# Patient Record
Sex: FEMALE | Race: BLACK OR AFRICAN AMERICAN | Employment: FULL TIME | URBAN - METROPOLITAN AREA
[De-identification: names, ages, dates, MRNs, and addresses within clinical notes are randomized per-mention and may not be internally consistent; named-entity substitution may affect disease eponyms.]

---

## 2017-01-13 RX ORDER — LANSOPRAZOLE 30 MG/1
CAPSULE, DELAYED RELEASE ORAL
Qty: 90 CAP | Refills: 3 | Status: SHIPPED | OUTPATIENT
Start: 2017-01-13 | End: 2018-06-15 | Stop reason: ALTCHOICE

## 2017-02-24 ENCOUNTER — OFFICE VISIT (OUTPATIENT)
Dept: INTERNAL MEDICINE CLINIC | Age: 52
End: 2017-02-24

## 2017-02-24 VITALS
DIASTOLIC BLOOD PRESSURE: 84 MMHG | HEART RATE: 80 BPM | BODY MASS INDEX: 30.99 KG/M2 | HEIGHT: 65 IN | SYSTOLIC BLOOD PRESSURE: 127 MMHG | TEMPERATURE: 97.6 F | WEIGHT: 186 LBS | OXYGEN SATURATION: 99 % | RESPIRATION RATE: 20 BRPM

## 2017-02-24 DIAGNOSIS — E66.9 OBESITY, UNSPECIFIED: ICD-10-CM

## 2017-02-24 DIAGNOSIS — E55.9 VITAMIN D DEFICIENCY: ICD-10-CM

## 2017-02-24 DIAGNOSIS — R73.9 ELEVATED BLOOD SUGAR: ICD-10-CM

## 2017-02-24 DIAGNOSIS — I10 ESSENTIAL HYPERTENSION: Primary | ICD-10-CM

## 2017-02-24 DIAGNOSIS — E78.00 PURE HYPERCHOLESTEROLEMIA: ICD-10-CM

## 2017-02-24 RX ORDER — ESTRADIOL 10 UG/1
TABLET VAGINAL
Refills: 5 | COMMUNITY
Start: 2017-02-09

## 2017-02-24 NOTE — PROGRESS NOTES
Patient is in the office today for a 3 month follow up. Do you have an Advance Directive yes - on file with Kevin Eric      1. Have you been to the ER, urgent care clinic since your last visit? Hospitalized since your last visit? No    2. Have you seen or consulted any other health care providers outside of the [address] [place] [address] since your last visit? Include any pap smears or colon screening. Yes, Dr. Xander Aden.

## 2017-02-24 NOTE — PATIENT INSTRUCTIONS

## 2017-02-24 NOTE — MR AVS SNAPSHOT
Visit Information Date & Time Provider Department Dept. Phone Encounter #  
 2/24/2017  4:00 PM Lianne Campuzano MD [facility] [phone] Follow-up Instructions Return in about 3 months (around 5/24/2017) for labs 1 week before. Your Appointments 5/2/2017  8:00 AM  
LAB with Lianne Campuzano MD  
[facility] (--) Appt Note: 6 mo f/u lab [address] [id number] [phone]  
  
   
 [address] [place] [address],[address] [place] [address]  
  
    
 5/9/2017  8:30 AM  
ROUTINE CARE with Lianne Campuzano MD  
[facility] (--) Appt Note: 6 mo f/u  
 [address] [id number] [phone]  
  
   
 [address] [place], [place] [address] Upcoming Health Maintenance Date Due Hepatitis C Screening 1965 PAP AKA CERVICAL CYTOLOGY 12/16/2014 BREAST CANCER SCRN MAMMOGRAM 2/27/2016 INFLUENZA AGE 9 TO ADULT 8/1/2016 DTaP/Tdap/Td series (2 - Td) 6/16/2017 COLONOSCOPY 5/13/2020 Allergies as of 2/24/2017  Review Complete On: 2/24/2017 By: Shelby Cai LPN Severity Noted Reaction Type Reactions Bactrim [Sulfamethoprim Ds]  12/15/2011    Other (comments) Made mouth feel thick, speech slurred Current Immunizations  Reviewed on 11/9/2015 Name Date Influenza Vaccine 10/15/2015 Influenza Vaccine Nasal 8/1/2012 Influenza Vaccine Whole 8/16/2011 TDAP Vaccine 6/16/2007 Not reviewed this visit You Were Diagnosed With   
  
 Codes Comments Essential hypertension    -  Primary ICD-10-CM: I10 
ICD-9-CM: 401.9 Vitamin D deficiency     ICD-10-CM: E55.9 ICD-9-CM: 268.9 Obesity, unspecified     ICD-10-CM: E66.9 ICD-9-CM: 278.00 Vitals BP  
  
  
  
  
  
 127/84 (BP 1 Location: Left arm, BP Patient Position: Sitting) Vitals History BMI and BSA Data Body Mass Index Body Surface Area 30.95 kg/m 2 1.97 m 2 Preferred Pharmacy Pharmacy Name Phone [id number] [address], [address] [facility] [phone] Your Updated Medication List  
  
   
This list is accurate as of: 2/24/17  4:51 PM.  Always use your most recent med list.  
  
  
  
  
 Cholecalciferol (Vitamin D3) 2,000 unit Cap capsule Commonly known as:  VITAMIN D Take 2,000 Units by mouth daily. CLINDESSE 2 % SR vaginal cream  
Generic drug:  clindamycin  
  
 cloNIDine 0.1 mg/24 hr patch Commonly known as:  CATAPRES  
APPLY 1 PATCH BY TRANSDERMAL ROUTE EVERY 7 DAYS. cpap machine kit  
by Does Not Apply route. Replace mask, headgear, heated hose, filters and accessories as needed for one year. CPAP pressure: 9 cwp Mask: standard Mirage FX plus Opus nasal pillows mask, or mask of choice Homecare Company: MED Download data 30 days after initiation of therapy  
  
 cyclobenzaprine 5 mg tablet Commonly known as:  FLEXERIL  
TAKE 1-2 TABLETS BY MOUTH AT NIGHT. diphenhydrAMINE 25 mg capsule Commonly known as:  BENADRYL Take 1 capsule by mouth nightly as needed. lansoprazole 30 mg capsule Commonly known as:  PREVACID TAKE 1 CAPSULE BY MOUTH DAILY (BEFORE BREAKFAST). lisinopril-hydroCHLOROthiazide 20-12.5 mg per tablet Commonly known as:  PRINZIDE, ZESTORETIC  
TAKE 1 TABLET BY MOUTH ONCE DAILY lorcaserin 10 mg Tab Commonly known as:  Delories [place] Take 10 mg by mouth two (2) times a day. Max Daily Amount: 20 mg.  
  
 meclizine 25 mg tablet Commonly known as:  ANTIVERT  
TAKE 1 TAB BY MOUTH THREE (3) TIMES DAILY AS NEEDED FOR UP TO 10 DAYS. MILK THISTLE PO Take 1 Cap by mouth daily. ondansetron hcl 4 mg tablet Commonly known as:  ZOFRAN (AS HYDROCHLORIDE) Take 1 Tab by mouth every eight (8) hours as needed for Nausea. * OTHER Tumeric 1 cap daily  * OTHER  
alestrian hormone cream  
  
 PROBIOTIC PO Take  by mouth. raNITIdine 300 mg tablet Commonly known as:  ZANTAC Take 300 mg by mouth daily as needed. simvastatin 20 mg tablet Commonly known as:  ZOCOR  
TAKE 1 TABLET BY MOUTH AT BEDTIME  
  
 valACYclovir 500 mg tablet Commonly known as:  VALTREX Take 500 mg by mouth daily. vitamin e 400 unit Tab Take 1 Cap by mouth daily. YUVAFEM 10 mcg Tab vaginal tablet Generic drug:  estradiol INSERT 1 APPLICATORFUL VAGINALLY AT BEDTIME FOR 14 DAYS THEN TWICE PER WEEK * Notice: This list has 2 medication(s) that are the same as other medications prescribed for you. Read the directions carefully, and ask your doctor or other care provider to review them with you. Follow-up Instructions Return in about 3 months (around 5/24/2017) for labs 1 week before. Patient Instructions Heart-Healthy Diet: Care Instructions Your Care Instructions A heart-healthy diet has lots of vegetables, fruits, nuts, beans, and whole grains, and is low in salt. It limits foods that are high in saturated fat, such as meats, cheeses, and fried foods. It may be hard to change your diet, but even small changes can lower your risk of heart attack and heart disease. Follow-up care is a key part of your treatment and safety. Be sure to make and go to all appointments, and call your doctor if you are having problems. It's also a good idea to know your test results and keep a list of the medicines you take. How can you care for yourself at home? Watch your portions · Learn what a serving is. A \"serving\" and a \"portion\" are not always the same thing. Make sure that you are not eating larger portions than are recommended. For example, a serving of pasta is ½ cup. A serving size of meat is 2 to 3 ounces. A 3-ounce serving is about the size of a deck of cards. Measure serving sizes until you are good at [place]" them.  Keep in mind that restaurants often serve portions that are 2 or 3 times the size of one serving. · To keep your energy level up and keep you from feeling hungry, eat often but in smaller portions. · Eat only the number of calories you need to stay at a healthy weight. If you need to lose weight, eat fewer calories than your body burns (through exercise and other physical activity). Eat more fruits and vegetables · Eat a variety of fruit and vegetables every day. Dark green, deep orange, red, or yellow fruits and vegetables are especially good for you. Examples include spinach, carrots, peaches, and berries. · Keep carrots, celery, and other veggies handy for snacks. Buy fruit that is in season and store it where you can see it so that you will be tempted to eat it. · Cook dishes that have a lot of veggies in them, such as stir-fries and soups. Limit saturated and trans fat · Read food labels, and try to avoid saturated and trans fats. They increase your risk of heart disease. Trans fat is found in many processed foods such as cookies and crackers. · Use olive or canola oil when you cook. Try cholesterol-lowering spreads, such as Benecol or Take Control. · Bake, broil, grill, or steam foods instead of frying them. · Choose lean meats instead of high-fat meats such as hot dogs and sausages. Cut off all visible fat when you prepare meat. · Eat fish, skinless poultry, and meat alternatives such as soy products instead of high-fat meats. Soy products, such as tofu, may be especially good for your heart. · Choose low-fat or fat-free milk and dairy products. Eat fish · Eat at least two servings of fish a week. Certain fish, such as salmon and tuna, contain omega-3 fatty acids, which may help reduce your risk of heart attack. Eat foods high in fiber · Eat a variety of grain products every day. Include whole-grain foods that have lots of fiber and nutrients.  Examples of whole-grain foods include oats, whole wheat bread, and brown rice. · Buy whole-grain breads and cereals, instead of white bread or pastries. Limit salt and sodium · Limit how much salt and sodium you eat to help lower your blood pressure. · Taste food before you salt it. Add only a little salt when you think you need it. With time, your taste buds will adjust to less salt. · Eat fewer snack items, fast foods, and other high-salt, processed foods. Check food labels for the amount of sodium in packaged foods. · Choose low-sodium versions of canned goods (such as soups, vegetables, and beans). Limit sugar · Limit drinks and foods with added sugar. These include candy, desserts, and soda pop. Limit alcohol · Limit alcohol to no more than 2 drinks a day for men and 1 drink a day for women. Too much alcohol can cause health problems. When should you call for help? Watch closely for changes in your health, and be sure to contact your doctor if: 
· You would like help planning heart-healthy meals. Where can you learn more? Go to http://libby-orlando.info/. Enter V137 in the search box to learn more about \"Heart-Healthy Diet: Care Instructions. \" Current as of: January 27, 2016 Content Version: 11.1 © 4303-8816 [employer]. Care instructions adapted under license by [employer] (which disclaims liability or warranty for this information). If you have questions about a medical condition or this instruction, always ask your healthcare professional. [place] [address] any warranty or liability for your use of this information. Introducing hospitals & HEALTH SERVICES! [employer] introduces [employer] patient portal. Now you can access parts of your medical record, email your doctor's office, and request medication refills online. 1. In your internet browser, go to [email or web address] 2. Click on the First Time User? Click Here link in the Sign In box.  You will see the New Member Sign Up page. 3. Enter your [employer] Access Code exactly as it appears below. You will not need to use this code after youve completed the sign-up process. If you do not sign up before the expiration date, you must request a new code. · [employer] Access Code: [email or web address] Expires: 5/25/2017  4:51 PM 
 
4. Enter the last four digits of your Social Security Number (xxxx) and Date of Birth (mm/dd/yyyy) as indicated and click Submit. You will be taken to the next sign-up page. 5. Create a [employer] ID. This will be your [employer] login ID and cannot be changed, so think of one that is secure and easy to remember. 6. Create a [employer] password. You can change your password at any time. 7. Enter your Password Reset Question and Answer. This can be used at a later time if you forget your password. 8. Enter your e-mail address. You will receive e-mail notification when new information is available in [address]. 9. Click Sign Up. You can now view and download portions of your medical record. 10. Click the Download Summary menu link to download a portable copy of your medical information. If you have questions, please visit the Frequently Asked Questions section of the [employer] website. Remember, [employer] is NOT to be used for urgent needs. For medical emergencies, dial 911. Now available from your iPhone and Android! Please provide this summary of care documentation to your next provider. Your primary care clinician is listed as ZAKIYA BEGUM. If you have any questions after today's visit, please call [phone].

## 2017-02-26 NOTE — PROGRESS NOTES
Subjective:       Chief Complaint  The patient presents for follow up of hypertension and high cholesterol. Vit D deficiency         HPI  Howie Lira is a 45 y.o. female seen for follow up of hyperlipidemia. Shealso has hypertension. Hypertension well controlled, no significant medication side effects noted, on catapres which is also for hot flashes and zestoretic , hyperlipidemia improved on Zocor with better compliance but would like to get LDL<70, no significant medication side effects noted, on Zocor. Pt continues to struggle with with losing weight but is doing better with current diet. Pt did not use Belviq and did not want to do high protein diet. Diet and Lifestyle: generally follows a low fat low cholesterol diet, exercises sporadically    Home BP Monitoring: is not measured at home. Other symptoms and concerns: vit D level is well controlled on vit D 2000 units/day     Prediabetes  Fairly stable with diet     Current Outpatient Prescriptions   Medication Sig    YUVAFEM 10 mcg tab vaginal tablet INSERT 1 APPLICATORFUL VAGINALLY AT BEDTIME FOR 14 DAYS THEN TWICE PER WEEK    lansoprazole (PREVACID) 30 mg capsule TAKE 1 CAPSULE BY MOUTH DAILY (BEFORE BREAKFAST).  LACTOBACILLUS ACIDOPHILUS (PROBIOTIC PO) Take  by mouth.  simvastatin (ZOCOR) 20 mg tablet TAKE 1 TABLET BY MOUTH AT BEDTIME    lisinopril-hydrochlorothiazide (PRINZIDE, ZESTORETIC) 20-12.5 mg per tablet TAKE 1 TABLET BY MOUTH ONCE DAILY    OTHER alestrian hormone cream    cloNIDine (CATAPRES) 0.1 mg/24 hr patch APPLY 1 PATCH BY TRANSDERMAL ROUTE EVERY 7 DAYS.  OTHER Tumeric 1 cap daily    ranitidine (ZANTAC) 300 mg tablet Take 300 mg by mouth daily as needed.  MILK THISTLE PO Take 1 Cap by mouth daily.  vitamin e 400 unit tab Take 1 Cap by mouth daily.  Cholecalciferol, Vitamin D3, (VITAMIN D) 2,000 unit cap capsule Take 2,000 Units by mouth daily.  cpap machine kit by Does Not Apply route.  Replace mask, headgear, heated hose, filters and accessories as needed for one year. CPAP pressure: 9 cwp  Mask: standard Mirage FX plus Opus nasal pillows mask, or mask of choice  Homecare Company: MED  Download data 30 days after initiation of therapy    cyclobenzaprine (FLEXERIL) 5 mg tablet TAKE 1-2 TABLETS BY MOUTH AT NIGHT.  meclizine (ANTIVERT) 25 mg tablet TAKE 1 TAB BY MOUTH THREE (3) TIMES DAILY AS NEEDED FOR UP TO 10 DAYS.  lorcaserin (BELVIQ) 10 mg tab Take 10 mg by mouth two (2) times a day. Max Daily Amount: 20 mg.    ondansetron hcl (ZOFRAN, AS HYDROCHLORIDE,) 4 mg tablet Take 1 Tab by mouth every eight (8) hours as needed for Nausea.  valACYclovir (VALTREX) 500 mg tablet Take 500 mg by mouth daily.  CLINDESSE 2 % SR vaginal cream     diphenhydrAMINE (BENADRYL) 25 mg capsule Take 1 capsule by mouth nightly as needed. No current facility-administered medications for this visit. Review of Systems  Respiratory: negative for dyspnea on exertion  Cardiovascular: negative for chest pain    Objective:     Visit Vitals    /84 (BP 1 Location: Left arm, BP Patient Position: Sitting)    Pulse 80    Temp 97.6 °F (36.4 °C) (Oral)    Resp 20    Ht 5' 5\" (1.651 m)    Wt 186 lb (84.4 kg)    SpO2 99%    BMI 30.95 kg/m2        General appearance - alert, well appearing, and in no distress  Chest - clear to auscultation, no wheezes, rales or rhonchi, symmetric air entry  Heart - normal rate, regular rhythm, normal S1, S2, no murmurs, rubs, clicks or gallops  Extremities - peripheral pulses normal, no pedal edema, no clubbing or cyanosis  Skin - normal coloration and turgor, no rashes, no suspicious skin lesions noted      Labs: reviewed         Assessment / Plan     Hypertension well controlled, on current meds  Hyperlipidemia fairly well controlled, on Zocor. Will try to improve it further with diet and weight loss      ICD-10-CM ICD-9-CM    1.  Essential hypertension I10 401.9 METABOLIC PANEL, COMPREHENSIVE   2. Vitamin D deficiency E55.9 268.9 Well controlled on current supplementation VITAMIN D, 25 HYDROXY   3. Pure hypercholesterolemia E78.00 272.0 Improving with diet and weight loss LIPID PANEL   4. Elevated blood sugar R73.9 790.29 Improved with weight loss  And now in normal range. HEMOGLOBIN A1C W/O EAG   5. Obesity, unspecified E66.9 278.00 Pt will continue to work on losing weight with diet and exercise. Low cholesterol diet, weight control and daily exercise discussed. Follow-up Disposition:  Return in about 3 months (around 5/24/2017) for labs 1 week before. Reviewed plan of care. Patient has provided input and agrees with goals.

## 2017-04-10 RX ORDER — CLONIDINE 0.1 MG/24H
PATCH, EXTENDED RELEASE TRANSDERMAL
Qty: 12 PATCH | Refills: 3 | Status: SHIPPED | OUTPATIENT
Start: 2017-04-10 | End: 2017-09-07 | Stop reason: SDUPTHER

## 2017-04-17 RX ORDER — SIMVASTATIN 20 MG/1
TABLET, FILM COATED ORAL
Qty: 90 TAB | Refills: 2 | Status: SHIPPED | OUTPATIENT
Start: 2017-04-17 | End: 2017-09-07 | Stop reason: SDUPTHER

## 2017-05-16 ENCOUNTER — OFFICE VISIT (OUTPATIENT)
Dept: INTERNAL MEDICINE CLINIC | Age: 52
End: 2017-05-16

## 2017-05-16 VITALS
RESPIRATION RATE: 18 BRPM | WEIGHT: 188 LBS | HEART RATE: 87 BPM | DIASTOLIC BLOOD PRESSURE: 85 MMHG | TEMPERATURE: 98.2 F | SYSTOLIC BLOOD PRESSURE: 138 MMHG | BODY MASS INDEX: 31.32 KG/M2 | HEIGHT: 65 IN | OXYGEN SATURATION: 98 %

## 2017-05-16 DIAGNOSIS — I10 ESSENTIAL HYPERTENSION: Primary | ICD-10-CM

## 2017-05-16 DIAGNOSIS — R73.9 ELEVATED BLOOD SUGAR: ICD-10-CM

## 2017-05-16 DIAGNOSIS — E78.00 PURE HYPERCHOLESTEROLEMIA: ICD-10-CM

## 2017-05-16 DIAGNOSIS — E55.9 VITAMIN D DEFICIENCY: ICD-10-CM

## 2017-05-16 RX ORDER — LISINOPRIL AND HYDROCHLOROTHIAZIDE 12.5; 2 MG/1; MG/1
TABLET ORAL
Qty: 90 TAB | Refills: 2 | Status: SHIPPED | OUTPATIENT
Start: 2017-05-16 | End: 2017-09-07 | Stop reason: SDUPTHER

## 2017-05-16 RX ORDER — LEVALBUTEROL TARTRATE 45 UG/1
AEROSOL, METERED ORAL
Refills: 0 | COMMUNITY
Start: 2017-05-04 | End: 2018-03-01 | Stop reason: SDUPTHER

## 2017-05-16 NOTE — PROGRESS NOTES
Patient is in the office today for a 6 month follow up. 1. Have you been to the ER, urgent care clinic since your last visit? Hospitalized since your last visit? Yes, [facility]. 2. Have you seen or consulted any other health care providers outside of the [facility] since your last visit? Include any pap smears or colon screening. Yes, Dr. Orly Hunt.

## 2017-05-16 NOTE — PROGRESS NOTES
Subjective:       Chief Complaint  The patient presents for follow up of hypertension and high cholesterol. Vit D deficiency         HPI  Montse Grimes is a 45 y.o. female seen for follow up of hyperlipidemia. Shealso has hypertension. Hypertension well controlled, no significant medication side effects noted, on catapres which is also for hot flashes and zestoretic , hyperlipidemia improved on Zocor with better compliance but would like to get LDL<70, it is currently 94, no significant medication side effects noted, on Zocor. Pt continues to struggle with with losing weight but is doing better with current diet. Diet and Lifestyle: generally follows a low fat low cholesterol diet, exercises sporadically    Home BP Monitoring: is not measured at home. Other symptoms and concerns: vit D level is well controlled on vit D 2000 units/day     Prediabetes  Fairly stable with diet     Current Outpatient Prescriptions   Medication Sig    levalbuterol tartrate (XOPENEX) 45 mcg/actuation inhaler INHALE 1-2 PUFFS EVERY 4 TO 6 HOURS AS NEEDED    lisinopril-hydroCHLOROthiazide (PRINZIDE, ZESTORETIC) 20-12.5 mg per tablet TAKE 1 TABLET BY MOUTH ONCE DAILY    simvastatin (ZOCOR) 20 mg tablet TAKE 1 TABLET BY MOUTH AT BEDTIME    cloNIDine (CATAPRES) 0.1 mg/24 hr patch APPLY 1 PATCH BY TRANSDERMAL ROUTE EVERY 7 DAYS.    YUVAFEM 10 mcg tab vaginal tablet INSERT 1 APPLICATORFUL VAGINALLY AT BEDTIME FOR 14 DAYS THEN TWICE PER WEEK    lansoprazole (PREVACID) 30 mg capsule TAKE 1 CAPSULE BY MOUTH DAILY (BEFORE BREAKFAST).  LACTOBACILLUS ACIDOPHILUS (PROBIOTIC PO) Take  by mouth.  OTHER alestrian hormone cream    OTHER Tumeric 1 cap daily    valACYclovir (VALTREX) 500 mg tablet Take 500 mg by mouth daily.  ranitidine (ZANTAC) 300 mg tablet Take 300 mg by mouth daily as needed.  CLINDESSE 2 % SR vaginal cream     MILK THISTLE PO Take 1 Cap by mouth daily.  vitamin e 400 unit tab Take 1 Cap by mouth daily.  Cholecalciferol, Vitamin D3, (VITAMIN D) 2,000 unit cap capsule Take 2,000 Units by mouth daily.  cpap machine kit by Does Not Apply route. Replace mask, headgear, heated hose, filters and accessories as needed for one year. CPAP pressure: 9 cwp  Mask: standard Mirage FX plus Opus nasal pillows mask, or mask of choice  Homecare Company: MED  Download data 30 days after initiation of therapy    cyclobenzaprine (FLEXERIL) 5 mg tablet TAKE 1-2 TABLETS BY MOUTH AT NIGHT.  meclizine (ANTIVERT) 25 mg tablet TAKE 1 TAB BY MOUTH THREE (3) TIMES DAILY AS NEEDED FOR UP TO 10 DAYS.  lorcaserin (BELVIQ) 10 mg tab Take 10 mg by mouth two (2) times a day. Max Daily Amount: 20 mg.    ondansetron hcl (ZOFRAN, AS HYDROCHLORIDE,) 4 mg tablet Take 1 Tab by mouth every eight (8) hours as needed for Nausea.  diphenhydrAMINE (BENADRYL) 25 mg capsule Take 1 capsule by mouth nightly as needed. No current facility-administered medications for this visit. Review of Systems  Respiratory: negative for dyspnea on exertion  Cardiovascular: negative for chest pain    Objective:     Visit Vitals    /85 (BP 1 Location: Left arm, BP Patient Position: Sitting)    Pulse 87    Temp 98.2 °F (36.8 °C) (Oral)    Resp 18    Ht 5' 5\" (1.651 m)    Wt 188 lb (85.3 kg)    SpO2 98%    BMI 31.28 kg/m2        General appearance - alert, well appearing, and in no distress  Chest - clear to auscultation, no wheezes, rales or rhonchi, symmetric air entry  Heart - normal rate, regular rhythm, normal S1, S2, no murmurs, rubs, clicks or gallops  Extremities - peripheral pulses normal, no pedal edema, no clubbing or cyanosis  Skin - normal coloration and turgor, no rashes, no suspicious skin lesions noted      Labs: reviewed         Assessment / Plan     Hypertension well controlled, on current meds  Hyperlipidemia fairly well controlled, on Zocor.  Will try to improve it further with diet and weight loss      ICD-10-CM ICD-9-CM    1. Essential hypertension W50 506.8 METABOLIC PANEL, COMPREHENSIVE   2. Pure hypercholesterolemia E78.00 272.0 LIPID PANEL   3. Vitamin D deficiency E55.9 268.9 Well controlled on vit D 2000 units/day VITAMIN D, 25 HYDROXY   4. Elevated blood sugar R73.9 790.29 Much improved with diet HEMOGLOBIN A1C W/O EAG                 Low cholesterol diet, weight control and daily exercise discussed. Follow-up Disposition:  Return in about 3 months (around 8/16/2017) for labs 1 week before. Reviewed plan of care. Patient has provided input and agrees with goals.

## 2017-09-07 ENCOUNTER — OFFICE VISIT (OUTPATIENT)
Dept: INTERNAL MEDICINE CLINIC | Age: 52
End: 2017-09-07

## 2017-09-07 VITALS
OXYGEN SATURATION: 97 % | BODY MASS INDEX: 31.99 KG/M2 | WEIGHT: 192 LBS | TEMPERATURE: 98.3 F | SYSTOLIC BLOOD PRESSURE: 124 MMHG | DIASTOLIC BLOOD PRESSURE: 75 MMHG | HEART RATE: 73 BPM | RESPIRATION RATE: 18 BRPM | HEIGHT: 65 IN

## 2017-09-07 DIAGNOSIS — E78.00 PURE HYPERCHOLESTEROLEMIA: ICD-10-CM

## 2017-09-07 DIAGNOSIS — E55.9 VITAMIN D DEFICIENCY: ICD-10-CM

## 2017-09-07 DIAGNOSIS — Z11.59 NEED FOR HEPATITIS C SCREENING TEST: ICD-10-CM

## 2017-09-07 DIAGNOSIS — I10 ESSENTIAL HYPERTENSION: Primary | ICD-10-CM

## 2017-09-07 RX ORDER — SIMVASTATIN 20 MG/1
TABLET, FILM COATED ORAL
Qty: 90 TAB | Refills: 2 | Status: SHIPPED | OUTPATIENT
Start: 2017-09-07 | End: 2018-09-19 | Stop reason: SDUPTHER

## 2017-09-07 RX ORDER — LISINOPRIL AND HYDROCHLOROTHIAZIDE 12.5; 2 MG/1; MG/1
TABLET ORAL
Qty: 90 TAB | Refills: 2 | Status: SHIPPED | OUTPATIENT
Start: 2017-09-07 | End: 2018-05-14 | Stop reason: SDUPTHER

## 2017-09-07 RX ORDER — CYCLOBENZAPRINE HCL 5 MG
TABLET ORAL
Qty: 90 TAB | Refills: 1 | Status: SHIPPED | OUTPATIENT
Start: 2017-09-07

## 2017-09-07 RX ORDER — CLONIDINE 0.1 MG/24H
PATCH, EXTENDED RELEASE TRANSDERMAL
Qty: 12 PATCH | Refills: 3 | Status: SHIPPED | OUTPATIENT
Start: 2017-09-07 | End: 2018-09-09 | Stop reason: SDUPTHER

## 2017-09-07 NOTE — PROGRESS NOTES
Subjective:       Chief Complaint  The patient presents for follow up of hypertension and high cholesterol. Vit D deficiency         HPI  Tyshawn Trinidad is a 45 y.o. female seen for follow up of hyperlipidemia. Shealso has hypertension. Hypertension well controlled, no significant medication side effects noted, on catapres which is also for hot flashes and zestoretic , hyperlipidemia improved on Zocor with better compliance. LDL is down to 79, no significant medication side effects noted, on Zocor. Pt continues to struggle with with losing weight but is doing better with current diet. Diet and Lifestyle: generally follows a low fat low cholesterol diet, exercises sporadically    Home BP Monitoring: is not measured at home. Other symptoms and concerns: vit D level is well controlled on vit D 2000 units/day     Prediabetes  Fairly stable with diet     Current Outpatient Prescriptions   Medication Sig    simvastatin (ZOCOR) 20 mg tablet TAKE 1 TABLET BY MOUTH AT BEDTIME    lisinopril-hydroCHLOROthiazide (PRINZIDE, ZESTORETIC) 20-12.5 mg per tablet TAKE 1 TABLET BY MOUTH ONCE DAILY    cloNIDine (CATAPRES) 0.1 mg/24 hr patch APPLY 1 PATCH BY TRANSDERMAL ROUTE EVERY 7 DAYS.  cyclobenzaprine (FLEXERIL) 5 mg tablet TAKE 1-2 TABLETS BY MOUTH AT NIGHT.  levalbuterol tartrate (XOPENEX) 45 mcg/actuation inhaler INHALE 1-2 PUFFS EVERY 4 TO 6 HOURS AS NEEDED    YUVAFEM 10 mcg tab vaginal tablet INSERT 1 APPLICATORFUL VAGINALLY AT BEDTIME FOR 14 DAYS THEN TWICE PER WEEK    lansoprazole (PREVACID) 30 mg capsule TAKE 1 CAPSULE BY MOUTH DAILY (BEFORE BREAKFAST).  LACTOBACILLUS ACIDOPHILUS (PROBIOTIC PO) Take  by mouth.  OTHER alestrian hormone cream    OTHER Tumeric 1 cap daily    valACYclovir (VALTREX) 500 mg tablet Take 500 mg by mouth daily.  ranitidine (ZANTAC) 300 mg tablet Take 300 mg by mouth daily as needed.  CLINDESSE 2 % SR vaginal cream     MILK THISTLE PO Take 1 Cap by mouth daily.     vitamin e 400 unit tab Take 1 Cap by mouth daily.  Cholecalciferol, Vitamin D3, (VITAMIN D) 2,000 unit cap capsule Take 2,000 Units by mouth daily.  cpap machine kit by Does Not Apply route. Replace mask, headgear, heated hose, filters and accessories as needed for one year. CPAP pressure: 9 cwp  Mask: standard Mirage FX plus Opus nasal pillows mask, or mask of choice  [employer] Company: MED  Download data 30 days after initiation of therapy    meclizine (ANTIVERT) 25 mg tablet TAKE 1 TAB BY MOUTH THREE (3) TIMES DAILY AS NEEDED FOR UP TO 10 DAYS.  diphenhydrAMINE (BENADRYL) 25 mg capsule Take 1 capsule by mouth nightly as needed. No current facility-administered medications for this visit. Review of Systems  Respiratory: negative for dyspnea on exertion  Cardiovascular: negative for chest pain    Objective:     Visit Vitals    /75 (BP 1 Location: Left arm, BP Patient Position: Sitting)    Pulse 73    Temp 98.3 °F (36.8 °C) (Oral)    Resp 18    Ht 5' 5\" (1.651 m)    Wt 192 lb (87.1 kg)    SpO2 97%    BMI 31.95 kg/m2        General appearance - alert, well appearing, and in no distress  Chest - clear to auscultation, no wheezes, rales or rhonchi, symmetric air entry  Heart - normal rate, regular rhythm, normal S1, S2, no murmurs, rubs, clicks or gallops  Extremities - peripheral pulses normal, no pedal edema, no clubbing or cyanosis  Skin - normal coloration and turgor, no rashes, no suspicious skin lesions noted      Labs: reviewed         Assessment / Plan     Hypertension well controlled, on current meds  Hyperlipidemia fairly well controlled, on Zocor. Will try to improve it further with diet and weight loss      ICD-10-CM ICD-9-CM    1. Essential hypertension M31 730.5 METABOLIC PANEL, COMPREHENSIVE   2.  Pure hypercholesterolemia E78.00 272.0 LIPID PANEL   3. Vitamin D deficiency E55.9 268.9 Well controlled on vit D 2000 units/day VITAMIN D, 25 HYDROXY Low cholesterol diet, weight control and daily exercise discussed. Follow-up Disposition:  Return in about 6 months (around 3/7/2018) for labs 1 week before. Reviewed plan of care. Patient has provided input and agrees with goals.

## 2017-09-07 NOTE — PATIENT INSTRUCTIONS

## 2017-09-07 NOTE — PROGRESS NOTES
Patient is in the office today for a follow up. 1. Have you been to the ER, urgent care clinic since your last visit? Hospitalized since your last visit? No    2. Have you seen or consulted any other health care providers outside of the [address] [place] [address] since your last visit? Include any pap smears or colon screening.  No

## 2017-09-07 NOTE — MR AVS SNAPSHOT
Visit Information Date & Time Provider Department Dept. Phone Encounter #  
 9/7/2017 11:30 AM Kenneth Humphrey MD [facility] [employer] [phone] Follow-up Instructions Return in about 6 months (around 3/7/2018) for labs 1 week before. Upcoming Health Maintenance Date Due Hepatitis C Screening 1965 PAP AKA CERVICAL CYTOLOGY 12/16/2014 BREAST CANCER SCRN MAMMOGRAM 2/27/2016 DTaP/Tdap/Td series (2 - Td) 6/16/2017 INFLUENZA AGE 9 TO ADULT 8/1/2017 COLONOSCOPY 5/13/2020 Allergies as of 9/7/2017  Review Complete On: 9/7/2017 By: Kenneth Humphrey MD  
  
 Severity Noted Reaction Type Reactions Bactrim [Sulfamethoprim Ds]  12/15/2011    Other (comments) Made mouth feel thick, speech slurred Current Immunizations  Reviewed on 11/9/2015 Name Date Influenza Vaccine 10/15/2015 Influenza Vaccine Nasal 8/1/2012 Influenza Vaccine Whole 8/16/2011 TDAP Vaccine 6/16/2007 Not reviewed this visit You Were Diagnosed With   
  
 Codes Comments Essential hypertension    -  Primary ICD-10-CM: I10 
ICD-9-CM: 401.9 Pure hypercholesterolemia     ICD-10-CM: E78.00 ICD-9-CM: 272.0 Vitamin D deficiency     ICD-10-CM: E55.9 ICD-9-CM: 268.9 Vitals BP Pulse Temp Resp Height(growth percentile) Weight(growth percentile) 124/75 (BP 1 Location: Left arm, BP Patient Position: Sitting) 73 98.3 °F (36.8 °C) (Oral) 18 5' 5\" (1.651 m) 192 lb (87.1 kg) SpO2 BMI OB Status Smoking Status 97% 31.95 kg/m2 Hysterectomy Never Smoker Vitals History BMI and BSA Data Body Mass Index Body Surface Area 31.95 kg/m 2 2 m 2 Preferred Pharmacy Pharmacy Name Phone [id number], [address] [facility] [facility] [phone] Your Updated Medication List  
  
   
This list is accurate as of: 9/7/17 12:11 PM.  Always use your most recent med list.  
  
  
  
  
 Cholecalciferol (Vitamin D3) 2,000 unit Cap capsule Commonly known as:  VITAMIN D Take 2,000 Units by mouth daily. CLINDESSE 2 % SR vaginal cream  
Generic drug:  clindamycin  
  
 cloNIDine 0.1 mg/24 hr patch Commonly known as:  CATAPRES  
APPLY 1 PATCH BY TRANSDERMAL ROUTE EVERY 7 DAYS. cpap machine kit  
by Does Not Apply route. Replace mask, headgear, heated hose, filters and accessories as needed for one year. CPAP pressure: 9 cwp Mask: standard Mirage FX plus Opus nasal pillows mask, or mask of choice Homecare Company: MED Download data 30 days after initiation of therapy  
  
 cyclobenzaprine 5 mg tablet Commonly known as:  FLEXERIL  
TAKE 1-2 TABLETS BY MOUTH AT NIGHT. diphenhydrAMINE 25 mg capsule Commonly known as:  BENADRYL Take 1 capsule by mouth nightly as needed. lansoprazole 30 mg capsule Commonly known as:  PREVACID TAKE 1 CAPSULE BY MOUTH DAILY (BEFORE BREAKFAST). levalbuterol tartrate 45 mcg/actuation inhaler Commonly known as:  XOPENEX  
INHALE 1-2 PUFFS EVERY 4 TO 6 HOURS AS NEEDED  
  
 lisinopril-hydroCHLOROthiazide 20-12.5 mg per tablet Commonly known as:  PRINZIDE, ZESTORETIC  
TAKE 1 TABLET BY MOUTH ONCE DAILY  
  
 meclizine 25 mg tablet Commonly known as:  ANTIVERT  
TAKE 1 TAB BY MOUTH THREE (3) TIMES DAILY AS NEEDED FOR UP TO 10 DAYS. MILK THISTLE PO Take 1 Cap by mouth daily. * OTHER Tumeric 1 cap daily * OTHER  
alestrian hormone cream  
  
 PROBIOTIC PO Take  by mouth. raNITIdine 300 mg tablet Commonly known as:  ZANTAC Take 300 mg by mouth daily as needed. simvastatin 20 mg tablet Commonly known as:  ZOCOR  
TAKE 1 TABLET BY MOUTH AT BEDTIME  
  
 valACYclovir 500 mg tablet Commonly known as:  VALTREX Take 500 mg by mouth daily. vitamin e 400 unit Tab Take 1 Cap by mouth daily. YUVAFEM 10 mcg Tab vaginal tablet Generic drug:  estradiol INSERT 1 APPLICATORFUL VAGINALLY AT BEDTIME FOR 14 DAYS THEN TWICE PER WEEK * Notice: This list has 2 medication(s) that are the same as other medications prescribed for you. Read the directions carefully, and ask your doctor or other care provider to review them with you. Prescriptions Sent to Pharmacy Refills  
 simvastatin (ZOCOR) 20 mg tablet 2 Sig: TAKE 1 TABLET BY MOUTH AT BEDTIME Class: Normal  
 Pharmacy: [id number] [address] Ph #: [phone]  
 lisinopril-hydroCHLOROthiazide (PRINZIDE, ZESTORETIC) 20-12.5 mg per tablet 2 Sig: TAKE 1 TABLET BY MOUTH ONCE DAILY Class: Normal  
 Pharmacy: [facility] #[id number] [address] Ph #: [phone]  
 cloNIDine (CATAPRES) 0.1 mg/24 hr patch 3 Sig: APPLY 1 PATCH BY TRANSDERMAL ROUTE EVERY 7 DAYS. Class: Normal  
 Pharmacy: [facility] #[id number] Ph #: [phone]  
 cyclobenzaprine (FLEXERIL) 5 mg tablet 1 Sig: TAKE 1-2 TABLETS BY MOUTH AT NIGHT. Class: Normal  
 Pharmacy: [facility]/pharmacy [id number] [facility] [facility] [facility] [place] [address] Ph #: [phone] Follow-up Instructions Return in about 6 months (around 3/7/2018) for labs 1 week before. Patient Instructions Heart-Healthy Diet: Care Instructions Your Care Instructions A heart-healthy diet has lots of vegetables, fruits, nuts, beans, and whole grains, and is low in salt. It limits foods that are high in saturated fat, such as meats, cheeses, and fried foods. It may be hard to change your diet, but even small changes can lower your risk of heart attack and heart disease. Follow-up care is a key part of your treatment and safety. Be sure to make and go to all appointments, and call your doctor if you are having problems. It's also a good idea to know your test results and keep a list of the medicines you take. How can you care for yourself at home? Watch your portions · Learn what a serving is. A \"serving\" and a \"portion\" are not always the same thing. Make sure that you are not eating larger portions than are recommended. For example, a serving of pasta is ½ cup. A serving size of meat is 2 to 3 ounces. A 3-ounce serving is about the size of a deck of cards. Measure serving sizes until you are good at [place]" them. Keep in mind that restaurants often serve portions that are 2 or 3 times the size of one serving. · To keep your energy level up and keep you from feeling hungry, eat often but in smaller portions. · Eat only the number of calories you need to stay at a healthy weight. If you need to lose weight, eat fewer calories than your body burns (through exercise and other physical activity). Eat more fruits and vegetables · Eat a variety of fruit and vegetables every day. Dark green, deep orange, red, or yellow fruits and vegetables are especially good for you. Examples include spinach, carrots, peaches, and berries. · Keep carrots, celery, and other veggies handy for snacks. Buy fruit that is in season and store it where you can see it so that you will be tempted to eat it. · Cook dishes that have a lot of veggies in them, such as stir-fries and soups. Limit saturated and trans fat · Read food labels, and try to avoid saturated and trans fats. They increase your risk of heart disease. Trans fat is found in many processed foods such as cookies and crackers. · Use olive or canola oil when you cook. Try cholesterol-lowering spreads, such as Benecol or Take Control. · Bake, broil, grill, or steam foods instead of frying them. · Choose lean meats instead of high-fat meats such as hot dogs and sausages. Cut off all visible fat when you prepare meat. · Eat fish, skinless poultry, and meat alternatives such as soy products instead of high-fat meats. Soy products, such as tofu, may be especially good for your heart. · Choose low-fat or fat-free milk and dairy products. Eat fish · Eat at least two servings of fish a week. Certain fish, such as salmon and tuna, contain omega-3 fatty acids, which may help reduce your risk of heart attack. Eat foods high in fiber · Eat a variety of grain products every day. Include whole-grain foods that have lots of fiber and nutrients. Examples of whole-grain foods include oats, whole wheat bread, and brown rice. · Buy whole-grain breads and cereals, instead of white bread or pastries. Limit salt and sodium · Limit how much salt and sodium you eat to help lower your blood pressure. · Taste food before you salt it. Add only a little salt when you think you need it. With time, your taste buds will adjust to less salt. · Eat fewer snack items, fast foods, and other high-salt, processed foods. Check food labels for the amount of sodium in packaged foods. · Choose low-sodium versions of canned goods (such as soups, vegetables, and beans). Limit sugar · Limit drinks and foods with added sugar. These include candy, desserts, and soda pop. Limit alcohol · Limit alcohol to no more than 2 drinks a day for men and 1 drink a day for women. Too much alcohol can cause health problems. When should you call for help? Watch closely for changes in your health, and be sure to contact your doctor if: 
· You would like help planning heart-healthy meals. Where can you learn more? Go to http://libby-orlando.info/. Enter V137 in the search box to learn more about \"Heart-Healthy Diet: Care Instructions. \" Current as of: April 3, 2017 Content Version: 11.3 © 1490-5152 [employer]. Care instructions adapted under license by [employer] (which disclaims liability or warranty for this information).  If you have questions about a medical condition or this instruction, always ask your healthcare professional. [employer] disclaims any warranty or liability for your use of this information. Introducing hospitals & HEALTH SERVICES! Josiah Garcia introduces [employer] patient portal. Now you can access parts of your medical record, email your doctor's office, and request medication refills online. 1. In your internet browser, go to [email or web address] 2. Click on the First Time User? Click Here link in the Sign In box. You will see the New Member Sign Up page. 3. Enter your [employer] Access Code exactly as it appears below. You will not need to use this code after youve completed the sign-up process. If you do not sign up before the expiration date, you must request a new code. · [employer] Access Code: [email or web address] Expires: 12/6/2017 12:06 PM 
 
4. Enter the last four digits of your Social Security Number (xxxx) and Date of Birth (mm/dd/yyyy) as indicated and click Submit. You will be taken to the next sign-up page. 5. Create a [employer] ID. This will be your [employer] login ID and cannot be changed, so think of one that is secure and easy to remember. 6. Create a [employer] password. You can change your password at any time. 7. Enter your Password Reset Question and Answer. This can be used at a later time if you forget your password. 8. Enter your e-mail address. You will receive e-mail notification when new information is available in [address] 9. Click Sign Up. You can now view and download portions of your medical record. 10. Click the Download Summary menu link to download a portable copy of your medical information. If you have questions, please visit the Frequently Asked Questions section of the [employer] website. Remember, [employer] is NOT to be used for urgent needs. For medical emergencies, dial 911. Now available from your iPhone and Android! Please provide this summary of care documentation to your next provider. Your primary care clinician is listed as ZAKIYA BEGUM. If you have any questions after today's visit, please call [phone].

## 2017-10-16 ENCOUNTER — TELEPHONE (OUTPATIENT)
Dept: FAMILY MEDICINE CLINIC | Age: 52
End: 2017-10-16

## 2017-10-16 NOTE — TELEPHONE ENCOUNTER
[place] that [place] does not have all of her blood pressure medications. Per patient she only has her lisinopril. Patient wants to know what she can do to make sure her blood pressure is controlled. . Please call patient at [phone]. Please advise.

## 2017-10-16 NOTE — TELEPHONE ENCOUNTER
Patient aware to take her lisinopril- HCT 2 x /day only if she checks her BP and it is very elevated SBP>160. Patient verbalizes understanding.

## 2017-11-29 ENCOUNTER — OFFICE VISIT (OUTPATIENT)
Dept: FAMILY MEDICINE CLINIC | Age: 52
End: 2017-11-29

## 2017-11-29 VITALS
RESPIRATION RATE: 18 BRPM | TEMPERATURE: 97.9 F | WEIGHT: 197 LBS | DIASTOLIC BLOOD PRESSURE: 76 MMHG | HEART RATE: 73 BPM | HEIGHT: 65 IN | SYSTOLIC BLOOD PRESSURE: 129 MMHG | BODY MASS INDEX: 32.82 KG/M2 | OXYGEN SATURATION: 98 %

## 2017-11-29 DIAGNOSIS — M25.512 ACUTE PAIN OF LEFT SHOULDER: Primary | ICD-10-CM

## 2017-11-29 DIAGNOSIS — M79.601 RIGHT ARM PAIN: ICD-10-CM

## 2017-11-29 RX ORDER — IBUPROFEN 800 MG/1
800 TABLET ORAL
Qty: 60 TAB | Refills: 1 | Status: SHIPPED | OUTPATIENT
Start: 2017-11-29 | End: 2018-06-15 | Stop reason: ALTCHOICE

## 2017-11-29 NOTE — PATIENT INSTRUCTIONS

## 2017-11-29 NOTE — PROGRESS NOTES
Patient is in the office today for left neck, left shoulder and right arm pain 8/10. Patient states she had her blood drawn for biometrics screening at work and stated about 1 week later she started having arm soreness. 1. Have you been to the ER, urgent care clinic since your last visit? Hospitalized since your last visit? No    2. Have you seen or consulted any other health care providers outside of the [address] [place] [address] since your last visit? Include any pap smears or colon screening.  No

## 2017-11-29 NOTE — MR AVS SNAPSHOT
Visit Information Date & Time Provider Department Dept. Phone Encounter #  
 11/29/2017  2:45 PM Ariel Connolly[address] [phone] [id number] Follow-up Instructions Return if symptoms worsen or fail to improve. Upcoming Health Maintenance Date Due Hepatitis C Screening 1965 PAP AKA CERVICAL CYTOLOGY 12/16/2014 BREAST CANCER SCRN MAMMOGRAM 2/27/2016 DTaP/Tdap/Td series (2 - Td) 6/16/2017 Influenza Age 5 to Adult 8/1/2017 COLONOSCOPY 5/13/2020 Allergies as of 11/29/2017  Review Complete On: 11/29/2017 By: Jerrica Holland LPN Severity Noted Reaction Type Reactions Bactrim [Sulfamethoprim Ds]  12/15/2011    Other (comments) Made mouth feel thick, speech slurred Current Immunizations  Reviewed on 11/9/2015 Name Date Influenza Vaccine 10/15/2015 Influenza Vaccine Nasal 8/1/2012 Influenza Vaccine Whole 8/16/2011 TDAP Vaccine 6/16/2007 Not reviewed this visit You Were Diagnosed With   
  
 Codes Comments Acute pain of left shoulder    -  Primary ICD-10-CM: M25.512 ICD-9-CM: 719.41 Right arm pain     ICD-10-CM: I05.586 ICD-9-CM: 729.5 Vitals BP Pulse Temp Resp Height(growth percentile) Weight(growth percentile) 129/76 (BP 1 Location: Left arm, BP Patient Position: Sitting) 73 97.9 °F (36.6 °C) (Oral) 18 5' 5\" (1.651 m) 197 lb (89.4 kg) SpO2 BMI OB Status Smoking Status 98% 32.78 kg/m2 Hysterectomy Never Smoker BMI and BSA Data Body Mass Index Body Surface Area 32.78 kg/m 2 2.02 m 2 Preferred Pharmacy Pharmacy Name Phone [facility]/PHARMACY #[id number] [address], [address] [facility] [place] [facility] [phone] Your Updated Medication List  
  
   
This list is accurate as of: 11/29/17  3:27 PM.  Always use your most recent med list.  
  
  
  
  
 Cholecalciferol (Vitamin D3) 2,000 unit Cap capsule Commonly known as:  VITAMIN D  
 Take 2,000 Units by mouth daily. CLINDESSE 2 % SR vaginal cream  
Generic drug:  clindamycin  
  
 cloNIDine 0.1 mg/24 hr patch Commonly known as:  CATAPRES  
APPLY 1 PATCH BY TRANSDERMAL ROUTE EVERY 7 DAYS. cpap machine kit  
by Does Not Apply route. Replace mask, headgear, heated hose, filters and accessories as needed for one year. CPAP pressure: 9 cwp Mask: standard Mirage FX plus Opus nasal pillows mask, or mask of choice Homecare Company: MED Download data 30 days after initiation of therapy  
  
 cyclobenzaprine 5 mg tablet Commonly known as:  FLEXERIL  
TAKE 1-2 TABLETS BY MOUTH AT NIGHT. diphenhydrAMINE 25 mg capsule Commonly known as:  BENADRYL Take 1 capsule by mouth nightly as needed. ibuprofen 800 mg tablet Commonly known as:  MOTRIN Take 1 Tab by mouth every eight (8) hours as needed for Pain.  
  
 lansoprazole 30 mg capsule Commonly known as:  PREVACID TAKE 1 CAPSULE BY MOUTH DAILY (BEFORE BREAKFAST). levalbuterol tartrate 45 mcg/actuation inhaler Commonly known as:  XOPENEX  
INHALE 1-2 PUFFS EVERY 4 TO 6 HOURS AS NEEDED  
  
 lisinopril-hydroCHLOROthiazide 20-12.5 mg per tablet Commonly known as:  PRINZIDE, ZESTORETIC  
TAKE 1 TABLET BY MOUTH ONCE DAILY  
  
 meclizine 25 mg tablet Commonly known as:  ANTIVERT  
TAKE 1 TAB BY MOUTH THREE (3) TIMES DAILY AS NEEDED FOR UP TO 10 DAYS. MILK THISTLE PO Take 1 Cap by mouth daily. * OTHER Tumeric 1 cap daily * OTHER  
alestrian hormone cream  
  
 PROBIOTIC PO Take  by mouth. raNITIdine 300 mg tablet Commonly known as:  ZANTAC Take 300 mg by mouth daily as needed. simvastatin 20 mg tablet Commonly known as:  ZOCOR  
TAKE 1 TABLET BY MOUTH AT BEDTIME  
  
 valACYclovir 500 mg tablet Commonly known as:  VALTREX Take 500 mg by mouth daily. vitamin e 400 unit Tab Take 1 Cap by mouth daily. YUVAFEM 10 mcg Tab vaginal tablet Generic drug:  estradiol INSERT 1 APPLICATORFUL VAGINALLY AT BEDTIME FOR 14 DAYS THEN TWICE PER WEEK * Notice: This list has 2 medication(s) that are the same as other medications prescribed for you. Read the directions carefully, and ask your doctor or other care provider to review them with you. Prescriptions Sent to Pharmacy Refills  
 ibuprofen (MOTRIN) 800 mg tablet 1 Sig: Take 1 Tab by mouth every eight (8) hours as needed for Pain. Class: Normal  
 [facility] [address] [place] [address], [address] [place] [address],[address] #: [phone] Route: Oral  
  
Follow-up Instructions Return if symptoms worsen or fail to improve. Patient Instructions Neck: Exercises Your Care Instructions Here are some examples of typical rehabilitation exercises for your condition. Start each exercise slowly. Ease off the exercise if you start to have pain. Your doctor or physical therapist will tell you when you can start these exercises and which ones will work best for you. How to do the exercises Neck stretch 1. This stretch works best if you keep your shoulder down as you lean away from it. To help you remember to do this, start by relaxing your shoulders and lightly holding on to your thighs or your chair. 2. Tilt your head toward your shoulder and hold for 15 to 30 seconds. Let the weight of your head stretch your muscles. 3. If you would like a little added stretch, use your hand to gently and steadily pull your head toward your shoulder. For example, keeping your right shoulder down, lean your head to the left. 4. Repeat 2 to 4 times toward each shoulder. Diagonal neck stretch 1. Turn your head slightly toward the direction you will be stretching, and tilt your head diagonally toward your chest and hold for 15 to 30 seconds.  
2. If you would like a little added stretch, use your hand to gently and steadily pull your head forward on the diagonal. 
 3. Repeat 2 to 4 times toward each side. Dorsal glide stretch The dorsal glide stretches the back of the neck. If you feel pain, do not glide so far back. Some people find this exercise easier to do while lying on their backs with an ice pack on the neck. 1. Sit or stand tall and look straight ahead. 2. Slowly tuck your chin as you glide your head backward over your body 3. Hold for a count of 6, and then relax for up to 10 seconds. 4. Repeat 8 to 12 times. Chest and shoulder stretch 1. Sit or stand tall and glide your head backward as in the dorsal glide stretch. 2. Raise both arms so that your hands are next to your ears. 3. Take a deep breath, and as you breathe out, lower your elbows down and behind your back. You will feel your shoulder blades slide down and together, and at the same time you will feel a stretch across your chest and the front of your shoulders. 4. Hold for about 6 seconds, and then relax for up to 10 seconds. 5. Repeat 8 to 12 times. Strengthening: Hands on head 1. Move your head backward, forward, and side to side against gentle pressure from your hands, holding each position for about 6 seconds. 2. Repeat 8 to 12 times. Follow-up care is a key part of your treatment and safety. Be sure to make and go to all appointments, and call your doctor if you are having problems. It's also a good idea to know your test results and keep a list of the medicines you take. Where can you learn more? Go to http://libby-orlando.info/. Enter P975 in the search box to learn more about \"Neck: Exercises. \" Current as of: March 21, 2017 Content Version: 11.4 © 8044-7830 Healthwise, [employer]. Care instructions adapted under license by [employer] (which disclaims liability or warranty for this information).  If you have questions about a medical condition or this instruction, always ask your healthcare professional. Nika Khoury[employer] disclaims any warranty or liability for your use of this information. Introducing [facility] & [facility]! Gm Ortez introduces Aiming patient portal. Now you can access parts of your medical record, email your doctor's office, and request medication refills online. 1. In your internet browser, go to [email or web address] 2. Click on the First Time User? Click Here link in the Sign In box. You will see the New Member Sign Up page. 3. Enter your Aiming Access Code exactly as it appears below. You will not need to use this code after youve completed the sign-up process. If you do not sign up before the expiration date, you must request a new code. · Aiming Access Code: [email or web address] Expires: 12/6/2017 11:06 AM 
 
4. Enter the last four digits of your Social Security Number (xxxx) and Date of Birth (mm/dd/yyyy) as indicated and click Submit. You will be taken to the next sign-up page. 5. Create a Aiming ID. This will be your Aiming login ID and cannot be changed, so think of one that is secure and easy to remember. 6. Create a Aiming password. You can change your password at any time. 7. Enter your Password Reset Question and Answer. This can be used at a later time if you forget your password. 8. Enter your e-mail address. You will receive e-mail notification when new information is available in [address] 9. Click Sign Up. You can now view and download portions of your medical record. 10. Click the Download Summary menu link to download a portable copy of your medical information. If you have questions, please visit the Frequently Asked Questions section of the Aiming website. Remember, Aiming is NOT to be used for urgent needs. For medical emergencies, dial 911. Now available from your iPhone and Android! Please provide this summary of care documentation to your next provider. Your primary care clinician is listed as ZAKIYA BEGUM. If you have any questions after today's visit, please call [phone].

## 2017-11-29 NOTE — PROGRESS NOTES
Celestina Banegas is a 45 y.o.  female and presents with Neck Pain (left side); Shoulder Pain (left shoulder ); and Arm Pain (right arm)      SUBJECTIVE:    Pt about 2 weeks ago had blood draw from her right anterior cubital fossa. About 1 week later she began to have right arm pain worst with movement. She has been using heat compresses with minimal improvement. Pt also has had exacerbation of her left shoulder and neck pain. She has done PT in the past but now wants to try a chiropractor. Respiratory ROS: negative for - shortness of breath  Cardiovascular ROS: negative for - chest pain    Current Outpatient Prescriptions   Medication Sig    ibuprofen (MOTRIN) 800 mg tablet Take 1 Tab by mouth every eight (8) hours as needed for Pain.  simvastatin (ZOCOR) 20 mg tablet TAKE 1 TABLET BY MOUTH AT BEDTIME    lisinopril-hydroCHLOROthiazide (PRINZIDE, ZESTORETIC) 20-12.5 mg per tablet TAKE 1 TABLET BY MOUTH ONCE DAILY    cloNIDine (CATAPRES) 0.1 mg/24 hr patch APPLY 1 PATCH BY TRANSDERMAL ROUTE EVERY 7 DAYS.  levalbuterol tartrate (XOPENEX) 45 mcg/actuation inhaler INHALE 1-2 PUFFS EVERY 4 TO 6 HOURS AS NEEDED    YUVAFEM 10 mcg tab vaginal tablet INSERT 1 APPLICATORFUL VAGINALLY AT BEDTIME FOR 14 DAYS THEN TWICE PER WEEK    lansoprazole (PREVACID) 30 mg capsule TAKE 1 CAPSULE BY MOUTH DAILY (BEFORE BREAKFAST).  LACTOBACILLUS ACIDOPHILUS (PROBIOTIC PO) Take  by mouth.  OTHER alestrian hormone cream    OTHER Tumeric 1 cap daily    ranitidine (ZANTAC) 300 mg tablet Take 300 mg by mouth daily as needed.  CLINDESSE 2 % SR vaginal cream     MILK THISTLE PO Take 1 Cap by mouth daily.  vitamin e 400 unit tab Take 1 Cap by mouth daily.  Cholecalciferol, Vitamin D3, (VITAMIN D) 2,000 unit cap capsule Take 2,000 Units by mouth daily.  cpap machine kit by Does Not Apply route. Replace mask, headgear, heated hose, filters and accessories as needed for one year.    CPAP pressure: 9 cwp  Mask: standard Mirage FX plus Opus nasal pillows mask, or mask of choice  Homecare Company: MED  Download data 30 days after initiation of therapy    cyclobenzaprine (FLEXERIL) 5 mg tablet TAKE 1-2 TABLETS BY MOUTH AT NIGHT.  meclizine (ANTIVERT) 25 mg tablet TAKE 1 TAB BY MOUTH THREE (3) TIMES DAILY AS NEEDED FOR UP TO 10 DAYS.  valACYclovir (VALTREX) 500 mg tablet Take 500 mg by mouth daily.  diphenhydrAMINE (BENADRYL) 25 mg capsule Take 1 capsule by mouth nightly as needed. No current facility-administered medications for this visit. OBJECTIVE:  alert, well appearing, and in no distress  Visit Vitals    /76 (BP 1 Location: Left arm, BP Patient Position: Sitting)    Pulse 73    Temp 97.9 °F (36.6 °C) (Oral)    Resp 18    Ht 5' 5\" (1.651 m)    Wt 197 lb (89.4 kg)    SpO2 98%    BMI 32.78 kg/m2      well developed and well nourished  Musculoskeletal - right forearm tenderness to palpation and with movement. Assessment/Plan      ICD-10-CM ICD-9-CM    1. Acute pain of left shoulder M25.512 719.41 Pt to f/u with chiropractor    2. Right arm pain M79.601 729.5 Will treat with ibuprofen (MOTRIN) 800 mg tablet TID for possible strain. If not improving will refer to orthopedics      Follow-up Disposition:  Return if symptoms worsen or fail to improve. Reviewed plan of care. Patient has provided input and agrees with goals.

## 2017-12-13 DIAGNOSIS — E78.00 PURE HYPERCHOLESTEROLEMIA: ICD-10-CM

## 2017-12-26 ENCOUNTER — HOSPITAL ENCOUNTER (OUTPATIENT)
Dept: ULTRASOUND IMAGING | Age: 52
Discharge: HOME | End: 2017-12-26
Attending: INTERNAL MEDICINE
Payer: OTHER GOVERNMENT

## 2017-12-26 DIAGNOSIS — R14.0 ABDOMINAL DISTENSION, GASEOUS: ICD-10-CM

## 2017-12-26 PROCEDURE — 76700 US EXAM ABDOM COMPLETE: CPT

## 2018-02-19 ENCOUNTER — TELEPHONE (OUTPATIENT)
Dept: SLEEP MEDICINE | Age: 53
End: 2018-02-19

## 2018-03-01 ENCOUNTER — OFFICE VISIT (OUTPATIENT)
Dept: FAMILY MEDICINE CLINIC | Age: 53
End: 2018-03-01

## 2018-03-01 VITALS
DIASTOLIC BLOOD PRESSURE: 90 MMHG | HEIGHT: 65 IN | HEART RATE: 73 BPM | BODY MASS INDEX: 31.99 KG/M2 | WEIGHT: 192 LBS | OXYGEN SATURATION: 98 % | SYSTOLIC BLOOD PRESSURE: 124 MMHG | RESPIRATION RATE: 18 BRPM | TEMPERATURE: 98.1 F

## 2018-03-01 DIAGNOSIS — J20.9 ACUTE BRONCHITIS, UNSPECIFIED ORGANISM: Primary | ICD-10-CM

## 2018-03-01 RX ORDER — AZITHROMYCIN 250 MG/1
TABLET, FILM COATED ORAL
Qty: 6 TAB | Refills: 0 | Status: SHIPPED | OUTPATIENT
Start: 2018-03-01 | End: 2018-04-27 | Stop reason: ALTCHOICE

## 2018-03-01 RX ORDER — LEVALBUTEROL TARTRATE 45 UG/1
AEROSOL, METERED ORAL
Qty: 1 INHALER | Refills: 0 | Status: SHIPPED | OUTPATIENT
Start: 2018-03-01 | End: 2020-03-03 | Stop reason: SDUPTHER

## 2018-03-01 NOTE — MR AVS SNAPSHOT
[facility] [address] 
[phone] Patient: Irais Aguayo MRN: [id number] :1965 Visit Information Date & Time Provider Department Dept. Phone Encounter #  
 3/1/2018  8:45 AM Demond Marcelino, [address] [place] [address] [id number] Follow-up Instructions Return if symptoms worsen or fail to improve, Dr. Mele Marcial. Upcoming Health Maintenance Date Due Hepatitis C Screening 1965 PAP AKA CERVICAL CYTOLOGY 2014 BREAST CANCER SCRN MAMMOGRAM 2016 DTaP/Tdap/Td series (2 - Td) 2017 Influenza Age 5 to Adult 2017 COLONOSCOPY 2020 Allergies as of 3/1/2018  Review Complete On: 3/1/2018 By: Demond Marcelino MD  
  
 Severity Noted Reaction Type Reactions Bactrim [Sulfamethoprim Ds]  12/15/2011    Other (comments) Made mouth feel thick, speech slurred Current Immunizations  Reviewed on 2015 Name Date Influenza Vaccine 10/15/2015 Influenza Vaccine Nasal 2012 Influenza Vaccine Whole 2011 TDAP Vaccine 2007 Not reviewed this visit You Were Diagnosed With   
  
 Codes Comments Acute bronchitis, unspecified organism    -  Primary ICD-10-CM: J20.9 ICD-9-CM: 466.0 Vitals BP Pulse Temp Resp Height(growth percentile) Weight(growth percentile) 124/90 (BP 1 Location: Left arm, BP Patient Position: Sitting) 73 98.1 °F (36.7 °C) (Oral) 18 5' 5\" (1.651 m) 192 lb (87.1 kg) SpO2 BMI OB Status Smoking Status 98% 31.95 kg/m2 Hysterectomy Never Smoker BMI and BSA Data Body Mass Index Body Surface Area 31.95 kg/m 2 2 m 2 Preferred Pharmacy Pharmacy Name Phone [id number] Ravin Evans, [address] [facility] [place] [facility] [phone] Your Updated Medication List  
  
   
This list is accurate as of 3/1/18  9:07 AM.  Always use your most recent med list.  
  
  
  
  
 azithromycin 250 mg tablet Commonly known as:  Kyler Garcia Take two tablets today then one tablet daily Cholecalciferol (Vitamin D3) 2,000 unit Cap capsule Commonly known as:  VITAMIN D Take 2,000 Units by mouth daily. CLINDESSE 2 % SR vaginal cream  
Generic drug:  clindamycin  
  
 cloNIDine 0.1 mg/24 hr patch Commonly known as:  CATAPRES  
APPLY 1 PATCH BY TRANSDERMAL ROUTE EVERY 7 DAYS. cpap machine kit  
by Does Not Apply route. Replace mask, headgear, heated hose, filters and accessories as needed for one year. CPAP pressure: 9 cwp Mask: standard Mirage FX plus Opus nasal pillows mask, or mask of choice Homecare Company: MED Download data 30 days after initiation of therapy  
  
 cyclobenzaprine 5 mg tablet Commonly known as:  FLEXERIL  
TAKE 1-2 TABLETS BY MOUTH AT NIGHT. diphenhydrAMINE 25 mg capsule Commonly known as:  BENADRYL Take 1 capsule by mouth nightly as needed. ibuprofen 800 mg tablet Commonly known as:  MOTRIN Take 1 Tab by mouth every eight (8) hours as needed for Pain.  
  
 lansoprazole 30 mg capsule Commonly known as:  PREVACID TAKE 1 CAPSULE BY MOUTH DAILY (BEFORE BREAKFAST). levalbuterol tartrate 45 mcg/actuation inhaler Commonly known as:  XOPENEX  
INHALE 1-2 PUFFS EVERY 4 TO 6 HOURS AS NEEDED  
  
 lisinopril-hydroCHLOROthiazide 20-12.5 mg per tablet Commonly known as:  PRINZIDE, ZESTORETIC  
TAKE 1 TABLET BY MOUTH ONCE DAILY  
  
 meclizine 25 mg tablet Commonly known as:  ANTIVERT  
TAKE 1 TAB BY MOUTH THREE (3) TIMES DAILY AS NEEDED FOR UP TO 10 DAYS. MILK THISTLE PO Take 1 Cap by mouth daily. OTHER Tumeric 1 cap daily OTHER  
alestrian hormone cream  
  
 PROBIOTIC PO Take  by mouth. raNITIdine 300 mg tablet Commonly known as:  ZANTAC Take 300 mg by mouth daily as needed. simvastatin 20 mg tablet Commonly known as:  ZOCOR  
 TAKE 1 TABLET BY MOUTH AT BEDTIME  
  
 valACYclovir 500 mg tablet Commonly known as:  VALTREX Take 500 mg by mouth daily. vitamin e 400 unit Tab Take 1 Cap by mouth daily. YUVAFEM 10 mcg Tab vaginal tablet Generic drug:  estradiol INSERT 1 APPLICATORFUL VAGINALLY AT BEDTIME FOR 14 DAYS THEN TWICE PER WEEK Prescriptions Sent to Pharmacy Refills  
 levalbuterol tartrate (XOPENEX) 45 mcg/actuation inhaler 0 Sig: INHALE 1-2 PUFFS EVERY 4 TO 6 HOURS AS NEEDED Class: Normal  
 Pharmacy: [facility]/pharmacy #[id number] [place] [facility] [address] Ph #: [phone]  
 azithromycin (ZITHROMAX) 250 mg tablet 0 Sig: Take two tablets today then one tablet daily Class: Normal  
 Pharmacy: [facility]/pharmacy [address] [place] [address],[address] [place] [address] Ph #: [phone] Follow-up Instructions Return if symptoms worsen or fail to improve, Dr. Dhara Ellis. To-Do List   
 03/05/2018 8:30 AM  
(Arrive by 8:00 AM) Appointment with Darien Pagan PT at [facility]  ([phone]) For Lymphedema patients, please wear loose fitting clothes. Please arrive 30 minutes prior to appointment to register 03/07/2018 8:30 AM  
(Arrive by 8:00 AM) Appointment with Darien Pagan PT at [facility]  ([phone]) For Lymphedema patients, please wear loose fitting clothes. Please arrive 30 minutes prior to appointment to register 03/12/2018 9:30 AM  
(Arrive by 9:00 AM) Appointment with Darien Pagan PT at [facility]  ([phone]) For Lymphedema patients, please wear loose fitting clothes. Please arrive 30 minutes prior to appointment to register 03/14/2018 9:30 AM  
(Arrive by 9:00 AM) Appointment [place]agan [place] [address] ([phone]) For Lymphedema patients, please wear loose fitting clothes. Please arrive 30 minutes prior to appointment to register Patient Instructions Bronchitis: Care Instructions Your Care Instructions Bronchitis is inflammation of the bronchial tubes, which carry air to the lungs. The tubes swell and produce mucus, or phlegm. The mucus and inflamed bronchial tubes make you cough. You may have trouble breathing. Most cases of bronchitis are caused by viruses like those that cause colds. Antibiotics usually do not help and they may be harmful. Bronchitis usually develops rapidly and lasts about 2 to 3 weeks in otherwise healthy people. Follow-up care is a key part of your treatment and safety. Be sure to make and go to all appointments, and call your doctor if you are having problems. It's also a good idea to know your test results and keep a list of the medicines you take. How can you care for yourself at home? · Take all medicines exactly as prescribed. Call your doctor if you think you are having a problem with your medicine. · Get some extra rest. 
· Take an over-the-counter pain medicine, such as acetaminophen (Tylenol), ibuprofen (Advil, Motrin), or naproxen (Aleve) to reduce fever and relieve body aches. Read and follow all instructions on the label. · Do not take two or more pain medicines at the same time unless the doctor told you to. Many pain medicines have acetaminophen, which is Tylenol. Too much acetaminophen (Tylenol) can be harmful. · Take an over-the-counter cough medicine that contains dextromethorphan to help quiet a dry, hacking cough so that you can sleep. Avoid cough medicines that have more than one active ingredient. Read and follow all instructions on the label. · Breathe moist air from a humidifier, hot shower, or sink filled with hot water. The heat and moisture will thin mucus so you can cough it out. · Do not smoke. Smoking can make bronchitis worse. If you need help quitting, talk to your doctor about stop-smoking programs and medicines. These can increase your chances of quitting for good. When should you call for help? Call 911 anytime you think you may need emergency care. For example, call if: 
? · You have severe trouble breathing. ?Call your doctor now or seek immediate medical care if: 
? · You have new or worse trouble breathing. ? · You cough up dark brown or bloody mucus (sputum). ? · You have a new or higher fever. ? · You have a new rash. ? Watch closely for changes in your health, and be sure to contact your doctor if: 
? · You cough more deeply or more often, especially if you notice more mucus or a change in the color of your mucus. ? · You are not getting better as expected. Where can you learn more? Go to http://libby-orlando.info/. Enter H333 in the search box to learn more about \"Bronchitis: Care Instructions. \" Current as of: May 12, 2017 Content Version: 11.4 © 0440-0978 [employer]. Care instructions adapted under license by [employer] (which disclaims liability or warranty for this information). If you have questions about a medical condition or this instruction, always ask your healthcare professional. [place] [address] any warranty or liability for your use of this information. Introducing [facility] & HEALTH SERVICES! Kristina Borrego introduces Loginza patient portal. Now you can access parts of your medical record, email your doctor's office, and request medication refills online. 1. In your internet browser, go to [email or web address] 2. Click on the First Time User? Click Here link in the Sign In box. You will see the New Member Sign Up page. 3. Enter your Loginza Access Code exactly as it appears below. You will not need to use this code after youve completed the sign-up process. If you do not sign up before the expiration date, you must request a new code.  
 
· Loginza Access Code: [email or web address] 
 Expires: 3/14/2018 12:11 PM 
 
4. Enter the last four digits of your Social Security Number (xxxx) and Date of Birth (mm/dd/yyyy) as indicated and click Submit. You will be taken to the next sign-up page. 5. Create a Plink Search ID. This will be your Plink Search login ID and cannot be changed, so think of one that is secure and easy to remember. 6. Create a Plink Search password. You can change your password at any time. 7. Enter your Password Reset Question and Answer. This can be used at a later time if you forget your password. 8. Enter your e-mail address. You will receive e-mail notification when new information is available in [address] 9. Click Sign Up. You can now view and download portions of your medical record. 10. Click the Download Summary menu link to download a portable copy of your medical information. If you have questions, please visit the Frequently Asked Questions section of the Plink Search website. Remember, Plink Search is NOT to be used for urgent needs. For medical emergencies, dial 911. Now available from your iPhone and Android! Please provide this summary of care documentation to your next provider. Your primary care clinician is listed as ZAKIYA BEGUM. If you have any questions after today's visit, please call [phone].

## 2018-03-01 NOTE — PATIENT INSTRUCTIONS
Bronchitis: Care Instructions  Your Care Instructions    Bronchitis is inflammation of the bronchial tubes, which carry air to the lungs. The tubes swell and produce mucus, or phlegm. The mucus and inflamed bronchial tubes make you cough. You may have trouble breathing. Most cases of bronchitis are caused by viruses like those that cause colds. Antibiotics usually do not help and they may be harmful. Bronchitis usually develops rapidly and lasts about 2 to 3 weeks in otherwise healthy people. Follow-up care is a key part of your treatment and safety. Be sure to make and go to all appointments, and call your doctor if you are having problems. It's also a good idea to know your test results and keep a list of the medicines you take. How can you care for yourself at home? · Take all medicines exactly as prescribed. Call your doctor if you think you are having a problem with your medicine. · Get some extra rest.  · Take an over-the-counter pain medicine, such as acetaminophen (Tylenol), ibuprofen (Advil, Motrin), or naproxen (Aleve) to reduce fever and relieve body aches. Read and follow all instructions on the label. · Do not take two or more pain medicines at the same time unless the doctor told you to. Many pain medicines have acetaminophen, which is Tylenol. Too much acetaminophen (Tylenol) can be harmful. · Take an over-the-counter cough medicine that contains dextromethorphan to help quiet a dry, hacking cough so that you can sleep. Avoid cough medicines that have more than one active ingredient. Read and follow all instructions on the label. · Breathe moist air from a humidifier, hot shower, or sink filled with hot water. The heat and moisture will thin mucus so you can cough it out. · Do not smoke. Smoking can make bronchitis worse. If you need help quitting, talk to your doctor about stop-smoking programs and medicines. These can increase your chances of quitting for good.   When should you call for help? Call 911 anytime you think you may need emergency care. For example, call if:  ? · You have severe trouble breathing. ?Call your doctor now or seek immediate medical care if:  ? · You have new or worse trouble breathing. ? · You cough up dark brown or bloody mucus (sputum). ? · You have a new or higher fever. ? · You have a new rash. ? Watch closely for changes in your health, and be sure to contact your doctor if:  ? · You cough more deeply or more often, especially if you notice more mucus or a change in the color of your mucus. ? · You are not getting better as expected. Where can you learn more? Go to http://libby-orlando.info/. Enter H333 in the search box to learn more about \"Bronchitis: Care Instructions. \"  Current as of: May 12, 2017  Content Version: 11.4  © 0218-7592 [employer]. Care instructions adapted under license by [employer] (which disclaims liability or warranty for this information). If you have questions about a medical condition or this instruction, always ask your healthcare professional. [address] any warranty or liability for your use of this information.

## 2018-03-01 NOTE — PROGRESS NOTES
Jyoti Brooks, 47 y.o.,  female    SUBJECTIVE  Cough x 5 days (Dr. Joane Fabry pt)    Pt c/o nasal congestion, cough, wheezing, myalgia. 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 She has been on xopenex in the past for similar bronchitis event, prefers this with palpitations on albuterol. She is a non smoker. ROS:  See HPI, all others negative        Patient Active Problem List   Diagnosis Code    Plantar fasciitis M72.2    HTN (hypertension) I10    Hyperlipidemia E78.5    SUBHASH on CPAP G47.33, Z99.89    GERD (gastroesophageal reflux disease) K21.9    Undiagnosed cardiac murmurs R01.1    Obesity, unspecified E66.9    Cardiomegaly I51.7    Vitamin D deficiency E55.9       Current Outpatient Prescriptions   Medication Sig Dispense Refill    levalbuterol tartrate (XOPENEX) 45 mcg/actuation inhaler INHALE 1-2 PUFFS EVERY 4 TO 6 HOURS AS NEEDED 1 Inhaler 0    azithromycin (ZITHROMAX) 250 mg tablet Take two tablets today then one tablet daily 6 Tab 0    ibuprofen (MOTRIN) 800 mg tablet Take 1 Tab by mouth every eight (8) hours as needed for Pain. 60 Tab 1    simvastatin (ZOCOR) 20 mg tablet TAKE 1 TABLET BY MOUTH AT BEDTIME 90 Tab 2    lisinopril-hydroCHLOROthiazide (PRINZIDE, ZESTORETIC) 20-12.5 mg per tablet TAKE 1 TABLET BY MOUTH ONCE DAILY 90 Tab 2    cloNIDine (CATAPRES) 0.1 mg/24 hr patch APPLY 1 PATCH BY TRANSDERMAL ROUTE EVERY 7 DAYS. 12 Patch 3    cyclobenzaprine (FLEXERIL) 5 mg tablet TAKE 1-2 TABLETS BY MOUTH AT NIGHT. 90 Tab 1    YUVAFEM 10 mcg tab vaginal tablet INSERT 1 APPLICATORFUL VAGINALLY AT BEDTIME FOR 14 DAYS THEN TWICE PER WEEK  5    meclizine (ANTIVERT) 25 mg tablet TAKE 1 TAB BY MOUTH THREE (3) TIMES DAILY AS NEEDED FOR UP TO 10 DAYS.  0    LACTOBACILLUS ACIDOPHILUS (PROBIOTIC PO) Take  by mouth.  OTHER alestrian hormone cream      OTHER Tumeric 1 cap daily      valACYclovir (VALTREX) 500 mg tablet Take 500 mg by mouth daily.   3    CLINDESSE 2 % SR vaginal cream       MILK THISTLE PO Take 1 Cap by mouth daily.  vitamin e 400 unit tab Take 1 Cap by mouth daily.  Cholecalciferol, Vitamin D3, (VITAMIN D) 2,000 unit cap capsule Take 2,000 Units by mouth daily. 90 capsule 2    diphenhydrAMINE (BENADRYL) 25 mg capsule Take 1 capsule by mouth nightly as needed. 90 capsule 2    lansoprazole (PREVACID) 30 mg capsule TAKE 1 CAPSULE BY MOUTH DAILY (BEFORE BREAKFAST). 90 Cap 3    ranitidine (ZANTAC) 300 mg tablet Take 300 mg by mouth daily as needed. 2    cpap machine kit by Does Not Apply route. Replace mask, headgear, heated hose, filters and accessories as needed for one year. CPAP pressure: 9 cwp  Mask: standard Mirage FX plus Opus nasal pillows mask, or mask of choice  [employer]: [employer]  Download data 30 days after initiation of therapy 1 Kit 0       Allergies   Allergen Reactions    Bactrim [Sulfamethoprim Ds] Other (comments)     Made mouth feel thick, speech slurred       Past Medical History:   Diagnosis Date    Cardiomegaly     Diverticulosis     GERD (gastroesophageal reflux disease)     H/O tubal ligation     H/O: hysterectomy     Hiatal hernia 2007    History of appendectomy     Hypercholesterolemia     Hypertension     Obesity, unspecified     SUBHASH on CPAP 4/8/2013    S/P mastectomy, bilateral     2010 : preventative    Undiagnosed cardiac murmurs     Unspecified adverse effect of anesthesia     was hypothermic in past       Social History     Social History    Marital status:      Spouse name: N/A    Number of children: N/A    Years of education: N/A     Occupational History    Not on file.      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       Family History   Problem Relation Age of Onset    Cancer Mother      breast    Cancer Sister      breast    Diabetes Maternal Grandmother     Hypertension Maternal Grandmother     Cancer Maternal Grandmother      ovarian    Heart Disease Maternal Grandfather 61     MI    Hypertension Maternal Aunt     Other Maternal Aunt      renal failure    Hypertension Maternal Uncle          OBJECTIVE    Physical Exam:     Visit Vitals    /90 (BP 1 Location: Left arm, BP Patient Position: Sitting)    Pulse 73    Temp 98.1 °F (36.7 °C) (Oral)    Resp 18    Ht 5' 5\" (1.651 m)    Wt 192 lb (87.1 kg)    SpO2 98%    BMI 31.95 kg/m2       General: alert, mildly ill-appearing,in no apparent distress or pain  Head: atraumatic. Non-tender maxillary and frontal sinuses  Eyes: Lids with no discharge, no matting, conjunctivae clear and non injected, full EOMs, PERLLA  Ears: pinna non-tender, external auditory canal patent, TM intact  Mouth/throat:tonsils non enlarged, pharynx non erythematous and no lesion, nasal mucosa normal  Neck: supple, no adenopathy palpated  CVS: normal rate, regular rhythm, distinct S1 and S2  Lungs:clear to ausculation bilaterally, no crackles, wheezing or rhonchi noted  Abdomen: normoactive bowel sounds, soft, non-tender  Extremities: no edema, no cyanosis,  Skin: warm, no lesions, rashes noted  Psych:  mood and affect normal        ASSESSMENT/PLAN  Diagnoses and all orders for this visit:    1. Acute bronchitis, unspecified organism  -     levalbuterol tartrate (XOPENEX) 45 mcg/actuation inhaler; INHALE 1-2 PUFFS EVERY 4 TO 6 HOURS AS NEEDED  -     azithromycin (ZITHROMAX) 250 mg tablet; Take two tablets today then one tablet daily    Follow-up Disposition:  Return if symptoms worsen or fail to improve, Dr. Franci Rivas. Patient understands plan of care. Patient has provided input and agrees with goals.

## 2018-04-01 ENCOUNTER — ANESTHESIA EVENT (OUTPATIENT)
Dept: ENDOSCOPY | Age: 53
End: 2018-04-01
Payer: OTHER GOVERNMENT

## 2018-04-02 ENCOUNTER — ANESTHESIA (OUTPATIENT)
Dept: ENDOSCOPY | Age: 53
End: 2018-04-02
Payer: OTHER GOVERNMENT

## 2018-04-02 ENCOUNTER — HOSPITAL ENCOUNTER (OUTPATIENT)
Age: 53
Setting detail: OUTPATIENT SURGERY
Discharge: HOME | End: 2018-04-02
Attending: INTERNAL MEDICINE | Admitting: INTERNAL MEDICINE
Payer: OTHER GOVERNMENT

## 2018-04-02 VITALS
DIASTOLIC BLOOD PRESSURE: 82 MMHG | RESPIRATION RATE: 23 BRPM | OXYGEN SATURATION: 100 % | SYSTOLIC BLOOD PRESSURE: 120 MMHG | HEART RATE: 68 BPM | BODY MASS INDEX: 31.99 KG/M2 | TEMPERATURE: 97.5 F | HEIGHT: 65 IN | WEIGHT: 192 LBS

## 2018-04-02 PROCEDURE — 77030009426 HC FCPS BIOP ENDOSC BSC -B: Performed by: INTERNAL MEDICINE

## 2018-04-02 PROCEDURE — 74011000250 HC RX REV CODE- 250

## 2018-04-02 PROCEDURE — 74011250636 HC RX REV CODE- 250/636: Performed by: ANESTHESIOLOGY

## 2018-04-02 PROCEDURE — 74011250636 HC RX REV CODE- 250/636

## 2018-04-02 PROCEDURE — 76040000019: Performed by: INTERNAL MEDICINE

## 2018-04-02 PROCEDURE — 88342 IMHCHEM/IMCYTCHM 1ST ANTB: CPT | Performed by: INTERNAL MEDICINE

## 2018-04-02 PROCEDURE — 74011000250 HC RX REV CODE- 250: Performed by: ANESTHESIOLOGY

## 2018-04-02 PROCEDURE — 76060000031 HC ANESTHESIA FIRST 0.5 HR: Performed by: INTERNAL MEDICINE

## 2018-04-02 PROCEDURE — 88305 TISSUE EXAM BY PATHOLOGIST: CPT | Performed by: INTERNAL MEDICINE

## 2018-04-02 RX ORDER — SODIUM CHLORIDE, SODIUM LACTATE, POTASSIUM CHLORIDE, CALCIUM CHLORIDE 600; 310; 30; 20 MG/100ML; MG/100ML; MG/100ML; MG/100ML
75 INJECTION, SOLUTION INTRAVENOUS CONTINUOUS
Status: DISCONTINUED | OUTPATIENT
Start: 2018-04-02 | End: 2018-04-02 | Stop reason: HOSPADM

## 2018-04-02 RX ORDER — DEXTROMETHORPHAN/PSEUDOEPHED 2.5-7.5/.8
1.2 DROPS ORAL
Status: DISCONTINUED | OUTPATIENT
Start: 2018-04-02 | End: 2018-04-02 | Stop reason: HOSPADM

## 2018-04-02 RX ORDER — PROPOFOL 10 MG/ML
INJECTION, EMULSION INTRAVENOUS AS NEEDED
Status: DISCONTINUED | OUTPATIENT
Start: 2018-04-02 | End: 2018-04-02 | Stop reason: HOSPADM

## 2018-04-02 RX ORDER — SODIUM CHLORIDE 0.9 % (FLUSH) 0.9 %
5-10 SYRINGE (ML) INJECTION EVERY 8 HOURS
Status: DISCONTINUED | OUTPATIENT
Start: 2018-04-02 | End: 2018-04-02 | Stop reason: HOSPADM

## 2018-04-02 RX ORDER — INSULIN LISPRO 100 [IU]/ML
INJECTION, SOLUTION INTRAVENOUS; SUBCUTANEOUS ONCE
Status: DISCONTINUED | OUTPATIENT
Start: 2018-04-02 | End: 2018-04-02 | Stop reason: HOSPADM

## 2018-04-02 RX ORDER — EPINEPHRINE 0.1 MG/ML
1 INJECTION INTRACARDIAC; INTRAVENOUS
Status: DISCONTINUED | OUTPATIENT
Start: 2018-04-02 | End: 2018-04-02 | Stop reason: HOSPADM

## 2018-04-02 RX ORDER — LIDOCAINE HYDROCHLORIDE 20 MG/ML
INJECTION, SOLUTION EPIDURAL; INFILTRATION; INTRACAUDAL; PERINEURAL AS NEEDED
Status: DISCONTINUED | OUTPATIENT
Start: 2018-04-02 | End: 2018-04-02 | Stop reason: HOSPADM

## 2018-04-02 RX ORDER — NALOXONE HYDROCHLORIDE 0.4 MG/ML
0.4 INJECTION, SOLUTION INTRAMUSCULAR; INTRAVENOUS; SUBCUTANEOUS
Status: DISCONTINUED | OUTPATIENT
Start: 2018-04-02 | End: 2018-04-02 | Stop reason: HOSPADM

## 2018-04-02 RX ORDER — SODIUM CHLORIDE 0.9 % (FLUSH) 0.9 %
5-10 SYRINGE (ML) INJECTION AS NEEDED
Status: DISCONTINUED | OUTPATIENT
Start: 2018-04-02 | End: 2018-04-02 | Stop reason: HOSPADM

## 2018-04-02 RX ORDER — ATROPINE SULFATE 0.1 MG/ML
0.5 INJECTION INTRAVENOUS
Status: DISCONTINUED | OUTPATIENT
Start: 2018-04-02 | End: 2018-04-02 | Stop reason: HOSPADM

## 2018-04-02 RX ORDER — FLUMAZENIL 0.1 MG/ML
0.2 INJECTION INTRAVENOUS
Status: DISCONTINUED | OUTPATIENT
Start: 2018-04-02 | End: 2018-04-02 | Stop reason: HOSPADM

## 2018-04-02 RX ADMIN — PROPOFOL 100 MG: 10 INJECTION, EMULSION INTRAVENOUS at 12:22

## 2018-04-02 RX ADMIN — SODIUM CHLORIDE, SODIUM LACTATE, POTASSIUM CHLORIDE, AND CALCIUM CHLORIDE 75 ML/HR: 600; 310; 30; 20 INJECTION, SOLUTION INTRAVENOUS at 12:05

## 2018-04-02 RX ADMIN — LIDOCAINE HYDROCHLORIDE 40 MG: 20 INJECTION, SOLUTION EPIDURAL; INFILTRATION; INTRACAUDAL; PERINEURAL at 12:22

## 2018-04-02 RX ADMIN — FAMOTIDINE 20 MG: 10 INJECTION INTRAVENOUS at 12:10

## 2018-04-02 RX ADMIN — PROPOFOL 50 MG: 10 INJECTION, EMULSION INTRAVENOUS at 12:23

## 2018-04-02 RX ADMIN — PROPOFOL 50 MG: 10 INJECTION, EMULSION INTRAVENOUS at 12:24

## 2018-04-02 NOTE — ANESTHESIA POSTPROCEDURE EVALUATION
Post-Anesthesia Evaluation and Assessment    Patient: Ximena Guzman MRN: [id number]  SSN: [id number]    YOB: 1965  Age: 47 y.o. Sex: female       Cardiovascular Function/Vital Signs  Visit Vitals    BP (!) 83/45    Pulse 65    Temp 36.4 °C (97.5 °F)    Resp 18    Ht 5' 5\" (1.651 m)    Wt 87.1 kg (192 lb)    SpO2 97%    Breastfeeding No    BMI 31.95 kg/m2       Patient is status post MAC anesthesia for Procedure(s):  UPPER ENDOSCOPY with biopsy. Nausea/Vomiting: None    Postoperative hydration reviewed and adequate. Pain:  Pain Scale 1: Numeric (0 - 10) (04/02/18 1235)  Pain Intensity 1: 0 (04/02/18 1235)   Managed    Neurological Status: At baseline    Mental Status and Level of Consciousness: Arousable    Pulmonary Status:   O2 Device: Room air (04/02/18 1237)   Adequate oxygenation and airway patent    Complications related to anesthesia: None    Post-anesthesia assessment completed.  No concerns    Signed By: Stephy Villalobos MD     April 2, 2018

## 2018-04-02 NOTE — IP AVS SNAPSHOT
[address] [facility] 
 
 
 [address] [address] [address] [address] [phone] Patient: Izabela Sherman MRN: [id number] :1965 About your hospitalization You were admitted on:  2018 You last received care in the:  [facility] You were discharged on:  2018 Why you were hospitalized Your primary diagnosis was:  Not on File Follow-up Information Follow up With Details Comments Contact Info Sima Gitelman, MD   [id number] Rosie Sanjeev [phone] Marguerite Hong MD Schedule an appointment as soon as possible for a visit in 6 week(s) call for appt in 6-8 weeks [address] [place] [address] [address] 200 [facility] 
[phone] Discharge Orders None A check heidi indicates which time of day the medication should be taken. My Medications CONTINUE taking these medications Instructions Each Dose to Equal  
 Morning Noon Evening Bedtime  
 azithromycin 250 mg tablet Commonly known as:  Thana Los Your last dose was: Your next dose is: Take two tablets today then one tablet daily Cholecalciferol (Vitamin D3) 2,000 unit Cap capsule Commonly known as:  VITAMIN D Your last dose was: Your next dose is: Take 2,000 Units by mouth daily. 2000 Units CLINDESSE 2 % SR vaginal cream  
Generic drug:  clindamycin Your last dose was: Your next dose is:    
   
   
      
   
   
   
  
 cloNIDine 0.1 mg/24 hr patch Commonly known as:  CATAPRES Your last dose was: Your next dose is:    
   
   
 APPLY 1 PATCH BY TRANSDERMAL ROUTE EVERY 7 DAYS. cpap machine kit Your last dose was: Your next dose is:    
   
   
 by Does Not Apply route.  Replace mask, headgear, heated hose, filters and accessories as needed for one year. CPAP pressure: 9 cwp Mask: standard Mirage FX plus Opus nasal pillows mask, or mask of choice Homecare Company: MED Download data 30 days after initiation of therapy  
     
   
   
   
  
 cyclobenzaprine 5 mg tablet Commonly known as:  FLEXERIL Your last dose was: Your next dose is: TAKE 1-2 TABLETS BY MOUTH AT NIGHT. diphenhydrAMINE 25 mg capsule Commonly known as:  BENADRYL Your last dose was: Your next dose is: Take 1 capsule by mouth nightly as needed. 25 mg  
    
   
   
   
  
 ibuprofen 800 mg tablet Commonly known as:  MOTRIN Your last dose was: Your next dose is: Take 1 Tab by mouth every eight (8) hours as needed for Pain. 800 mg  
    
   
   
   
  
 lansoprazole 30 mg capsule Commonly known as:  PREVACID Your last dose was: Your next dose is: TAKE 1 CAPSULE BY MOUTH DAILY (BEFORE BREAKFAST). levalbuterol tartrate 45 mcg/actuation inhaler Commonly known as:  Amnakristopher Celestinro Your last dose was: Your next dose is:    
   
   
 INHALE 1-2 PUFFS EVERY 4 TO 6 HOURS AS NEEDED  
     
   
   
   
  
 lisinopril-hydroCHLOROthiazide 20-12.5 mg per tablet Commonly known as:  Leonard Moraes Your last dose was: Your next dose is: TAKE 1 TABLET BY MOUTH ONCE DAILY  
     
   
   
   
  
 meclizine 25 mg tablet Commonly known as:  ANTIVERT Your last dose was: Your next dose is: TAKE 1 TAB BY MOUTH THREE (3) TIMES DAILY AS NEEDED FOR UP TO 10 DAYS. MILK THISTLE PO Your last dose was: Your next dose is: Take 1 Cap by mouth daily. 1 Cap OTHER Your last dose was: Your next dose is:    
   
   
 Tumeric 1 cap daily  OTHER  
   
 Your last dose was: Your next dose is:    
   
   
 alestrian hormone cream  
     
   
   
   
  
 PROBIOTIC PO Your last dose was: Your next dose is: Take  by mouth. raNITIdine 300 mg tablet Commonly known as:  ZANTAC Your last dose was: Your next dose is: Take 300 mg by mouth daily as needed. 300 mg  
    
   
   
   
  
 simvastatin 20 mg tablet Commonly known as:  ZOCOR Your last dose was: Your next dose is: TAKE 1 TABLET BY MOUTH AT BEDTIME  
     
   
   
   
  
 valACYclovir 500 mg tablet Commonly known as:  VALTREX Your last dose was: Your next dose is: Take 500 mg by mouth daily. 500 mg  
    
   
   
   
  
 vitamin e 400 unit Tab Your last dose was: Your next dose is: Take 1 Cap by mouth daily. 1 Cap YUVAFEM 10 mcg Tab vaginal tablet Generic drug:  estradiol Your last dose was: Your next dose is:    
   
   
 INSERT 1 APPLICATORFUL VAGINALLY AT BEDTIME FOR 14 DAYS THEN TWICE PER WEEK Discharge Instructions DISCHARGE SUMMARY from Nurse POST-PROCEDURE INSTRUCTIONS: 
 
Call your Physician if you: 
? Observe any excess bleeding. ? Develop a temperature over 100.5o F. 
? Experience abdominal, shoulder or chest pain. ? Notice any signs of decreased circulation or nerve impairment to an extremity such as a change in color, persistent numbness, tingling, coldness or increase in pain. ? Vomit blood or you have nausea and vomiting lasting longer than 4 hours. ? Are unable to take medications. ? Are unable to urinate within 8 hours after discharge following general anesthesia or intravenous sedation. For the next 24 hours after receiving general anesthesia or intravenous sedation, or while taking prescription Narcotics, limit your activities: ? Do NOT drive a motor vehicle, operate hazard machinery or power tools, or perform tasks that require coordination. The medication you received during your procedure may have some effect on your mental awareness. ? Do NOT make important personal or business decisions. The medication you received during your procedure may have some effect on your mental awareness. ? Do NOT drink alcoholic beverages. These drinks do not mix well with the medications that have been given to you. ? Upon discharge from the hospital, you must be accompanied by a responsible adult. ? Resume your diet as directed by your physician. ? Resume medications as your physician has prescribed. ? Please give a list of your current medications to your Primary Care Provider. ? Please update this list whenever your medications are discontinued, doses are changed, or new medications (including over-the-counter products) are added. ? Please carry medication information at all times in case of emergency situations. These are general instructions for a healthy lifestyle: No smoking/ No tobacco products/ Avoid exposure to second hand smoke. ? Surgeon General's Warning:  Quitting smoking now greatly reduces serious risk to your health. Obesity, smoking, and a sedentary lifestyle greatly increase your risk for illness. ? A healthy diet, regular physical exercise & weight monitoring are important for maintaining a healthy lifestyle ? You may be retaining fluid if you have a history of heart failure or if you experience any of the following symptoms:  Weight gain of 3 pounds or more overnight or 5 pounds in a week, increased swelling in our hands or feet or shortness of breath while lying flat in bed. Please call your doctor as soon as you notice any of these symptoms; do not wait until your next office visit. Recognize signs and symptoms of STROKE: 
F  -  Face looks uneven A  -  Arms unable to move or move unevenly S  -  Speech slurred or non-existent T  -  Time to call 911 - as soon as signs and symptoms begin - DO NOT go back to bed or wait to see If you get better - TIME IS BRAIN. Colorectal Screening ? Colorectal cancer almost always develops from precancerous polyps (abnormal growths) in the colon or rectum. Screening tests can find precancerous polyps, so that they can be removed before they turn into cancer. Screening tests can also find colorectal cancer early, when treatment works best. 
? Speak with your physician about when you should begin screening and how often you should be tested. [employer] Activation Thank you for requesting access to [facility]. Please follow the instructions below to securely access and download your online medical record. [facility] allows you to send messages to your doctor, view your test results, renew your prescriptions, schedule appointments, and more. How Do I Sign Up? 1. In your internet browser, go to [email or web address]. 2. Click on the First Time User? Click Here link in the Sign In box. You will see the New Member Sign Up page. 3. Enter your [facility] Access Code exactly as it appears below. You will not need to use this code after youve completed the sign-up process. If you do not sign up before the expiration date, you must request a new code. [facility] Access Code: [email or web address] Expires: 2018  2:39 PM (This is the date your [facility] access code will ) 4. Enter the last four digits of your Social Security Number (xxxx) and Date of Birth (mm/dd/yyyy) as indicated and click Submit. You will be taken to the next sign-up page. 5. Create a [facility] ID. This will be your [facility] login ID and cannot be changed, so think of one that is secure and easy to remember. 6. Create a [facility] password. You can change your password at any time. 7. Enter your Password Reset Question and Answer. This can be used at a later time if you forget your password. 8. Enter your e-mail address. You will receive e-mail notification when new information is available in [address] 9. Click Sign Up. You can now view and download portions of your medical record. 10. Click the Download Summary menu link to download a portable copy of your medical information. Additional Information If you have questions, please call [phone]. Remember, MyChart is NOT to be used for urgent needs. For medical emergencies, dial 911. Educational references and/or instructions provided during this visit included: 
 
Hiatal Hernia APPOINTMENTS: 
 
Please make a follow-up appointment with your physician. (6-8 weeks) Discharge information has been reviewed with the patient. The patient and daughter verbalized understanding. Upper GI Endoscopy: What to Expect at [facility] Your Recovery After you have an endoscopy, you will stay at the hospital or clinic for 1 to 2 hours. This will allow the medicine to wear off. You will be able to go home after your doctor or nurse checks to make sure you are not having any problems. You may have to stay overnight if you had treatment during the test. You may have a sore throat for a day or two after the test. 
This care sheet gives you a general idea about what to expect after the test. 
How can you care for yourself at home? Activity · Rest as much as you need to after you go home. · You should be able to go back to your usual activities the day after the test. 
Diet · Follow your doctor's directions for eating after the test. 
· Drink plenty of fluids (unless your doctor has told you not to). Medications · If you have a sore throat the day after the test, use an over-the-counter spray to numb your throat. Follow-up care is a key part of your treatment and safety.  Be sure to make and go to all appointments, and call your doctor if you are having problems. It's also a good idea to know your test results and keep a list of the medicines you take. When should you call for help? Call 911 anytime you think you may need emergency care. For example, call if: 
? · You passed out (loses consciousness). ? · You have trouble breathing. ? · You pass maroon or bloody stools. ?Call your doctor now or seek immediate medical care if: 
? · You have pain that does not get better after your take pain medicine. ? · You have new or worse belly pain. ? · You have blood in your stools. ? · You are sick to your stomach and cannot keep fluids down. ? · You have a fever. ? · You cannot pass stools or gas. ? Watch closely for changes in your health, and be sure to contact your doctor if: 
? · Your throat still hurts after a day or two. ? · You do not get better as expected. Where can you learn more? Go to http://libby-orlando.info/. Enter [phone] [phone] in the search box to learn more about \"Upper GI Endoscopy: What to Expect at Home. \" Current as of: May 12, 2017 Content Version: 11.4 © 4618-4264 [employer]ent[employer]. Care instructions adapted under license by [employer] (which disclaims liability or warranty for this information). If you have questions about a medical condition or this instruction, always ask your healthcare professional. [address] [address] any warranty or liability for your use of this information. Hiatal Hernia: Care Instructions Your Care Instructions A hiatal hernia occurs when part of the stomach bulges into the chest cavity. A hiatal hernia may allow stomach acid and juices to back up into the esophagus (acid reflux). This can cause a feeling of burning, warmth, heat, or pain behind the breastbone.  This feeling may often occur after you eat, soon after you lie down, or when you bend forward, and it may come and go. You also may have a sour taste in your mouth. These symptoms are commonly known as heartburn or reflux. But not all hiatal hernias cause symptoms. Follow-up care is a key part of your treatment and safety. Be sure to make and go to all appointments, and call your doctor if you are having problems. It's also a good idea to know your test results and keep a list of the medicines you take. How can you care for yourself at home? · Take your medicines exactly as prescribed. Call your doctor if you think you are having a problem with your medicine. · Do not take aspirin or other nonsteroidal anti-inflammatory drugs (NSAIDs), such as ibuprofen (Advil, Motrin) or naproxen (Aleve), unless your doctor says it is okay. Ask your doctor what you can take for pain. · Your doctor may recommend over-the-counter medicine. For mild or occasional indigestion, antacids such as Tums, Gaviscon, Maalox, or Mylanta may help. Your doctor also may recommend over-the-counter acid reducers, such as famotidine (Pepcid AC), cimetidine (Tagamet HB), ranitidine (Zantac 75 and Zantac 150), or omeprazole (Prilosec). Read and follow all instructions on the label. If you use these medicines often, talk with your doctor. · Change your eating habits. ¨ It's best to eat several small meals instead of two or three large meals. ¨ After you eat, wait 2 to 3 hours before you lie down. Late-night snacks aren't a good idea. ¨ Chocolate, mint, and alcohol can make heartburn worse. They relax the valve between the esophagus and the stomach. ¨ Spicy foods, foods that have a lot of acid (like tomatoes and oranges), and coffee can make heartburn symptoms worse in some people. If your symptoms are worse after you eat a certain food, you may want to stop eating that food to see if your symptoms get better.  
· Do not smoke or chew tobacco. 
· If you get heartburn at night, raise the head of your bed 6 to 8 inches by putting the frame on blocks or placing a foam wedge under the head of your mattress. (Adding extra pillows does not work.) · Do not wear tight clothing around your middle. · Lose weight if you need to. Losing just 5 to 10 pounds can help. When should you call for help? Call your doctor now or seek immediate medical care if: 
? · You have new or worse belly pain. ? · You are vomiting. ? Watch closely for changes in your health, and be sure to contact your doctor if: 
? · You have new or worse symptoms of indigestion. ? · You have trouble or pain swallowing. ? · You are losing weight. ? · You do not get better as expected. Where can you learn more? Go to http://libby-orlando.info/. Enter K055 in the search box to learn more about \"Hiatal Hernia: Care Instructions. \" Current as of: May 12, 2017 Content Version: 11.4 © 4583-[address]891 [address]. Care instructions adapted under license by [employer] (which disclaims liability or warranty for this information). If you have questions about a medical condition or this instruction, always ask your healthcare professional. [address] [address] any warranty or liability for your use of this information. Introducing [facility]! Yohan Hatr introduces HealthDataInsights patient portal. Now you can access parts of your medical record, email your doctor's office, and request medication refills online. 1. In your internet browser, go to [email or web address] Solutions 2. Click on the First Time User? Click Here link in the Sign In box. You will see the New Member Sign Up page. 3. Enter your [employer] Access Code exactly as it appears below. You will not need to use this code after youve completed the sign-up process. If you do not sign up before the expiration date, you must request a new code. · [employer] Access Code: [email or web address] Expires: 6/13/2018  2:39 PM 
 
 4. Enter the last four digits of your Social Security Number (xxxx) and Date of Birth (mm/dd/yyyy) as indicated and click Submit. You will be taken to the next sign-up page. 5. Create a SlideBatch ID. This will be your SlideBatch login ID and cannot be changed, so think of one that is secure and easy to remember. 6. Create a SlideBatch password. You can change your password at any time. 7. Enter your Password Reset Question and Answer. This can be used at a later time if you forget your password. 8. Enter your e-mail address. You will receive e-mail notification when new information is available in [address] 9. Click Sign Up. You can now view and download portions of your medical record. 10. Click the Download Summary menu link to download a portable copy of your medical information. If you have questions, please visit the Frequently Asked Questions section of the SlideBatch website. Remember, SlideBatch is NOT to be used for urgent needs. For medical emergencies, dial 911. Now available from your iPhone and Android! Introducing Miguel Freitas As a [facility] patient, I wanted to make you aware of our electronic visit tool called Miguel Garlandfin. [facility] 24/7 allows you to connect within minutes with a medical provider 24 hours a day, seven days a week via a mobile device or tablet or logging into a secure website from your computer. You can access Miguel Freitas from anywhere in the [place]. A virtual visit might be right for you when you have a simple condition and feel like you just dont want to get out of bed, or cant get away from work for an appointment, when your regular [facility]ud[facility] provider is not available (evenings, weekends or holidays), or when youre out of town and need minor care.   Electronic visits cost only $49 and if the Miguel Freitas provider determines a prescription is needed to treat your condition, one can be electronically transmitted to a nearby pharmacy*. Please take a moment to enroll today if you have not already done so. The enrollment process is free and takes just a few minutes. To enroll, please download the [facility] 24/7 ni to your tablet or phone, or visit [email or web address] to enroll on your computer. And, as an [address] [place] [address] patient with a [employer] account, the results of your visits will be scanned into your electronic medical record and your primary care provider will be able to view the scanned results. We urge you to continue to see your regular [facility] provider for your ongoing medical care. And while your primary care provider may not be the one available when you seek a [employer] virtual visit, the peace of mind you get from getting a real diagnosis real time can be priceless. For more information on [employer], view our Frequently Asked Questions (FAQs) at [email or web address]. Sincerely, 
 
Corby Stubbs MD 
Chief Medical Officer [address] [place] [address] *:  certain medications cannot be prescribed via [employer] Providers Seen During Your Hospitalization Provider Specialty Primary office phone My Singh MD Gastroenterology [phone] Your Primary Care Physician (PCP) Primary Care Physician Office Phone Office Fax [address] [place] [address] 76 [phone] You are allergic to the following Allergen Reactions Bactrim (Sulfamethoprim Ds) Other (comments) Made mouth feel thick, speech slurred Recent Documentation Height Weight Breastfeeding? BMI OB Status Smoking Status 1.651 m 87.1 kg No 31.95 kg/m2 Hysterectomy Never Smoker Emergency Contacts Name Discharge Info Relation Home Work Mobile [address] [facility] CAREGIVER [3] Child [2] [phone] Patient Belongings The following personal items are in your possession at time of discharge: 
  Dental Appliances: None  Visual Aid: Glasses, Other (comment) (With Daughter per patient.) Please provide this summary of care documentation to your next provider. Signatures-by signing, you are acknowledging that this After Visit Summary has been reviewed with you and you have received a copy. Patient Signature:  ____________________________________________________________ Date:  ____________________________________________________________  
  
[facility] Signature:  ____________________________________________________________ Date:  ____________________________________________________________

## 2018-04-02 NOTE — DISCHARGE INSTRUCTIONS
DISCHARGE SUMMARY from Nurse     POST-PROCEDURE INSTRUCTIONS:    Call your Physician if you:  ? Observe any excess bleeding. ? Develop a temperature over 100.5o F.  ? Experience abdominal, shoulder or chest pain. ? Notice any signs of decreased circulation or nerve impairment to an extremity such as a change in color, persistent numbness, tingling, coldness or increase in pain. ? Vomit blood or you have nausea and vomiting lasting longer than 4 hours. ? Are unable to take medications. ? Are unable to urinate within 8 hours after discharge following general anesthesia or intravenous sedation. For the next 24 hours after receiving general anesthesia or intravenous sedation, or while taking prescription Narcotics, limit your activities:  ? Do NOT drive a motor vehicle, operate hazard machinery or power tools, or perform tasks that require coordination. The medication you received during your procedure may have some effect on your mental awareness. ? Do NOT make important personal or business decisions. The medication you received during your procedure may have some effect on your mental awareness. ? Do NOT drink alcoholic beverages. These drinks do not mix well with the medications that have been given to you. ? Upon discharge from the hospital, you must be accompanied by a responsible adult. ? Resume your diet as directed by your physician. ? Resume medications as your physician has prescribed. ? Please give a list of your current medications to your Primary Care Provider. ? Please update this list whenever your medications are discontinued, doses are changed, or new medications (including over-the-counter products) are added. ? Please carry medication information at all times in case of emergency situations. These are general instructions for a healthy lifestyle:    No smoking/ No tobacco products/ Avoid exposure to second hand smoke.    Surgeon General's Warning: Quitting smoking now greatly reduces serious risk to your health. Obesity, smoking, and a sedentary lifestyle greatly increase your risk for illness.  A healthy diet, regular physical exercise & weight monitoring are important for maintaining a healthy lifestyle   You may be retaining fluid if you have a history of heart failure or if you experience any of the following symptoms:  Weight gain of 3 pounds or more overnight or 5 pounds in a week, increased swelling in our hands or feet or shortness of breath while lying flat in bed. Please call your doctor as soon as you notice any of these symptoms; do not wait until your next office visit. Recognize signs and symptoms of STROKE:  F  -  Face looks uneven  A  -  Arms unable to move or move unevenly  S  -  Speech slurred or non-existent  T  -  Time to call 911 - as soon as signs and symptoms begin - DO NOT go back to bed or wait to see If you get better - TIME IS BRAIN. Colorectal Screening   Colorectal cancer almost always develops from precancerous polyps (abnormal growths) in the colon or rectum. Screening tests can find precancerous polyps, so that they can be removed before they turn into cancer. Screening tests can also find colorectal cancer early, when treatment works best.  [address] [facility] Speak with your physician about when you should begin screening and how often you should be tested. iCrossing Activation    Thank you for requesting access to iCrossing. Please follow the instructions below to securely access and download your online medical record. iCrossing allows you to send messages to your doctor, view your test results, renew your prescriptions, schedule appointments, and more. How Do I Sign Up? 1. In your internet browser, go to [email or web address]. 2. Click on the First Time User? Click Here link in the Sign In box. You will see the New Member Sign Up page.   3. Enter your [employer] Access Code exactly as it appears below. You will not need to use this code after youve completed the sign-up process. If you do not sign up before the expiration date, you must request a new code. Medine Access Code: [email or web address]  Expires: 2018  2:39 PM (This is the date your Medine access code will )    4. Enter the last four digits of your Social Security Number (xxxx) and Date of Birth (mm/dd/yyyy) as indicated and click Submit. You will be taken to the next sign-up page. 5. Create a [employer] ID. This will be your Medine login ID and cannot be changed, so think of one that is secure and easy to remember. 6. Create a Medine password. You can change your password at any time. 7. Enter your Password Reset Question and Answer. This can be used at a later time if you forget your password. 8. Enter your e-mail address. You will receive e-mail notification when new information is available in [address]. 9. Click Sign Up. You can now view and download portions of your medical record. 10. Click the Download Summary menu link to download a portable copy of your medical information. Additional Information    If you have questions, please call [phone]. Remember, Medine is NOT to be used for urgent needs. For medical emergencies, dial 911. Educational references and/or instructions provided during this visit included:    Hiatal Hernia      APPOINTMENTS:    Please make a follow-up appointment with your physician. (6-8 weeks)    Discharge information has been reviewed with the patient. The patient and daughter verbalized understanding. Upper GI Endoscopy: What to Expect at [address] [place] [address]  After you have an endoscopy, you will stay at the hospital or clinic for 1 to 2 hours. This will allow the medicine to wear off. You will be able to go home after your doctor or nurse checks to make sure you are not having any problems.   You may have to stay overnight if you had treatment during the test. You may have a sore throat for a day or two after the test.  This care sheet gives you a general idea about what to expect after the test.  How can you care for yourself at home? Activity  · Rest as much as you need to after you go home. · You should be able to go back to your usual activities the day after the test.  Diet  · Follow your doctor's directions for eating after the test.  · Drink plenty of fluids (unless your doctor has told you not to). Medications  · If you have a sore throat the day after the test, use an over-the-counter spray to numb your throat. Follow-up care is a key part of your treatment and safety. Be sure to make and go to all appointments, and call your doctor if you are having problems. It's also a good idea to know your test results and keep a list of the medicines you take. When should you call for help? Call 911 anytime you think you may need emergency care. For example, call if:  ? · You passed out (loses consciousness). ? · You have trouble breathing. ? · You pass maroon or bloody stools. ?Call your doctor now or seek immediate medical care if:  ? · You have pain that does not get better after your take pain medicine. ? · You have new or worse belly pain. ? · You have blood in your stools. ? · You are sick to your stomach and cannot keep fluids down. ? · You have a fever. ? · You cannot pass stools or gas. ? Watch closely for changes in your health, and be sure to contact your doctor if:  ? · Your throat still hurts after a day or two. ? · You do not get better as expected. Where can you learn more? Go to http://libby-orlando.info/. Enter [phone] in the search box to learn more about \"Upper GI Endoscopy: What to Expect at Home. \"  Current as of: May 12, 2017  Content Version: 11.4  © 7903-1204 Healthwise, Incorporated.  Care instructions adapted under license by [employer] (which disclaims liability or warranty for this information). If you have questions about a medical condition or this instruction, always ask your healthcare professional. [address] any warranty or liability for your use of this information. Hiatal Hernia: Care Instructions  Your Care Instructions  A hiatal hernia occurs when part of the stomach bulges into the chest cavity. A hiatal hernia may allow stomach acid and juices to back up into the esophagus (acid reflux). This can cause a feeling of burning, warmth, heat, or pain behind the breastbone. This feeling may often occur after you eat, soon after you lie down, or when you bend forward, and it may come and go. You also may have a sour taste in your mouth. These symptoms are commonly known as heartburn or reflux. But not all hiatal hernias cause symptoms. Follow-up care is a key part of your treatment and safety. Be sure to make and go to all appointments, and call your doctor if you are having problems. It's also a good idea to know your test results and keep a list of the medicines you take. How can you care for yourself at home? · Take your medicines exactly as prescribed. Call your doctor if you think you are having a problem with your medicine. · Do not take aspirin or other nonsteroidal anti-inflammatory drugs (NSAIDs), such as ibuprofen (Advil, Motrin) or naproxen (Aleve), unless your doctor says it is okay. Ask your doctor what you can take for pain. · Your doctor may recommend over-the-counter medicine. For mild or occasional indigestion, antacids such as Tums, Gaviscon, Maalox, or Mylanta may help. Your doctor also may recommend over-the-counter acid reducers, such as famotidine (Pepcid AC), cimetidine (Tagamet HB), ranitidine (Zantac 75 and Zantac 150), or omeprazole (Prilosec). Read and follow all instructions on the label. If you use these medicines often, talk with your doctor. · Change your eating habits.   ¨ It's best to eat several small meals instead of two or three large meals. ¨ After you eat, wait 2 to 3 hours before you lie down. Late-night snacks aren't a good idea. ¨ Chocolate, mint, and alcohol can make heartburn worse. They relax the valve between the esophagus and the stomach. ¨ Spicy foods, foods that have a lot of acid (like tomatoes and oranges), and coffee can make heartburn symptoms worse in some people. If your symptoms are worse after you eat a certain food, you may want to stop eating that food to see if your symptoms get better. · Do not smoke or chew tobacco.  · If you get heartburn at night, raise the head of your bed 6 to 8 inches by putting the frame on blocks or placing a foam wedge under the head of your mattress. (Adding extra pillows does not work.)  · Do not wear tight clothing around your middle. · Lose weight if you need to. Losing just 5 to 10 pounds can help. When should you call for help? Call your doctor now or seek immediate medical care if:  ? · You have new or worse belly pain. ? · You are vomiting. ? Watch closely for changes in your health, and be sure to contact your doctor if:  ? · You have new or worse symptoms of indigestion. ? · You have trouble or pain swallowing. ? · You are losing weight. ? · You do not get better as expected. Where can you learn more? Go to http://libby-orlando.info/. Enter Q040 in the search box to learn more about \"Hiatal Hernia: Care Instructions. \"  Current as of: May 12, 2017  Content Version: 11.4  © 6956-3401 [employer]. Care instructions adapted under license by [employer] (which disclaims liability or warranty for this information). If you have questions about a medical condition or this instruction, always ask your healthcare professional. [address] any warranty or liability for your use of this information.

## 2018-04-02 NOTE — PROCEDURES
V[place]  [facility], [address]      Brief Procedure Note    Arnulfo Soulier  1965  [id number]    Date of Procedure: 4/2/2018    Preoperative diagnosis: 564.09 - K59.00,  Constipation, chronic  787.3 - R14.0,  Bloating symptom  571.8, Fatty liver nonalcoholic  400.8 - A95.6, Abnormal liver function tests  530.81 - K21.9,  Gastro-esophageal reflux disease without esophagitis  V16.0 - Z80.0, Family history of colon cancer  Z73.3,  Feeling stressed    Postoperative diagnosis:  grade 2 esophagitis mild gastritis, hiatal hernia    Type of Anesthesia: MAC (monitered anesthesia care)    Description of Findings: same as post op dx    Procedure: Procedure(s):  UPPER ENDOSCOPY with biopsy    :  Dr. Raymon Farias MD    Assistant(s): [unfilled]    Type of Anesthesia:MAC     EBL:None    Specimens:   ID Type Source Tests Collected by Time Destination   1 : antrum bx Preservative Gastric  Raymon Farias MD 4/2/2018 1225 Pathology       Findings: See printed and scanned procedure note    Complications: None    Dr. Raymon Farias MD  4/2/2018  12:26 PM

## 2018-04-02 NOTE — ANESTHESIA PREPROCEDURE EVALUATION
Anesthetic History   No history of anesthetic complications            Review of Systems / Medical History  Patient summary reviewed and pertinent labs reviewed    Pulmonary  Within defined limits      Sleep apnea: CPAP           Neuro/Psych   Within defined limits           Cardiovascular    Hypertension: well controlled              Exercise tolerance: >4 METS     GI/Hepatic/Renal  Within defined limits   GERD           Endo/Other        Obesity     Other Findings   Comments: Documentation of current medication  Current medications obtained, documented and obtained? YES      Risk Factors for Postoperative nausea/vomiting:       History of postoperative nausea/vomiting? NO       Female? YES       Motion sickness? NO       Intended opioid administration for postoperative analgesia? NO      Smoking Abstinence:  Current Smoker? NO  Elective Surgery? YES  Seen preoperatively by anesthesiologist or proxy prior to day of surgery? YES  Pt abstained from smoking 24 hours prior to anesthesia?  YES    Preventive care/screening for High Blood Pressure:  Aged 18 years and older: YES  Screened for high blood pressure: YES  Patients with high blood pressure referred to primary care provider   for BP management: YES                     Physical Exam    Airway  Mallampati: II  TM Distance: 4 - 6 cm  Neck ROM: normal range of motion   Mouth opening: Normal     Cardiovascular    Rhythm: regular  Rate: normal         Dental  No notable dental hx       Pulmonary  Breath sounds clear to auscultation               Abdominal  GI exam deferred       Other Findings            Anesthetic Plan    ASA: 3  Anesthesia type: MAC          Induction: Intravenous  Anesthetic plan and risks discussed with: Patient

## 2018-04-02 NOTE — H&P
[facility]    [email or web address]      Impression: 1. Reflux  2. Bloating      Plan:     1. EGD with MAC      Chief Complaint: Reflux      HPI:  Xavi Melton is a 47 y.o. female who is being seen preop for Reflux and bloating sx's    PMH:   Past Medical History:   Diagnosis Date    Cardiomegaly     Diverticulosis     GERD (gastroesophageal reflux disease)     H/O tubal ligation     H/O: hysterectomy     Hiatal hernia 2007    History of appendectomy     Hypercholesterolemia     Hypertension     Obesity, unspecified     SUBHASH on CPAP 4/8/2013    S/P mastectomy, bilateral     2010 : preventative    Undiagnosed cardiac murmurs     Unspecified adverse effect of anesthesia     was hypothermic in past       PSH:   Past Surgical History:   Procedure Laterality Date    HX BREAST RECONSTRUCTION  2011    nipple reconstruction 2011    HX HYSTERECTOMY  2003    HX MASTECTOMY  2010    bilateral    HX OOPHORECTOMY  2010       Social HX:   Social History     Social History    Marital status:      Spouse name: N/A    Number of children: N/A    Years of education: N/A     Occupational History    Not on file.      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 [employer]       [address]:   Family History   Problem Relation Age of Onset    Cancer Mother      breast    Cancer Sister      breast    Diabetes Maternal Grandmother     Hypertension Maternal Grandmother     Cancer Maternal Grandmother      ovarian    Heart Disease Maternal Grandfather 61     MI    Hypertension Maternal Aunt     Other Maternal Aunt      renal failure    Hypertension Maternal Uncle        Allergy:   Allergies   Allergen Reactions    Bactrim [Sulfamethoprim Ds] Other (comments)     Made mouth feel thick, speech slurred       Home Medications:     Prescriptions Prior to Admission   Medication Sig    levalbuterol tartrate (XOPENEX) 45 mcg/actuation inhaler INHALE 1-2 PUFFS EVERY 4 TO 6 HOURS AS NEEDED    azithromycin (ZITHROMAX) 250 mg tablet Take two tablets today then one tablet daily    ibuprofen (MOTRIN) 800 mg tablet Take 1 Tab by mouth every eight (8) hours as needed for Pain.  simvastatin (ZOCOR) 20 mg tablet TAKE 1 TABLET BY MOUTH AT BEDTIME    lisinopril-hydroCHLOROthiazide (PRINZIDE, ZESTORETIC) 20-12.5 mg per tablet TAKE 1 TABLET BY MOUTH ONCE DAILY    cloNIDine (CATAPRES) 0.1 mg/24 hr patch APPLY 1 PATCH BY TRANSDERMAL ROUTE EVERY 7 DAYS.  cyclobenzaprine (FLEXERIL) 5 mg tablet TAKE 1-2 TABLETS BY MOUTH AT NIGHT.  YUVAFEM 10 mcg tab vaginal tablet INSERT 1 APPLICATORFUL VAGINALLY AT BEDTIME FOR 14 DAYS THEN TWICE PER WEEK    lansoprazole (PREVACID) 30 mg capsule TAKE 1 CAPSULE BY MOUTH DAILY (BEFORE BREAKFAST).  meclizine (ANTIVERT) 25 mg tablet TAKE 1 TAB BY MOUTH THREE (3) TIMES DAILY AS NEEDED FOR UP TO 10 DAYS.  LACTOBACILLUS ACIDOPHILUS (PROBIOTIC PO) Take  by mouth.  OTHER alestrian hormone cream    OTHER Tumeric 1 cap daily    valACYclovir (VALTREX) 500 mg tablet Take 500 mg by mouth daily.  ranitidine (ZANTAC) 300 mg tablet Take 300 mg by mouth daily as needed.  CLINDESSE 2 % SR vaginal cream     MILK THISTLE PO Take 1 Cap by mouth daily.  vitamin e 400 unit tab Take 1 Cap by mouth daily.  Cholecalciferol, Vitamin D3, (VITAMIN D) 2,000 unit cap capsule Take 2,000 Units by mouth daily.  diphenhydrAMINE (BENADRYL) 25 mg capsule Take 1 capsule by mouth nightly as needed.  cpap machine kit by Does Not Apply route. Replace mask, headgear, heated hose, filters and accessories as needed for one year.    CPAP pressure: 9 cwp  Mask: standard Mirage FX plus Opus nasal pillows mask, or mask of choice  Homecare Company: MED  Download data 30 days after initiation of therapy       Review of Systems:     Constitutional: No fevers, chills, weight loss, fatigue. Cardiovascular: No chest pain, heart palpitations. Respiratory: No cough, SOB, wheezing, chest discomfort, orthopnea. Gastrointestinal: Reflux and bloating       Musculoskeletal: No weakness, arthralgias, wasting. Allergies: As noted. There were no vitals taken for this visit. Physical Assessment:     constitutional: appearance: well developed, well nourished, normal habitus, no deformities, in no acute distress. ENMT: mouth: normal oral mucosa,lips and gums; good dentition. oropharynx: normal tongue, hard and soft palate; posterior pharynx without erithema, exudate or lesions. respiratory: effort: normal chest excursion; no intercostal retraction or accessory muscle use. cardiovascular: abdominal aorta: normal size and position; no bruits. palpation: PMI of normal size and position; normal rhythm; no thrill or murmurs. abdominal: abdomen: normal consistency; no tenderness or masses. hernias: no hernias appreciated. liver: normal size and consistency. spleen: not palpable. rectal: hemoccult/guaiac: not performed. musculoskeletal: digits and nails: no clubbing, cyanosis, petechiae or other inflammatory conditions. psychiatric: orientation: oriented to time, space and person. Yue Johnson MD, M.D. [facility], [address] [place] [address]  Pager [phone]  [email or web address]

## 2018-04-03 ENCOUNTER — TELEPHONE (OUTPATIENT)
Dept: FAMILY MEDICINE CLINIC | Age: 53
End: 2018-04-03

## 2018-04-03 DIAGNOSIS — I10 ESSENTIAL HYPERTENSION: Primary | ICD-10-CM

## 2018-04-03 DIAGNOSIS — E78.00 PURE HYPERCHOLESTEROLEMIA: ICD-10-CM

## 2018-04-03 DIAGNOSIS — E55.9 VITAMIN D DEFICIENCY: ICD-10-CM

## 2018-04-09 NOTE — TELEPHONE ENCOUNTER
Tried to leave a message for patient to let her know lab work was ordered. Unable to leave a voicemail, because voicemail is full.

## 2018-04-09 NOTE — TELEPHONE ENCOUNTER
Tried to call patient, voice mail full unable to leave message to let patient know lab work has been ordered.

## 2018-04-19 LAB
25(OH)D3 SERPL-MCNC: 33 NG/ML (ref 30–100)
ALB/GLOBRATIO, 58C: 1.4 (CALC) (ref 1–2.5)
ALBUMIN SERPL-MCNC: 4.4 G/DL (ref 3.6–5.1)
ALP SERPL-CCNC: 85 U/L (ref 33–130)
ALT SERPL-CCNC: 77 U/L (ref 6–29)
AST SERPL W P-5'-P-CCNC: 73 U/L (ref 10–35)
BILIRUB SERPL-MCNC: 0.5 MG/DL (ref 0.2–1.2)
BUN SERPL-MCNC: 14 MG/DL (ref 7–25)
BUN/CREATININE RATIO,BUCR: 12 (CALC) (ref 6–22)
CALCIUM SERPL-MCNC: 10 MG/DL (ref 8.6–10.4)
CHLORIDE SERPL-SCNC: 108 MMOL/L (ref 98–110)
CHOL/HDL RATIO,CHHDX: 3.8 (CALC)
CHOLEST SERPL-MCNC: 191 MG/DL
CO2 SERPL-SCNC: 27 MMOL/L (ref 20–31)
CREAT SERPL-MCNC: 1.15 MG/DL (ref 0.5–1.05)
GLOBULIN,GLOB: 3.2 G/DL (CALC) (ref 1.9–3.7)
GLUCOSE SERPL-MCNC: 107 MG/DL (ref 65–99)
HDLC SERPL-MCNC: 50 MG/DL
LDL-CHOLESTEROL: 119 MG/DL (CALC)
NON-HDL CHOLESTEROL, 011976: 141 MG/DL (CALC)
POTASSIUM SERPL-SCNC: 4.3 MMOL/L (ref 3.5–5.3)
PROT SERPL-MCNC: 7.6 G/DL (ref 6.1–8.1)
SODIUM SERPL-SCNC: 142 MMOL/L (ref 135–146)
TRIGL SERPL-MCNC: 110 MG/DL (ref ?–150)

## 2018-04-23 ENCOUNTER — TELEPHONE (OUTPATIENT)
Dept: FAMILY MEDICINE CLINIC | Age: 53
End: 2018-04-23

## 2018-04-23 NOTE — TELEPHONE ENCOUNTER
Her BP needs to be checked and she needs to be evaluated if she is going to continue  On BP and cholesterol medications.  And her labs do show her liver tests are elevated so she should reschedule OV

## 2018-04-23 NOTE — TELEPHONE ENCOUNTER
Pt cancelled apt for 4/27. Requesting a nurse or Dr Jaclyn Valdez call her with her lab results so she doesn't have to be seen if nothing is wrong.

## 2018-04-24 NOTE — TELEPHONE ENCOUNTER
Patient is aware she needs to have BP checked and to be re-evaluated if she is going to continue to stay on cholesterol and BP medications. Also Liver enzymes are elevated and needs to be followed up. Patient verbalizes understanding.   Appointment scheduled for 5/4/2018 at 4:00 pm per patient request.

## 2018-04-27 ENCOUNTER — OFFICE VISIT (OUTPATIENT)
Dept: FAMILY MEDICINE CLINIC | Age: 53
End: 2018-04-27

## 2018-04-27 VITALS
RESPIRATION RATE: 17 BRPM | HEIGHT: 65 IN | BODY MASS INDEX: 32.26 KG/M2 | WEIGHT: 193.6 LBS | SYSTOLIC BLOOD PRESSURE: 118 MMHG | OXYGEN SATURATION: 95 % | HEART RATE: 70 BPM | DIASTOLIC BLOOD PRESSURE: 83 MMHG | TEMPERATURE: 98.8 F

## 2018-04-27 DIAGNOSIS — E78.00 PURE HYPERCHOLESTEROLEMIA: ICD-10-CM

## 2018-04-27 DIAGNOSIS — E55.9 VITAMIN D DEFICIENCY: ICD-10-CM

## 2018-04-27 DIAGNOSIS — I10 ESSENTIAL HYPERTENSION: Primary | ICD-10-CM

## 2018-04-27 DIAGNOSIS — Z11.59 NEED FOR HEPATITIS C SCREENING TEST: ICD-10-CM

## 2018-04-27 NOTE — PROGRESS NOTES
Subjective:       Chief Complaint  The patient presents for follow up of hypertension and high cholesterol. Vit D deficiency         HPI  Maricel Gilmore is a 47 y.o. female seen for follow up of hyperlipidemia. Sheraymundoo has hypertension. Hypertension well controlled, no significant medication side effects noted, on catapres which is also for hot flashes and zestoretic , hyperlipidemia borderline controlled on Zocor . LDL is up to 119, no significant medication side effects noted, on Zocor. LFTs are mildly elevated. Pt continues to struggle with with losing weight but is doing better with current diet. Diet and Lifestyle: generally follows a low fat low cholesterol diet, exercises sporadically    Home BP Monitoring: is not measured at home. Other symptoms and concerns: vit D level is well controlled on vit D 2000 units/day     Prediabetes  Fairly stable with diet     Current Outpatient Prescriptions   Medication Sig    simvastatin (ZOCOR) 20 mg tablet TAKE 1 TABLET BY MOUTH AT BEDTIME    lisinopril-hydroCHLOROthiazide (PRINZIDE, ZESTORETIC) 20-12.5 mg per tablet TAKE 1 TABLET BY MOUTH ONCE DAILY    cloNIDine (CATAPRES) 0.1 mg/24 hr patch APPLY 1 PATCH BY TRANSDERMAL ROUTE EVERY 7 DAYS.  cyclobenzaprine (FLEXERIL) 5 mg tablet TAKE 1-2 TABLETS BY MOUTH AT NIGHT.  LACTOBACILLUS ACIDOPHILUS (PROBIOTIC PO) Take  by mouth.  ranitidine (ZANTAC) 300 mg tablet Take 300 mg by mouth daily as needed.  vitamin e 400 unit tab Take 1 Cap by mouth daily.  Cholecalciferol, Vitamin D3, (VITAMIN D) 2,000 unit cap capsule Take 2,000 Units by mouth daily.  cpap machine kit by Does Not Apply route. Replace mask, headgear, heated hose, filters and accessories as needed for one year.    CPAP pressure: 9 cwp  Mask: standard Mirage FX plus Opus nasal pillows mask, or mask of choice  [employer]: MED  Download data 30 days after initiation of therapy    levalbuterol tartrate (XOPENEX) 45 mcg/actuation inhaler INHALE 1-2 PUFFS EVERY 4 TO 6 HOURS AS NEEDED    ibuprofen (MOTRIN) 800 mg tablet Take 1 Tab by mouth every eight (8) hours as needed for Pain.  YUVAFEM 10 mcg tab vaginal tablet INSERT 1 APPLICATORFUL VAGINALLY AT BEDTIME FOR 14 DAYS THEN TWICE PER WEEK    lansoprazole (PREVACID) 30 mg capsule TAKE 1 CAPSULE BY MOUTH DAILY (BEFORE BREAKFAST).  meclizine (ANTIVERT) 25 mg tablet TAKE 1 TAB BY MOUTH THREE (3) TIMES DAILY AS NEEDED FOR UP TO 10 DAYS.  OTHER alestrian hormone cream    OTHER Tumeric 1 cap daily    valACYclovir (VALTREX) 500 mg tablet Take 500 mg by mouth daily.  CLINDESSE 2 % SR vaginal cream     MILK THISTLE PO Take 1 Cap by mouth daily.  diphenhydrAMINE (BENADRYL) 25 mg capsule Take 1 capsule by mouth nightly as needed. No current facility-administered medications for this visit.               Review of Systems  Respiratory: negative for dyspnea on exertion  Cardiovascular: negative for chest pain    Objective:     Visit Vitals    /83 (BP 1 Location: Left arm, BP Patient Position: Sitting)    Pulse 70    Temp 98.8 °F (37.1 °C) (Oral)    Resp 17    Ht 5' 5\" (1.651 m)    Wt 193 lb 9.6 oz (87.8 kg)    SpO2 95%    BMI 32.22 kg/m2        General appearance - alert, well appearing, and in no distress  Chest - clear to auscultation, no wheezes, rales or rhonchi, symmetric air entry  Heart - normal rate, regular rhythm, normal S1, S2, no murmurs, rubs, clicks or gallops  Extremities - peripheral pulses normal, no pedal edema, no clubbing or cyanosis  Skin - normal coloration and turgor, no rashes, no suspicious skin lesions noted      Labs:   Lab Results   Component Value Date/Time    Cholesterol, total 191 04/18/2018 10:33 AM    HDL Cholesterol 50 (L) 04/18/2018 10:33 AM    LDL-CHOLESTEROL 119 (H) 04/18/2018 10:33 AM    LDL, calculated 101 (H) 08/12/2015 09:00 AM    LDL-C, External 96 04/23/2015    Triglyceride 110 04/18/2018 10:33 AM    Cholesterol/HDL ratio 3.8 04/18/2018 10:33 AM     Lab Results   Component Value Date/Time    Sodium 142 04/18/2018 10:33 AM    Potassium 4.3 04/18/2018 10:33 AM    Chloride 108 04/18/2018 10:33 AM    CO2 27 04/18/2018 10:33 AM    Anion gap 5 10/18/2016 05:28 PM    Glucose 107 (H) 04/18/2018 10:33 AM    BUN 14 04/18/2018 10:33 AM    Creatinine 1.15 (H) 04/18/2018 10:33 AM    BUN/Creatinine ratio 12 04/18/2018 10:33 AM    GFR est AA 63 04/18/2018 10:33 AM    GFR est non-AA 54 (L) 04/18/2018 10:33 AM    Calcium 10.0 04/18/2018 10:33 AM    Bilirubin, total 0.5 04/18/2018 10:33 AM    ALT (SGPT) 77 (H) 04/18/2018 10:33 AM    AST (SGOT) 73 (H) 04/18/2018 10:33 AM    Alk. phosphatase 85 04/18/2018 10:33 AM    Protein, total 7.6 04/18/2018 10:33 AM    Albumin 4.4 04/18/2018 10:33 AM    Globulin 3.2 04/18/2018 10:33 AM    A-G Ratio 1.1 10/18/2016 05:28 PM              Assessment / Plan     Hypertension well controlled, on current meds  Hyperlipidemia borderline controlled, on Zocor. Will try to improve it further with diet and weight loss. WIll continue to monitor LFTs      ICD-10-CM ICD-9-CM    1. Essential hypertension L29 405.5 METABOLIC PANEL, COMPREHENSIVE   2. Pure hypercholesterolemia E78.00 272.0 LIPID PANEL   3. 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 Follow-up Disposition:  Return in about 4 months (around 8/27/2018) for labs 1 week before. Reviewed plan of care. Patient has provided input and agrees with goals.

## 2018-04-27 NOTE — PATIENT INSTRUCTIONS

## 2018-04-27 NOTE — PROGRESS NOTES
Patient is here for a follow up. 1. Have you been to the ER, urgent care clinic since your last visit? Hospitalized since your last visit? No    2. Have you seen or consulted any other health care providers outside of the [address] [place] [address] since your last visit? Include any pap smears or colon screening. Yes.  Gastroenterologist and neurologist.

## 2018-04-27 NOTE — MR AVS SNAPSHOT
[address] [facility] 
 
 
 [address] [address] [address] [facility] [address] 
[phone] Patient: Tamika Cabrera MRN: [id number] :1965 Visit Information Date & Time Provider Department Dept. Phone Encounter #  
 2018  1:00 PM Adri Giraldo, [address] [phone] [id number] Follow-up Instructions Return in about 4 months (around 2018) for labs 1 week before. Your Appointments 2018  4:00 PM  
Office Visit with Adri Giraldo MD  
[address] [facility]) Appt Note: follow up BP and chol [facility] [address] [facility] [address]  
[phone]  
  
   
 [address],  [address] Upcoming Health Maintenance Date Due Hepatitis C Screening 1965 PAP AKA CERVICAL CYTOLOGY 2014 BREAST CANCER SCRN MAMMOGRAM 2016 DTaP/Tdap/Td series (2 - Td) 2017 Influenza Age 5 to Adult 2018 COLONOSCOPY 2020 Allergies as of 2018  Review Complete On: 2018 By: Adri Giraldo MD  
  
 Severity Noted Reaction Type Reactions Bactrim [Sulfamethoprim Ds]  12/15/2011    Other (comments) Made mouth feel thick, speech slurred Current Immunizations  Reviewed on 2015 Name Date Influenza Vaccine 10/15/2015 Influenza Vaccine Nasal 2012 Influenza Vaccine Whole 2011 TDAP Vaccine 2007 Not reviewed this visit You Were Diagnosed With   
  
 Codes Comments Essential hypertension    -  Primary ICD-10-CM: I10 
ICD-9-CM: 401.9 Pure hypercholesterolemia     ICD-10-CM: E78.00 ICD-9-CM: 272.0 Vitamin D deficiency     ICD-10-CM: E55.9 ICD-9-CM: 268.9 Vitals BP Pulse Temp Resp Height(growth percentile) Weight(growth percentile) 118/83 (BP 1 Location: Left arm, BP Patient Position: Sitting) 70 98.8 °F (37.1 °C) (Oral) 17 5' 5\" (1.651 m) 193 lb 9.6 oz (87.8 kg) SpO2 BMI OB Status Smoking Status 95% 32.22 kg/m2 Hysterectomy Never Smoker BMI and BSA Data Body Mass Index Body Surface Area  
 32.22 kg/m 2 2.01 m 2 Preferred Pharmacy Pharmacy Name Phone [facility][id number] Lachelle Serna, [address] [facility] [facility] [phone] Your Updated Medication List  
  
   
This list is accurate as of 4/27/18  1:26 PM.  Always use your most recent med list.  
  
  
  
  
 Cholecalciferol (Vitamin D3) 2,000 unit Cap capsule Commonly known as:  VITAMIN D Take 2,000 Units by mouth daily. CLINDESSE 2 % SR vaginal cream  
Generic drug:  clindamycin  
  
 cloNIDine 0.1 mg/24 hr patch Commonly known as:  CATAPRES  
APPLY 1 PATCH BY TRANSDERMAL ROUTE EVERY 7 DAYS. cpap machine kit  
by Does Not Apply route. Replace mask, headgear, heated hose, filters and accessories as needed for one year. CPAP pressure: 9 cwp Mask: standard Mirage FX plus Opus nasal pillows mask, or mask of choice Homecare Company: MED Download data 30 days after initiation of therapy  
  
 cyclobenzaprine 5 mg tablet Commonly known as:  FLEXERIL  
TAKE 1-2 TABLETS BY MOUTH AT NIGHT. diphenhydrAMINE 25 mg capsule Commonly known as:  BENADRYL Take 1 capsule by mouth nightly as needed. ibuprofen 800 mg tablet Commonly known as:  MOTRIN Take 1 Tab by mouth every eight (8) hours as needed for Pain.  
  
 lansoprazole 30 mg capsule Commonly known as:  PREVACID TAKE 1 CAPSULE BY MOUTH DAILY (BEFORE BREAKFAST). levalbuterol tartrate 45 mcg/actuation inhaler Commonly known as:  XOPENEX  
INHALE 1-2 PUFFS EVERY 4 TO 6 HOURS AS NEEDED  
  
 lisinopril-hydroCHLOROthiazide 20-12.5 mg per tablet Commonly known as:  PRINZIDE, ZESTORETIC  
TAKE 1 TABLET BY MOUTH ONCE DAILY  
  
 meclizine 25 mg tablet Commonly known as:  ANTIVERT  
TAKE 1 TAB BY MOUTH THREE (3) TIMES DAILY AS NEEDED FOR UP TO 10 DAYS.  MILK THISTLE PO  
 Take 1 Cap by mouth daily. OTHER Tumeric 1 cap daily OTHER  
alestrian hormone cream  
  
 PROBIOTIC PO Take  by mouth. raNITIdine 300 mg tablet Commonly known as:  ZANTAC Take 300 mg by mouth daily as needed. simvastatin 20 mg tablet Commonly known as:  ZOCOR  
TAKE 1 TABLET BY MOUTH AT BEDTIME  
  
 valACYclovir 500 mg tablet Commonly known as:  VALTREX Take 500 mg by mouth daily. vitamin e 400 unit Tab Take 1 Cap by mouth daily. YUVAFEM 10 mcg Tab vaginal tablet Generic drug:  estradiol INSERT 1 APPLICATORFUL VAGINALLY AT BEDTIME FOR 14 DAYS THEN TWICE PER WEEK Follow-up Instructions Return in about 4 months (around 8/27/2018) for labs 1 week before. To-Do List   
 05/04/2018 10:00 AM  
  Appointment with [facility] RM 1 at [address] [place] [address] ([phone]) GENERAL INSTRUCTIONS  Bring information (ID card) if you have any medically implanted devices. You will be required to lie still while the procedure is being performed. Remove any jewelry (including body piercing, hairpins) prior to MRI. If you have had a creatinine level drawn within the past 30 days, please bring most recent results to your appt. Bring any films, CD's, and reports related to your study with you on the day of your exam.  This only includes studies done outside of [address] [place] [address], [place] [address], [facility], and [facility]. Bring a complete list of all medications you are currently taking to include prescriptions, over-the-counter meds, herbals, vitamins & any dietary supplements. If you were given medications for claustrophobia or anxiety, please arrange to have someone drive you to your appointment. QUESTIONS  Notify the MRI Department if you have any questions concerning your study. [facility] - [phone] [facility] - [address] [place] [address] - [phone] Patient Instructions Heart-Healthy Diet: Care Instructions Your Care Instructions A heart-healthy diet has lots of vegetables, fruits, nuts, beans, and whole grains, and is low in salt. It limits foods that are high in saturated fat, such as meats, cheeses, and fried foods. It may be hard to change your diet, but even small changes can lower your risk of heart attack and heart disease. Follow-up care is a key part of your treatment and safety. Be sure to make and go to all appointments, and call your doctor if you are having problems. It's also a good idea to know your test results and keep a list of the medicines you take. How can you care for yourself at home? Watch your portions · Learn what a serving is. A \"serving\" and a \"portion\" are not always the same thing. Make sure that you are not eating larger portions than are recommended. For example, a serving of pasta is ½ cup. A serving size of meat is 2 to 3 ounces. A 3-ounce serving is about the size of a deck of cards. Measure serving sizes until you are good at [place]" them. Keep in mind that restaurants often serve portions that are 2 or 3 times the size of one serving. · To keep your energy level up and keep you from feeling hungry, eat often but in smaller portions. · Eat only the number of calories you need to stay at a healthy weight. If you need to lose weight, eat fewer calories than your body burns (through exercise and other physical activity). Eat more fruits and vegetables · Eat a variety of fruit and vegetables every day. Dark green, deep orange, red, or yellow fruits and vegetables are especially good for you. Examples include spinach, carrots, peaches, and berries. · Keep carrots, celery, and other veggies handy for snacks. Buy fruit that is in season and store it where you can see it so that you will be tempted to eat it. · Cook dishes that have a lot of veggies in them, such as stir-fries and soups. Limit saturated and trans fat · Read food labels, and try to avoid saturated and trans fats. They increase your risk of heart disease. Trans fat is found in many processed foods such as cookies and crackers. · Use olive or canola oil when you cook. Try cholesterol-lowering spreads, such as Benecol or Take Control. · Bake, broil, grill, or steam foods instead of frying them. · Choose lean meats instead of high-fat meats such as hot dogs and sausages. Cut off all visible fat when you prepare meat. · Eat fish, skinless poultry, and meat alternatives such as soy products instead of high-fat meats. Soy products, such as tofu, may be especially good for your heart. · Choose low-fat or fat-free milk and dairy products. Eat fish · Eat at least two servings of fish a week. Certain fish, such as salmon and tuna, contain omega-3 fatty acids, which may help reduce your risk of heart attack. Eat foods high in fiber · Eat a variety of grain products every day. Include whole-grain foods that have lots of fiber and nutrients. Examples of whole-grain foods include oats, whole wheat bread, and brown rice. · Buy whole-grain breads and cereals, instead of white bread or pastries. Limit salt and sodium · Limit how much salt and sodium you eat to help lower your blood pressure. · Taste food before you salt it. Add only a little salt when you think you need it. With time, your taste buds will adjust to less salt. · Eat fewer snack items, fast foods, and other high-salt, processed foods. Check food labels for the amount of sodium in packaged foods. · Choose low-sodium versions of canned goods (such as soups, vegetables, and beans). Limit sugar · Limit drinks and foods with added sugar. These include candy, desserts, and soda pop. Limit alcohol · Limit alcohol to no more than 2 drinks a day for men and 1 drink a day for women. Too much alcohol can cause health problems. When should you call for help? Watch closely for changes in your health, and be sure to contact your doctor if: 
? · You would like help planning heart-healthy meals. Where can you learn more? Go to http://libby-orlando.info/. Enter V137 in the search box to learn more about \"Heart-Healthy Diet: Care Instructions. \" Current as of: September 21, 2016 Content Version: 11.4 © 4108-1286 [employer]. Care instructions adapted under license by [employer] (which disclaims liability or warranty for this information). If you have questions about a medical condition or this instruction, always ask your healthcare professional. [address] any warranty or liability for your use of this information. Introducing [facility]! Dear Jared Underwood: Thank you for requesting a [employer] account. Our records indicate that you already have an active [employer] account. You can access your account anytime at [email or web address] Did you know that you can access your hospital and ER discharge instructions at any time in [employer]? You can also review all of your test results from your hospital stay or ER visit. Additional Information If you have questions, please visit the Frequently Asked Questions section of the [employer] website at [email or web address] Remember, [employer] is NOT to be used for urgent needs. For medical emergencies, dial 911. Now available from your iPhone and Android! Please provide this summary of care documentation to your next provider. Your primary care clinician is listed as ZAKIYA BEGUM. If you have any questions after today's visit, please call [phone].

## 2018-05-04 ENCOUNTER — HOSPITAL ENCOUNTER (OUTPATIENT)
Age: 53
Discharge: HOME | End: 2018-05-04
Attending: PSYCHIATRY & NEUROLOGY
Payer: OTHER GOVERNMENT

## 2018-05-04 DIAGNOSIS — M54.12 CERVICAL RADICULOPATHY: ICD-10-CM

## 2018-05-04 PROCEDURE — 72141 MRI NECK SPINE W/O DYE: CPT

## 2018-05-14 RX ORDER — LISINOPRIL AND HYDROCHLOROTHIAZIDE 12.5; 2 MG/1; MG/1
TABLET ORAL
Qty: 90 TAB | Refills: 2 | Status: SHIPPED | OUTPATIENT
Start: 2018-05-14 | End: 2019-02-18 | Stop reason: SDUPTHER

## 2018-05-29 ENCOUNTER — HOSPITAL ENCOUNTER (OUTPATIENT)
Dept: PHYSICAL THERAPY | Age: 53
Discharge: HOME | End: 2018-05-29
Payer: OTHER GOVERNMENT

## 2018-05-29 PROCEDURE — 97162 PT EVAL MOD COMPLEX 30 MIN: CPT

## 2018-05-29 PROCEDURE — 97110 THERAPEUTIC EXERCISES: CPT

## 2018-05-29 NOTE — PROGRESS NOTES
PT DAILY TREATMENT NOTE     Patient Name: Saad Mccoy  Date:2018  : 1965  [x]  Patient  Verified  Payor: Mady Nur / Plan: Geraldo Perez / Product Type: Commerical /    In time: 7:40  Out time: 8:30  Total Treatment Time (min): 50  Visit #: 4 of 8    Treatment Area: Right shoulder pain [M25.511]    SUBJECTIVE  Pain Level (0-10 scale): 0/10  Any medication changes, allergies to medications, adverse drug reactions, diagnosis change, or new procedure performed?: [x] No    [] Yes (see summary sheet for update)  Subjective functional status/changes:   [] No changes reported  The patient states that her chief complaint is neck and right shoulder pain with right arm tingling. OBJECTIVE  38 min [x]Eval                  []Re-Eval     12 min Therapeutic Exercise:  [x] See flow sheet :   Rationale: increase ROM and increase strength to improve the patients ability to improve ADL ease. With   [] TE   [] TA   [] neuro   [] other: Patient Education: [x] Review HEP    [] Progressed/Changed HEP based on:   [] positioning   [] body mechanics   [] transfers   [] heat/ice application    [] other:      Other Objective/Functional Measures: See IE     Pain Level (0-10 scale) post treatment: 5/10    ASSESSMENT/Changes in Function: See POC. Patient will continue to benefit from skilled PT services to modify and progress therapeutic interventions, address functional mobility deficits, address ROM deficits, address strength deficits, analyze and address soft tissue restrictions, analyze and cue movement patterns, analyze and modify body mechanics/ergonomics, assess and modify postural abnormalities and instruct in home and community integration to attain remaining goals. [x]  See Plan of Care  []  See progress note/recertification  []  See Discharge Summary         Progress towards goals / Updated goals:  Short Term Goals:  To be accomplished in 2 weeks:                         1. The patient will display independence and compliance with HEP to maximize therapeutic benefit. 2. The patient will improve cervical rotation to 60 degrees to improve ease of driving. Long Term Goals: To be accomplished in 4 weeks:                         1. The patient will improve FOTO score to 56 to maximize quality of life. 2. The patient will report 75% resolution of right UE paresthesias to improve ease of ADLs. 3. The patient will demonstrate mid trap strength to 4/5 MMT to maximize stability during [place] reaching/lifting. 4. The patient will improve cervical sidebending to 30 degrees to improve ease of dressing. and performing ADLs.     PLAN  [x]  Upgrade activities as tolerated    Nemo Laura, PT 5/29/2018  9:07 AM    Future Appointments  Date Time Provider Chay Meng   8/24/2018 7:10 AM [facility] NURSE [facility] DAMON FITZGERALD   8/31/2018 9:00 AM Jessica Cartwright MD [facility]

## 2018-05-29 NOTE — PROGRESS NOTES
[facility]  [address]  [place], [address]  [phone] fax    Plan of Care/ Statement of Necessity for Physical Therapy Services    Patient name: Jackie Miranda Start of Care: 2018   Referral source: Bill Mcburney, MD : 1965    Medical Diagnosis: Right shoulder pain [M25.511]   Onset Date: Most recently about 1 month ago    Treatment Diagnosis: Mechanical cervical pain   Prior Hospitalization: see medical history Provider#: [id number]   Medications: Verified on Patient summary List    Comorbidities: HTN, GI disease, BMI > 30   Prior Level of Function: The patient states that she was working void of lifting restrictions prior to recent exacerbation. The Plan of Care and following information is based on the information from the initial evaluation. Assessment/ key information: The patient is a 47year old female with a chief complaint of neck, right shoulder and tingling that extends into her entire right arm, especially her 4th and 5th digits. She states that resting her medial arm on a chair arm tends to increase her numbness. She does report that it feels better when she is walking. Of note, she did have an onset of B LE numbness through the lateral aspect of her legs within the last 72 hours. Advised patient if this persists she needs to follow back up with MD due to new onset of symptoms. The patient did have a recent MRI of her cervical spine taken on 2018, showing \"Multilevel degenerative disc disease most significant at C6/C7 and C5/C6 causing significant spinal stenosis at each of these levels. Deformity of the cord but no definite abnormal signal of the cord. Multiple levels foraminal narrowing most marked bilaterally at C5/C6 and on the left C6/C7. \"     Evaluation Complexity History MEDIUM  Complexity : 1-2 comorbidities / personal factors will impact the outcome/ POC ; Examination MEDIUM Complexity : 3 Standardized tests and measures addressing body structure, function, activity limitation and / or participation in recreation  ;Presentation MEDIUM Complexity : Evolving with changing characteristics  ; Clinical Decision Making MEDIUM Complexity : FOTO score of 26-74  Overall Complexity Rating: MEDIUM  Problem List: pain affecting function, decrease ROM, decrease strength, decrease ADL/ functional abilitiies, decrease activity tolerance and decrease flexibility/ joint mobility   Treatment Plan may include any combination of the following: Therapeutic exercise, Therapeutic activities, Neuromuscular re-education, Physical agent/modality, Manual therapy, Patient education and Functional mobility training  Patient / Family readiness to learn indicated by: asking questions, trying to perform skills and interest  Persons(s) to be included in education: patient (P)  Barriers to Learning/Limitations: None  Patient Goal (s): Resolve issue of bulging discs in C5, C6, and 7.  Patient Self Reported Health Status: good  Rehabilitation Potential: good    Short Term Goals: To be accomplished in 2 weeks:   1. The patient will display independence and compliance with HEP to maximize therapeutic benefit. 2. The patient will improve cervical rotation to 60 degrees to improve ease of driving. Long Term Goals: To be accomplished in 4 weeks:   1. The patient will improve FOTO score to 56 to maximize quality of life. 2. The patient will report 75% resolution of right UE paresthesias to improve ease of ADLs. 3. The patient will demonstrate mid trap strength to 4/5 MMT to maximize stability during [place] reaching/lifting. 4. The patient will improve cervical sidebending to 30 degrees to improve ease of dressing. and performing ADLs. Frequency / Duration: Patient to be seen 2 times per week for 4 weeks.     Patient/ Caregiver education and instruction: Diagnosis, prognosis, self care, activity modification and exercises   [x]  Plan of care has been reviewed with ANAYELI De Souza, PT 5/29/2018 8:27 AM    ________________________________________________________________________    I certify that the above Therapy Services are being furnished while the patient is under my care. I agree with the treatment plan and certify that this therapy is necessary.     [de-identified] Signature:____________________  Date:____________Time: _________    Please sign and return to [facility]  [address]  [place], [address]  [phone] fax

## 2018-06-08 ENCOUNTER — CLINICAL SUPPORT (OUTPATIENT)
Dept: FAMILY MEDICINE CLINIC | Age: 53
End: 2018-06-08

## 2018-06-08 DIAGNOSIS — Z11.1 SCREENING EXAMINATION FOR PULMONARY TUBERCULOSIS: Primary | ICD-10-CM

## 2018-06-08 NOTE — PROGRESS NOTES
Patient presented to office for PPD Placement. Medications and allergies reviewed with patient. PPD given in Left forearm at 8:30am.  Patient tolerated procedure well. Patient will return to office in 2 -3 days for PPD reading. Patient verbalizes understanding.   Lot # [id number]  Exp 11/01/2019  [address] [phone]  [facility]

## 2018-06-08 NOTE — PATIENT INSTRUCTIONS
Tuberculin Skin Test: Care Instructions  Your Care Instructions    Tuberculosis (TB) is a bacterial infection that can damage the lungs or other parts of the body. The TB skin test can tell if you have TB bacteria in your body. Many people are exposed to TB and test positive for TB bacteria in their bodies, but they don't get the disease. TB bacteria can stay in your body without making you sick. This is because your immune system can keep TB in check. Your doctor may want you to have a TB skin test if you have been in close contact with someone who has TB. Or you may need the test if you have symptoms that might be caused by TB, such as a cough that does not go away, a fever, or weight loss. You also may get the test if you are a health care worker. During the skin test, part of a TB bacterium is injected under your skin. The test will feel like a skin prick. If you have TB bacteria in your body, a firm red bump will form at the shot site within 2 days. If the test shows that you are infected with TB (positive), your doctor probably will order more tests. A TB-positive skin test can't tell when you became infected with TB. And it can't tell whether the infection can be passed to others. Follow-up care is a key part of your treatment and safety. Be sure to make and go to all appointments, and call your doctor if you are having problems. It's also a good idea to know your test results and keep a list of the medicines you take. How can you care for yourself at home? · Do not scratch the test site. Scratching it may cause redness or swelling. This could affect the test results. · To ease itching, put a cold washcloth on the site. Then pat the site dry. · Do not cover the test site with a bandage or other dressing. · Go back to your doctor's office or hospital to have the test read on the follow-up date. This must be done between 48 and 72 hours after you get the shot. When should you call for help?   Watch closely for changes in your health, and be sure to contact your doctor if you have any problems. Where can you learn more? Go to http://libby-orlando.info/. Enter (25) 1878-4819 in the search box to learn more about \"Tuberculin Skin Test: Care Instructions. \"  Current as of: March 3, 2017  Content Version: 11.4  © 6415-0922 Fan[employer]uel. Care instructions adapted under license by [employer]yer[employer] (which disclaims liability or warranty for this information). If you have questions about a medical condition or this instruction, always ask your healthcare professional. [place] [address] any warranty or liability for your use of this information.

## 2018-06-08 NOTE — MR AVS SNAPSHOT
[facility] 
 
 
 [address], [place] [address] [address] [address] 
[phone] Patient: Roxann Busby MRN: [id number] :1965 Visit Information Date & Time Provider Department Dept. Phone Encounter #  
 2018  8:00 AM Radha Agarwal [address] [place] [address] [facility] [id number] Follow-up Instructions Return in about 3 days (around 2018). Follow-up and Disposition History Your Appointments 2018  7:10 AM  
LAB with [facility] (DAMON Bergeron) Appt Note: 4 mos labs 129 [facility] [address] [address]  
[phone]  
  
   
 [address], [facility]  
  
    
 2018  9:00 AM  
Office Visit with Radha Agarwal MD  
[address] [facility] Appt Note: 4 mos fu per [facility]  
 [address], [facility] [address] [address]  
[phone]  
  
   
 [address],  [address] [facility] Upcoming Health Maintenance Date Due Hepatitis C Screening 1965 PAP AKA CERVICAL CYTOLOGY 2014 DTaP/Tdap/Td series (2 - Td) 2017 Influenza Age 5 to Adult 2018 COLONOSCOPY 2020 Allergies as of 2018  Review Complete On: 2018 By: Aliza Bishop, PT Severity Noted Reaction Type Reactions Bactrim [Sulfamethoprim Ds]  12/15/2011    Other (comments) Made mouth feel thick, speech slurred Current Immunizations  Reviewed on 2018 Name Date Influenza Vaccine 10/15/2015, 10/1/2009 12:00 AM  
 Influenza Vaccine Nasal 2012 Influenza Vaccine Whole 2011 Novel Influenza-H1N1-09, All Formulations 10/1/2009 12:00 AM  
 TB Skin Test (PPD) Intradermal 2018  8:51 AM  
 TDAP Vaccine 2007 Reviewed by Moni Mcbride LPN on 8516 at  0:81 AM  
 Reviewed by Moni Mcbride LPN on 3247 at  9:89 AM  
You Were Diagnosed With   
  
 Codes Comments Screening examination for pulmonary tuberculosis    -  Primary ICD-10-CM: Z11.1 ICD-9-CM: V74.1 Vitals OB Status Smoking Status Hysterectomy Never Smoker Preferred Pharmacy Pharmacy Name Phone [id number] [place], [address] [facility] [facility] [phone] Your Updated Medication List  
  
   
This list is accurate as of 6/8/18  8:52 AM.  Always use your most recent med list.  
  
  
  
  
 Cholecalciferol (Vitamin D3) 2,000 unit Cap capsule Commonly known as:  VITAMIN D Take 2,000 Units by mouth daily. CLINDESSE 2 % SR vaginal cream  
Generic drug:  clindamycin  
  
 cloNIDine 0.1 mg/24 hr patch Commonly known as:  CATAPRES  
APPLY 1 PATCH BY TRANSDERMAL ROUTE EVERY 7 DAYS. cpap machine kit  
by Does Not Apply route. Replace mask, headgear, heated hose, filters and accessories as needed for one year. CPAP pressure: 9 cwp Mask: standard Mirage FX plus Opus nasal pillows mask, or mask of choice Homecare Company: MED Download data 30 days after initiation of therapy  
  
 cyclobenzaprine 5 mg tablet Commonly known as:  FLEXERIL  
TAKE 1-2 TABLETS BY MOUTH AT NIGHT. diphenhydrAMINE 25 mg capsule Commonly known as:  BENADRYL Take 1 capsule by mouth nightly as needed. ibuprofen 800 mg tablet Commonly known as:  MOTRIN Take 1 Tab by mouth every eight (8) hours as needed for Pain.  
  
 lansoprazole 30 mg capsule Commonly known as:  PREVACID TAKE 1 CAPSULE BY MOUTH DAILY (BEFORE BREAKFAST). levalbuterol tartrate 45 mcg/actuation inhaler Commonly known as:  XOPENEX  
INHALE 1-2 PUFFS EVERY 4 TO 6 HOURS AS NEEDED  
  
 lisinopril-hydroCHLOROthiazide 20-12.5 mg per tablet Commonly known as:  PRINZIDE, ZESTORETIC  
TAKE 1 TABLET BY MOUTH ONCE DAILY  
  
 meclizine 25 mg tablet Commonly known as:  ANTIVERT  
TAKE 1 TAB BY MOUTH THREE (3) TIMES DAILY AS NEEDED FOR UP TO 10 DAYS.  MILK THISTLE PO  
 Take 1 Cap by mouth daily. OTHER Tumeric 1 cap daily OTHER  
alestrian hormone cream  
  
 PROBIOTIC PO Take  by mouth. raNITIdine 300 mg tablet Commonly known as:  ZANTAC Take 300 mg by mouth daily as needed. simvastatin 20 mg tablet Commonly known as:  ZOCOR  
TAKE 1 TABLET BY MOUTH AT BEDTIME  
  
 valACYclovir 500 mg tablet Commonly known as:  VALTREX Take 500 mg by mouth daily. vitamin e 400 unit Tab Take 1 Cap by mouth daily. YUVAFEM 10 mcg Tab vaginal tablet Generic drug:  estradiol INSERT 1 APPLICATORFUL VAGINALLY AT BEDTIME FOR 14 DAYS THEN TWICE PER WEEK We Performed the Following AMB POC TUBERCULOSIS, INTRADERMAL (SKIN TEST) [55274 CPT(R)] Follow-up Instructions Return in about 3 days (around 6/11/2018). To-Do List   
 06/12/2018 8:30 AM  
  Appointment with Karlene Lopez PT at [facility]  [facility] ([phone]) Patient Instructions Tuberculin Skin Test: Care Instructions Your Care Instructions Tuberculosis (TB) is a bacterial infection that can damage the lungs or other parts of the body. The TB skin test can tell if you have TB bacteria in your body. Many people are exposed to TB and test positive for TB bacteria in their bodies, but they don't get the disease. TB bacteria can stay in your body without making you sick. This is because your immune system can keep TB in check. Your doctor may want you to have a TB skin test if you have been in close contact with someone who has TB. Or you may need the test if you have symptoms that might be caused by TB, such as a cough that does not go away, a fever, or weight loss. You also may get the test if you are a health care worker. During the skin test, part of a TB bacterium is injected under your skin. The test will feel like a skin prick. If you have TB bacteria in your body, a firm red bump will form at the shot site within 2 days.  If the test shows that you are infected with TB (positive), your doctor probably will order more tests. A TB-positive skin test can't tell when you became infected with TB. And it can't tell whether the infection can be passed to others. Follow-up care is a key part of your treatment and safety. Be sure to make and go to all appointments, and call your doctor if you are having problems. It's also a good idea to know your test results and keep a list of the medicines you take. How can you care for yourself at home? · Do not scratch the test site. Scratching it may cause redness or swelling. This could affect the test results. · To ease itching, put a cold washcloth on the site. Then pat the site dry. · Do not cover the test site with a bandage or other dressing. · Go back to your doctor's office or hospital to have the test read on the follow-up date. This must be done between 48 and 72 hours after you get the shot. When should you call for help? Watch closely for changes in your health, and be sure to contact your doctor if you have any problems. Where can you learn more? Go to http://libby-orlando.info/. Enter (91) 0979-7561 in the search box to learn more about \"Tuberculin Skin Test: Care Instructions. \" Current as of: March 3, 2017 Content Version: 11.4 © 7393-3607 SomethingIndie. Care instructions adapted under license by [employer] (which disclaims liability or warranty for this information). If you have questions about a medical condition or this instruction, always ask your healthcare professional. [place] [address] any warranty or liability for your use of this information. Introducing [facility]! Dear Johnny Landau: Thank you for requesting a [employer] account. Our records indicate that you already have an active [employer] account. You can access your account anytime at [email or web address] Did you know that you can access your hospital and ER discharge instructions at any time in Lovejuice? You can also review all of your test results from your hospital stay or ER visit. Additional Information If you have questions, please visit the Frequently Asked Questions section of the Lovejuice website at [email or web address] Mira [email or web address]/. Remember, Lovejuice is NOT to be used for urgent needs. For medical emergencies, dial 911. Now available from your iPhone and Android! Please provide this summary of care documentation to your next provider. Your primary care clinician is listed as ZAKIYA BEGUM. If you have any questions after today's visit, please call [phone].

## 2018-06-08 NOTE — LETTER
6/11/2018 9:08 AM 
 
Ms. Flavia Lerner [address] [place] [id number] To Whom It May Concern; 
 
 
PPD negative. 0 mm.  
 
 
 
 
Mindy Pricne LPN

## 2018-06-11 LAB
MM INDURATION POC: 0 MM (ref 0–5)
PPD POC: NEGATIVE NEGATIVE

## 2018-06-12 ENCOUNTER — HOSPITAL ENCOUNTER (OUTPATIENT)
Dept: PHYSICAL THERAPY | Age: 53
Discharge: HOME | End: 2018-06-12
Payer: OTHER GOVERNMENT

## 2018-06-12 PROCEDURE — 97110 THERAPEUTIC EXERCISES: CPT

## 2018-06-12 PROCEDURE — 97140 MANUAL THERAPY 1/> REGIONS: CPT

## 2018-06-12 PROCEDURE — 97112 NEUROMUSCULAR REEDUCATION: CPT

## 2018-06-12 NOTE — PROGRESS NOTES
PT DISCHARGE DAILY NOTE AND [id number]    Date:2018  Patient name: Sarmad Suarez Start of Care: 18   Referral source: Esteban Childers MD : 1965   Medical/Treatment Diagnosis: Right shoulder pain [M25.511] Onset Date:chronic with exacerbation 2 months ago     Prior Hospitalization: see medical history Provider#: [id number]   Medications: Verified on Patient Summary List    Comorbidities: HTN, GI disease, BMI > 30   Prior Level of Function: The patient states that she was working void of lifting restrictions prior to recent exacerbation. Visits from Start of Care: 2    Missed Visits: 0    Reporting Period : 18 to 18        Patient Name: Sarmad Suarez  Date:2018  : 1965  [x]  Patient  Verified  Payor: Danita Vigil / Plan: Rubin Grow / Product Type: Commerical /    In time:834  Out time:913  Total Treatment Time (min): 39  Visit #: 2 of 8    Treatment Area: Right shoulder pain [M25.511]    SUBJECTIVE  Pain Level (0-10 scale): 0  Any medication changes, allergies to medications, adverse drug reactions, diagnosis change, or new procedure performed?: [x] No    [] Yes (see summary sheet for update)  Subjective functional status/changes:   [] No changes reported  I don't have any pain, but I still have tingling in my neck, arms, and legs.      OBJECTIVE    23 min Therapeutic Exercise:  [] See flow sheet :   Rationale: increase ROM and increase strength to improve the patients ability to perform daily activities     8 min Neuromuscular Re-education:  []  See flow sheet :   Rationale: increase strength, improve coordination, improve balance and increase proprioception  to improve the patient's stability for work tasks    8 min Manual Therapy:  Per flow sheet   Rationale: decrease pain, increase ROM and increase tissue extensibility to increase ease with work tasks          With   [] TE   [] TA   [] neuro   [] other: Patient Education: [x] Review HEP    [] Progressed/Changed HEP based on:   [] positioning   [] body mechanics   [] transfers   [] heat/ice application    [] other:      Other Objective/Functional Measures:      Pain Level (0-10 scale) post treatment: 0    ASSESSMENT/Changes in Function: Patient feels that PT is only going to help manage her symptoms. She reports she would like to be discharged at this time. []  See Plan of Care  []  See progress note/recertification  [x]  See Discharge Summary         Progress towards goals / Updated goals:  Short Term Goals: To be accomplished in 2 weeks:                         3. The patient will display independence and compliance with HEP to maximize therapeutic benefit. Reports compliance 6/12/18                         2. The patient will improve cervical rotation to 60 degrees to improve ease of driving. Long Term Goals: To be accomplished in 4 weeks:                         1. The patient will improve FOTO score to 56 to maximize quality of life.                         2. The patient will report 75% resolution of right UE paresthesias to improve ease of ADLs.                        9. The patient will demonstrate mid trap strength to 4/5 MMT to maximize stability during [place] reaching/lifting.                          4. The patient will improve cervical sidebending to 30 degrees to improve ease of dressing. and performing ADLs.        PLAN  []  Upgrade activities as tolerated     [x]  Continue plan of care  []  Update interventions per flow sheet       [x]  Discharge due to:_Patient wishes to have surgery  []  Other:_      Vielka Jones PT 6/12/2018  8:39 AM    Future Appointments  Date Time Provider Chay Meng   8/24/2018 7:10 AM [facility] NURSE [facility] DAMON SCHED   8/31/2018 9:00 AM Perfecto Hernandez MD [facility]

## 2018-06-15 ENCOUNTER — OFFICE VISIT (OUTPATIENT)
Dept: FAMILY MEDICINE CLINIC | Age: 53
End: 2018-06-15

## 2018-06-15 VITALS
DIASTOLIC BLOOD PRESSURE: 85 MMHG | TEMPERATURE: 98.2 F | HEIGHT: 65 IN | WEIGHT: 195 LBS | BODY MASS INDEX: 32.49 KG/M2 | SYSTOLIC BLOOD PRESSURE: 137 MMHG | RESPIRATION RATE: 18 BRPM | HEART RATE: 76 BPM | OXYGEN SATURATION: 98 %

## 2018-06-15 DIAGNOSIS — I10 ESSENTIAL HYPERTENSION: Primary | ICD-10-CM

## 2018-06-15 DIAGNOSIS — E78.00 PURE HYPERCHOLESTEROLEMIA: ICD-10-CM

## 2018-06-15 DIAGNOSIS — M48.02 SPINAL STENOSIS OF CERVICAL REGION: ICD-10-CM

## 2018-06-15 RX ORDER — OMEPRAZOLE 40 MG/1
40 CAPSULE, DELAYED RELEASE ORAL DAILY
COMMUNITY

## 2018-06-15 NOTE — PROGRESS NOTES
Preoperative Evaluation    Date of Exam: 6/15/2018    Maricel Gilmore is a 47 y.o. female (:1965) who presents for preoperative evaluation. Procedure/Surgery: Anterior cervical discectomy and fusion extending from C5-C7  Date of Procedure/Surgery: 18  Surgeon: Dr Hebert Montejo: 1 [address]    Primary Physician: Jessica Cartwright MD    Patient presents today for medical clearance for surgery above. Patient has a long two-year history of neck pain with radiation of pain down her arms with associated numbness tingling. Patient is also complaining of no numbness in her legs. MRI shows spinal stenosis and even with significant physical therapy her symptoms continue to persist.  Patient is in generally good health she has a history of hypertension that is well-controlled on clonidine patch and Zestoretic. High cholesterol has been controlled on simvastatin. She has a history of severe gastroesophageal reflux that has been controlled on Prilosec 40 mg and Zantac. Patient has a long history of hypertension and has had some mild LVH on EKG for years. Her recent EKG is unchanged from previous EKGs. Patient is at low risk for the surgery above and is medically cleared to proceed without any further testing beyond her routine preop testing. Medical History:     Past Medical History:   Diagnosis Date    Cardiomegaly     Diverticulosis     GERD (gastroesophageal reflux disease)     H/O tubal ligation     H/O: hysterectomy     Hiatal hernia     History of appendectomy     Hypercholesterolemia     Hypertension     Obesity, unspecified     SUBHASH on CPAP 2013    S/P mastectomy, bilateral     2010 : preventative    Undiagnosed cardiac murmurs     Unspecified adverse effect of anesthesia     was hypothermic in past     Allergies:      Allergies   Allergen Reactions    Bactrim [Sulfamethoprim Ds] Other (comments)     Made mouth feel thick, speech slurred      Medications:     Current Outpatient Prescriptions   Medication Sig    omeprazole (PRILOSEC) 40 mg capsule Take 40 mg by mouth daily.  lisinopril-hydroCHLOROthiazide (PRINZIDE, ZESTORETIC) 20-12.5 mg per tablet TAKE 1 TABLET BY MOUTH ONCE DAILY    levalbuterol tartrate (XOPENEX) 45 mcg/actuation inhaler INHALE 1-2 PUFFS EVERY 4 TO 6 HOURS AS NEEDED    simvastatin (ZOCOR) 20 mg tablet TAKE 1 TABLET BY MOUTH AT BEDTIME    cloNIDine (CATAPRES) 0.1 mg/24 hr patch APPLY 1 PATCH BY TRANSDERMAL ROUTE EVERY 7 DAYS.    YUVAFEM 10 mcg tab vaginal tablet INSERT 1 APPLICATORFUL VAGINALLY AT BEDTIME FOR 14 DAYS THEN TWICE PER WEEK    OTHER alestrian hormone cream    ranitidine (ZANTAC) 300 mg tablet Take 300 mg by mouth daily as needed.  CLINDESSE 2 % SR vaginal cream     vitamin e 400 unit tab Take 1 Cap by mouth daily.  Cholecalciferol, Vitamin D3, (VITAMIN D) 2,000 unit cap capsule Take 2,000 Units by mouth daily.  cpap machine kit by Does Not Apply route. Replace mask, headgear, heated hose, filters and accessories as needed for one year. CPAP pressure: 9 cwp  Mask: standard Mirage FX plus Opus nasal pillows mask, or mask of choice  [employer]: MED  Download data 30 days after initiation of therapy    cyclobenzaprine (FLEXERIL) 5 mg tablet TAKE 1-2 TABLETS BY MOUTH AT NIGHT.  meclizine (ANTIVERT) 25 mg tablet TAKE 1 TAB BY MOUTH THREE (3) TIMES DAILY AS NEEDED FOR UP TO 10 DAYS.  LACTOBACILLUS ACIDOPHILUS (PROBIOTIC PO) Take  by mouth.  OTHER Tumeric 1 cap daily    valACYclovir (VALTREX) 500 mg tablet Take 500 mg by mouth daily.  MILK THISTLE PO Take 1 Cap by mouth daily. No current facility-administered medications for this visit.       Surgical History:     Past Surgical History:   Procedure Laterality Date    HX BREAST RECONSTRUCTION  2011    nipple reconstruction 2011    HX HYSTERECTOMY  2003    HX MASTECTOMY  2010    bilateral    HX OOPHORECTOMY  2010 Social History:     Social History     Social History    Marital status:      Spouse name: N/A    Number of children: N/A    Years of education: N/A     Social History Main Topics    Smoking status: Never Smoker    Smokeless tobacco: Never Used    Alcohol use No    Drug use: No    Sexual activity: Yes     Partners: Male     Birth control/ protection: Surgical     Other Topics Concern    None     Social History Narrative    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 [employer]       Recent use of: No recent use of aspirin (ASA), NSAIDS or steroids    Anesthesia Complications: None  History of abnormal bleeding : None      REVIEW OF SYSTEMS:  Respiratory: negative for dyspnea on exertion  Cardiovascular: negative for chest pain    EXAM:   Visit Vitals    /85 (BP 1 Location: Left arm, BP Patient Position: Sitting)    Pulse 76    Temp 98.2 °F (36.8 °C) (Oral)    Resp 18    Ht 5' 5\" (1.651 m)    Wt 195 lb (88.5 kg)    SpO2 98%    BMI 32.45 kg/m2     Eyes - pupils equal and reactive, extraocular eye movements intact  Mouth - mucous membranes moist, pharynx normal without lesions  Chest - clear to auscultation, no wheezes, rales or rhonchi, symmetric air entry  Heart - normal rate, regular rhythm, normal S1, S2, no murmurs, rubs, clicks or gallops  Extremities - peripheral pulses normal, no pedal edema, no clubbing or cyanosis      DIAGNOSTICS:   1. EKG: EKG FINDINGS - normal EKG, normal sinus rhythm, unchanged from previous tracings, LVH    2. Labs: Reviewed    IMPRESSION:           ICD-10-CM ICD-9-CM    1. Essential hypertension I10 401.9  well-controlled on Zestoretic and clonidine patch. Patient to take Zestoretic a.m. of surgery with sip of water   2. Pure hypercholesterolemia E78.00 272.0  well-controlled on simvastatin. Patient has had some mildly elevated LFTs in the past and wanted to use red yeast rice in place of simvastatin. Patient was educated that red yeast rice is just a mild form of a statin. She still can consider trying it after her surgery above.    3. Spinal stenosis of cervical region M48.02 723.0  patient medically cleared for surgery above     Angela Sotelo MD   6/15/2018

## 2018-06-15 NOTE — MR AVS SNAPSHOT
[facility], [place], operated by [facility] 
 
 
 [address], [place] [address] [address] 
[phone] Patient: Brandon Corona MRN: [id number] :1965 Visit Information Date & Time Provider Department Dept. Phone Encounter #  
 6/15/2018  2:15 PM Isabel Beltran, [address] [place] [address] [phone] [id number] Follow-up Instructions Return if symptoms worsen or fail to improve. Your Appointments 2018  7:10 AM  
LAB with [facility] (DAMON Bergeron) Appt Note: 4 mos labs [address] [facility] [address] [address]  
[phone]  
  
   
 [address], [facility]  
  
    
 2018  9:00 AM  
Office Visit with Isabel Beltran MD  
[address] [facility] Appt Note: 4 mos fu per [facility]  
 [address], [facility] [address] [address]  
[phone]  
  
   
 [address],  [address] Upcoming Health Maintenance Date Due Hepatitis C Screening 1965 PAP AKA CERVICAL CYTOLOGY 2014 DTaP/Tdap/Td series (2 - Td) 2017 Influenza Age 5 to Adult 2018 COLONOSCOPY 2020 Allergies as of 6/15/2018  Review Complete On: 6/15/2018 By: Isabel Beltran MD  
  
 Severity Noted Reaction Type Reactions Bactrim [Sulfamethoprim Ds]  12/15/2011    Other (comments) Made mouth feel thick, speech slurred Current Immunizations  Reviewed on 2018 Name Date Influenza Vaccine 10/15/2015, 10/1/2009 12:00 AM  
 Influenza Vaccine Nasal 2012 Influenza Vaccine Whole 2011 Novel Influenza-H1N1-09, All Formulations 10/1/2009 12:00 AM  
 TB Skin Test (PPD) Intradermal 2018  8:51 AM  
 TDAP Vaccine 2007 Not reviewed this visit You Were Diagnosed With   
  
 Codes Comments Essential hypertension    -  Primary ICD-10-CM: I10 
ICD-9-CM: 401.9  Pure hypercholesterolemia     ICD-10-CM: E78.00 
 ICD-9-CM: 272.0 Spinal stenosis of cervical region     ICD-10-CM: M48.02 
ICD-9-CM: 723.0 Vitals BP Pulse Temp Resp Height(growth percentile) Weight(growth percentile) 137/85 (BP 1 Location: Left arm, BP Patient Position: Sitting) 76 98.2 °F (36.8 °C) (Oral) 18 5' 5\" (1.651 m) 195 lb (88.5 kg) SpO2 BMI OB Status Smoking Status 98% 32.45 kg/m2 Hysterectomy Never Smoker BMI and BSA Data Body Mass Index Body Surface Area  
 32.45 kg/m 2 2.01 m 2 Preferred Pharmacy Pharmacy Name Phone [id number], [address] [facility] [facility] [phone] Your Updated Medication List  
  
   
This list is accurate as of 6/15/18  3:03 PM.  Always use your most recent med list.  
  
  
  
  
 Cholecalciferol (Vitamin D3) 2,000 unit Cap capsule Commonly known as:  VITAMIN D Take 2,000 Units by mouth daily. CLINDESSE 2 % SR vaginal cream  
Generic drug:  clindamycin  
  
 cloNIDine 0.1 mg/24 hr patch Commonly known as:  CATAPRES  
APPLY 1 PATCH BY TRANSDERMAL ROUTE EVERY 7 DAYS. cpap machine kit  
by Does Not Apply route. Replace mask, headgear, heated hose, filters and accessories as needed for one year. CPAP pressure: 9 cwp Mask: standard Mirage FX plus Opus nasal pillows mask, or mask of choice Homecare Company: MED Download data 30 days after initiation of therapy  
  
 cyclobenzaprine 5 mg tablet Commonly known as:  FLEXERIL  
TAKE 1-2 TABLETS BY MOUTH AT NIGHT. levalbuterol tartrate 45 mcg/actuation inhaler Commonly known as:  XOPENEX  
INHALE 1-2 PUFFS EVERY 4 TO 6 HOURS AS NEEDED  
  
 lisinopril-hydroCHLOROthiazide 20-12.5 mg per tablet Commonly known as:  PRINZIDE, ZESTORETIC  
TAKE 1 TABLET BY MOUTH ONCE DAILY  
  
 meclizine 25 mg tablet Commonly known as:  ANTIVERT  
TAKE 1 TAB BY MOUTH THREE (3) TIMES DAILY AS NEEDED FOR UP TO 10 DAYS. MILK THISTLE PO Take 1 Cap by mouth daily. omeprazole 40 mg capsule Commonly known as:  PRILOSEC Take 40 mg by mouth daily. OTHER Tumeric 1 cap daily OTHER  
alestrian hormone cream  
  
 PROBIOTIC PO Take  by mouth. raNITIdine 300 mg tablet Commonly known as:  ZANTAC Take 300 mg by mouth daily as needed. simvastatin 20 mg tablet Commonly known as:  ZOCOR  
TAKE 1 TABLET BY MOUTH AT BEDTIME  
  
 valACYclovir 500 mg tablet Commonly known as:  VALTREX Take 500 mg by mouth daily. vitamin e 400 unit Tab Take 1 Cap by mouth daily. YUVAFEM 10 mcg Tab vaginal tablet Generic drug:  estradiol INSERT 1 APPLICATORFUL VAGINALLY AT BEDTIME FOR 14 DAYS THEN TWICE PER WEEK Follow-up Instructions Return if symptoms worsen or fail to improve. Patient Instructions Shoulder Pain: Care Instructions Your Care Instructions You can hurt your shoulder by using it too much during an activity, such as fishing or baseball. It can also happen as part of the everyday wear and tear of getting older. Shoulder injuries can be slow to heal, but your shoulder should get better with time. Your doctor may recommend a sling to rest your shoulder. If you have injured your shoulder, you may need testing and treatment. Follow-up care is a key part of your treatment and safety. Be sure to make and go to all appointments, and call your doctor if you are having problems. It's also a good idea to know your test results and keep a list of the medicines you take. How can you care for yourself at home? · Take pain medicines exactly as directed. ¨ If the doctor gave you a prescription medicine for pain, take it as prescribed. ¨ If you are not taking a prescription pain medicine, ask your doctor if you can take an over-the-counter medicine. ¨ Do not take two or more pain medicines at the same time unless the doctor told you to.  Many pain medicines contain acetaminophen, which is Tylenol. Too much acetaminophen (Tylenol) can be harmful. · If your doctor recommends that you wear a sling, use it as directed. Do not take it off before your doctor tells you to. · Put ice or a cold pack on the sore area for 10 to 20 minutes at a time. Put a thin cloth between the ice and your skin. · If there is no swelling, you can put moist heat, a heating pad, or a warm cloth on your shoulder. Some doctors suggest alternating between hot and cold. · Rest your shoulder for a few days. If your doctor recommends it, you can then begin gentle exercise of the shoulder, but do not lift anything heavy. When should you call for help? Call 911 anytime you think you may need emergency care. For example, call if: 
? · You have chest pain or pressure. This may occur with: ¨ Sweating. ¨ Shortness of breath. ¨ Nausea or vomiting. ¨ Pain that spreads from the chest to the neck, jaw, or one or both shoulders or arms. ¨ Dizziness or lightheadedness. ¨ A fast or uneven pulse. After calling 911, chew 1 adult-strength aspirin. Wait for an ambulance. Do not try to drive yourself. ? · Your arm or hand is cool or pale or changes color. ?Call your doctor now or seek immediate medical care if: 
? · You have signs of infection, such as: 
¨ Increased pain, swelling, warmth, or redness in your shoulder. ¨ Red streaks leading from a place on your shoulder. ¨ Pus draining from an area of your shoulder. ¨ Swollen lymph nodes in your neck, armpits, or groin. ¨ A fever. ? Watch closely for changes in your health, and be sure to contact your doctor if: 
? · You cannot use your shoulder. ? · Your shoulder does not get better as expected. Where can you learn more? Go to http://libby-orladno.info/. Enter M074 in the search box to learn more about \"Shoulder Pain: Care Instructions. \" Current as of: March 21, 2017 Content Version: 11.4 © 2184-6569 Healthwise, [employer]orporated. Care instructions adapted under license by [employer] (which disclaims liability or warranty for this information). If you have questions about a medical condition or this instruction, always ask your healthcare professional. [address] any warranty or liability for your use of this information. Introducing [facility]! Dear Berta Paniagua: Thank you for requesting a [facility] account. Our records indicate that you already have an active [facility] account. You can access your account anytime at [email or web address] Did you know that you can access your hospital and ER discharge instructions at any time in [facility]? You can also review all of your test results from your hospital stay or ER visit. Additional Information If you have questions, please visit the Frequently Asked Questions section of the [facility] website at [email or web address] Remember, [facility] is NOT to be used for urgent needs. For medical emergencies, dial 911. Now available from your iPhone and Android! Please provide this summary of care documentation to your next provider. Your primary care clinician is listed as ZAKIYA BEGUM. If you have any questions after today's visit, please call [phone].

## 2018-06-15 NOTE — PROGRESS NOTES
Patient is in the office today for a pre op clearance. 1. Have you been to the ER, urgent care clinic since your last visit? Hospitalized since your last visit? No    2. Have you seen or consulted any other health care providers outside of the [address] [place] [address] since your last visit?   Include any pap smears or colon screening. yes, Dr. Amin Home

## 2018-06-15 NOTE — PATIENT INSTRUCTIONS
Shoulder Pain: Care Instructions  Your Care Instructions    You can hurt your shoulder by using it too much during an activity, such as fishing or baseball. It can also happen as part of the everyday wear and tear of getting older. Shoulder injuries can be slow to heal, but your shoulder should get better with time. Your doctor may recommend a sling to rest your shoulder. If you have injured your shoulder, you may need testing and treatment. Follow-up care is a key part of your treatment and safety. Be sure to make and go to all appointments, and call your doctor if you are having problems. It's also a good idea to know your test results and keep a list of the medicines you take. How can you care for yourself at home? · Take pain medicines exactly as directed. ¨ If the doctor gave you a prescription medicine for pain, take it as prescribed. ¨ If you are not taking a prescription pain medicine, ask your doctor if you can take an over-the-counter medicine. ¨ Do not take two or more pain medicines at the same time unless the doctor told you to. Many pain medicines contain acetaminophen, which is Tylenol. Too much acetaminophen (Tylenol) can be harmful. · If your doctor recommends that you wear a sling, use it as directed. Do not take it off before your doctor tells you to. · Put ice or a cold pack on the sore area for 10 to 20 minutes at a time. Put a thin cloth between the ice and your skin. · If there is no swelling, you can put moist heat, a heating pad, or a warm cloth on your shoulder. Some doctors suggest alternating between hot and cold. · Rest your shoulder for a few days. If your doctor recommends it, you can then begin gentle exercise of the shoulder, but do not lift anything heavy. When should you call for help? Call 911 anytime you think you may need emergency care. For example, call if:  ? · You have chest pain or pressure. This may occur with:  ¨ Sweating.   ¨ Shortness of breath. ¨ Nausea or vomiting. ¨ Pain that spreads from the chest to the neck, jaw, or one or both shoulders or arms. ¨ Dizziness or lightheadedness. ¨ A fast or uneven pulse. After calling 911, chew 1 adult-strength aspirin. Wait for an ambulance. Do not try to drive yourself. ? · Your arm or hand is cool or pale or changes color. ?Call your doctor now or seek immediate medical care if:  ? · You have signs of infection, such as:  ¨ Increased pain, swelling, warmth, or redness in your shoulder. ¨ Red streaks leading from a place on your shoulder. ¨ Pus draining from an area of your shoulder. ¨ Swollen lymph nodes in your neck, armpits, or groin. ¨ A fever. ? Watch closely for changes in your health, and be sure to contact your doctor if:  ? · You cannot use your shoulder. ? · Your shoulder does not get better as expected. Where can you learn more? Go to http://libby-orlando.info/. Enter S943 in the search box to learn more about \"Shoulder Pain: Care Instructions. \"  Current as of: March 21, 2017  Content Version: 11.4  © 6267-1622 [employer]. Care instructions adapted under license by [employer] (which disclaims liability or warranty for this information). If you have questions about a medical condition or this instruction, always ask your healthcare professional. [place] [address] any warranty or liability for your use of this information.

## 2018-08-21 LAB
25(OH)D3 SERPL-MCNC: 30 NG/ML (ref 30–100)
ALB/GLOBRATIO, 58C: 1.5 (CALC) (ref 1–2.5)
ALBUMIN SERPL-MCNC: 4.4 G/DL (ref 3.6–5.1)
ALP SERPL-CCNC: 92 U/L (ref 33–130)
ALT SERPL-CCNC: 51 U/L (ref 6–29)
AST SERPL W P-5'-P-CCNC: 39 U/L (ref 10–35)
BILIRUB SERPL-MCNC: 0.3 MG/DL (ref 0.2–1.2)
BUN SERPL-MCNC: 10 MG/DL (ref 7–25)
BUN/CREATININE RATIO,BUCR: ABNORMAL (CALC) (ref 6–22)
CALCIUM SERPL-MCNC: 9.7 MG/DL (ref 8.6–10.4)
CHLORIDE SERPL-SCNC: 103 MMOL/L (ref 98–110)
CHOL/HDL RATIO,CHHDX: 3.6 (CALC)
CHOLEST SERPL-MCNC: 171 MG/DL
CO2 SERPL-SCNC: 24 MMOL/L (ref 20–32)
CREAT SERPL-MCNC: 1.03 MG/DL (ref 0.5–1.05)
GLOBULIN,GLOB: 3 G/DL (CALC) (ref 1.9–3.7)
GLUCOSE SERPL-MCNC: 87 MG/DL (ref 65–99)
HBA1C MFR BLD HPLC: 5.7 % OF TOTAL HGB
HDLC SERPL-MCNC: 47 MG/DL
HEPATITIS C AB,HCAB: NORMAL
LDL-CHOLESTEROL: 105 MG/DL (CALC)
NON-HDL CHOLESTEROL, 011976: 124 MG/DL (CALC)
POTASSIUM SERPL-SCNC: 4 MMOL/L (ref 3.5–5.3)
PROT SERPL-MCNC: 7.4 G/DL (ref 6.1–8.1)
SIGNAL TO CUTOFF (HEP C), 619802: 0.01
SODIUM SERPL-SCNC: 139 MMOL/L (ref 135–146)
TRIGL SERPL-MCNC: 95 MG/DL (ref ?–150)

## 2018-09-09 RX ORDER — CLONIDINE 0.1 MG/24H
PATCH, EXTENDED RELEASE TRANSDERMAL
Qty: 12 PATCH | Refills: 3 | Status: SHIPPED | OUTPATIENT
Start: 2018-09-09 | End: 2019-07-25 | Stop reason: SDUPTHER

## 2018-09-10 RX ORDER — CLONIDINE 0.1 MG/24H
PATCH, EXTENDED RELEASE TRANSDERMAL
Qty: 12 PATCH | Refills: 3 | Status: CANCELLED | OUTPATIENT
Start: 2018-09-10

## 2018-09-10 NOTE — TELEPHONE ENCOUNTER
Requested Prescriptions     Pending Prescriptions Disp Refills    cloNIDine (CATAPRES) 0.1 mg/24 hr patch 12 Patch 3

## 2018-12-21 LAB
25(OH)D3 SERPL-MCNC: 25 NG/ML (ref 30–100)
ALB/GLOBRATIO, 58C: 1.4 (CALC) (ref 1–2.5)
ALBUMIN SERPL-MCNC: 4.2 G/DL (ref 3.6–5.1)
ALP SERPL-CCNC: 82 U/L (ref 33–130)
ALT SERPL-CCNC: 51 U/L (ref 6–29)
AST SERPL W P-5'-P-CCNC: 39 U/L (ref 10–35)
BILIRUB SERPL-MCNC: 0.3 MG/DL (ref 0.2–1.2)
BUN SERPL-MCNC: 15 MG/DL (ref 7–25)
BUN/CREATININE RATIO,BUCR: ABNORMAL (CALC) (ref 6–22)
CALCIUM SERPL-MCNC: 9.9 MG/DL (ref 8.6–10.4)
CHLORIDE SERPL-SCNC: 104 MMOL/L (ref 98–110)
CHOL/HDL RATIO,CHHDX: 3.6 (CALC)
CHOLEST SERPL-MCNC: 164 MG/DL
CO2 SERPL-SCNC: 26 MMOL/L (ref 20–32)
CREAT SERPL-MCNC: 0.91 MG/DL (ref 0.5–1.05)
GLOBULIN,GLOB: 3 G/DL (CALC) (ref 1.9–3.7)
GLUCOSE SERPL-MCNC: 99 MG/DL (ref 65–99)
HBA1C MFR BLD HPLC: 5.7 % OF TOTAL HGB
HDLC SERPL-MCNC: 45 MG/DL
LDL-CHOLESTEROL: 99 MG/DL (CALC)
NON-HDL CHOLESTEROL, 011976: 119 MG/DL (CALC)
POTASSIUM SERPL-SCNC: 4.3 MMOL/L (ref 3.5–5.3)
PROT SERPL-MCNC: 7.2 G/DL (ref 6.1–8.1)
SODIUM SERPL-SCNC: 139 MMOL/L (ref 135–146)
TRIGL SERPL-MCNC: 102 MG/DL (ref ?–150)

## 2018-12-27 ENCOUNTER — OFFICE VISIT (OUTPATIENT)
Dept: FAMILY MEDICINE CLINIC | Age: 53
End: 2018-12-27

## 2018-12-27 ENCOUNTER — DOCUMENTATION ONLY (OUTPATIENT)
Dept: FAMILY MEDICINE CLINIC | Age: 53
End: 2018-12-27

## 2018-12-27 VITALS
DIASTOLIC BLOOD PRESSURE: 83 MMHG | BODY MASS INDEX: 32.15 KG/M2 | HEART RATE: 78 BPM | SYSTOLIC BLOOD PRESSURE: 126 MMHG | WEIGHT: 193 LBS | HEIGHT: 65 IN | RESPIRATION RATE: 20 BRPM | OXYGEN SATURATION: 99 % | TEMPERATURE: 98.9 F

## 2018-12-27 DIAGNOSIS — E55.9 VITAMIN D DEFICIENCY: ICD-10-CM

## 2018-12-27 DIAGNOSIS — R73.03 PREDIABETES: ICD-10-CM

## 2018-12-27 DIAGNOSIS — E78.00 PURE HYPERCHOLESTEROLEMIA: ICD-10-CM

## 2018-12-27 DIAGNOSIS — I10 ESSENTIAL HYPERTENSION: Primary | ICD-10-CM

## 2018-12-27 NOTE — PROGRESS NOTES
Subjective:       Chief Complaint  The patient presents for follow up of hypertension and high cholesterol. Vit D deficiency         HPI  Vickie Renteria is a 46 y.o. female seen for follow up of hyperlipidemia. Shealso has hypertension. Hypertension well controlled, no significant medication side effects noted, on catapres which is also for hot flashes and zestoretic , hyperlipidemia borderline controlled on Zocor . LDL is 99, no significant medication side effects noted, on Zocor. LFTs are mildly elevated. Pt continues to struggle with with losing weight. She plans to work in the [address] [place] [address] Rd more this year to get prediabeties and weight under better control. Will do letter so she can be reimburse through health care savings account. Diet and Lifestyle: generally follows a low fat low cholesterol diet, exercises sporadically    Home BP Monitoring: is not measured at home. Other symptoms and concerns: vit D level is borderline controlled on just prenatal vitamin. She will add vit D 2000 units/day to improve levels. Prediabetes  Fairly stable with diet     Current Outpatient Medications   Medication Sig    simvastatin (ZOCOR) 20 mg tablet TAKE 1 TABLET BY MOUTH AT BEDTIME    cloNIDine (CATAPRES) 0.1 mg/24 hr patch APPLY 1 PATCH BY TRANSDERMAL ROUTE EVERY 7 DAYS.  omeprazole (PRILOSEC) 40 mg capsule Take 40 mg by mouth daily.  lisinopril-hydroCHLOROthiazide (PRINZIDE, ZESTORETIC) 20-12.5 mg per tablet TAKE 1 TABLET BY MOUTH ONCE DAILY    YUVAFEM 10 mcg tab vaginal tablet INSERT 1 APPLICATORFUL VAGINALLY AT BEDTIME FOR 14 DAYS THEN TWICE PER WEEK    OTHER alestrian hormone cream    ranitidine (ZANTAC) 300 mg tablet Take 300 mg by mouth daily as needed.  CLINDESSE 2 % SR vaginal cream     vitamin e 400 unit tab Take 1 Cap by mouth daily.  Cholecalciferol, Vitamin D3, (VITAMIN D) 2,000 unit cap capsule Take 2,000 Units by mouth daily.  cpap machine kit by Does Not Apply route.  Replace mask, headgear, heated hose, filters and accessories as needed for one year. CPAP pressure: 9 cwp  Mask: standard Mirage FX plus Opus nasal pillows mask, or mask of choice  [employer]: [employer]  Download data 30 days after initiation of therapy    levalbuterol tartrate (XOPENEX) 45 mcg/actuation inhaler INHALE 1-2 PUFFS EVERY 4 TO 6 HOURS AS NEEDED    cyclobenzaprine (FLEXERIL) 5 mg tablet TAKE 1-2 TABLETS BY MOUTH AT NIGHT.  meclizine (ANTIVERT) 25 mg tablet TAKE 1 TAB BY MOUTH THREE (3) TIMES DAILY AS NEEDED FOR UP TO 10 DAYS.  LACTOBACILLUS ACIDOPHILUS (PROBIOTIC PO) Take  by mouth.  OTHER Tumeric 1 cap daily    valACYclovir (VALTREX) 500 mg tablet Take 500 mg by mouth daily.  MILK THISTLE PO Take 1 Cap by mouth daily. No current facility-administered medications for this visit.               Review of Systems  Respiratory: negative for dyspnea on exertion  Cardiovascular: negative for chest pain    Objective:     Visit Vitals  /83 (BP 1 Location: Left arm, BP Patient Position: Sitting)   Pulse 78   Temp 98.9 °F (37.2 °C) (Oral)   Resp 20   Ht 5' 5\" (1.651 m)   Wt 193 lb (87.5 kg)   SpO2 99%   BMI 32.12 kg/m²        General appearance - alert, well appearing, and in no distress  Chest - clear to auscultation, no wheezes, rales or rhonchi, symmetric air entry  Heart - normal rate, regular rhythm, normal S1, S2, no murmurs, rubs, clicks or gallops  Extremities - peripheral pulses normal, no pedal edema, no clubbing or cyanosis  Skin - normal coloration and turgor, no rashes, no suspicious skin lesions noted      Labs:   Lab Results   Component Value Date/Time    Cholesterol, total 164 12/20/2018 08:07 AM    HDL Cholesterol 45 (L) 12/20/2018 08:07 AM    LDL-CHOLESTEROL 99 12/20/2018 08:07 AM    LDL, calculated 101 (H) 08/12/2015 09:00 AM    LDL-C, External 96 04/23/2015    Triglyceride 102 12/20/2018 08:07 AM    Cholesterol/HDL ratio 3.6 12/20/2018 08:07 AM     Lab Results   Component Value Date/Time    Sodium 139 12/20/2018 08:07 AM    Potassium 4.3 12/20/2018 08:07 AM    Chloride 104 12/20/2018 08:07 AM    CO2 26 12/20/2018 08:07 AM    Anion gap 5 10/18/2016 05:28 PM    Glucose 99 12/20/2018 08:07 AM    BUN 15 12/20/2018 08:07 AM    Creatinine 0.91 12/20/2018 08:07 AM    BUN/Creatinine ratio NOT APPLICABLE 36/45/5081 64:91 AM    GFR est AA 83 12/20/2018 08:07 AM    GFR est non-AA 72 12/20/2018 08:07 AM    Calcium 9.9 12/20/2018 08:07 AM    Bilirubin, total 0.3 12/20/2018 08:07 AM    ALT (SGPT) 51 (H) 12/20/2018 08:07 AM    AST (SGOT) 39 (H) 12/20/2018 08:07 AM    Alk. phosphatase 82 12/20/2018 08:07 AM    Protein, total 7.2 12/20/2018 08:07 AM    Albumin 4.2 12/20/2018 08:07 AM    Globulin 3.0 12/20/2018 08:07 AM    A-G Ratio 1.1 10/18/2016 05:28 PM              Assessment / Plan     Hypertension well controlled, on current meds  Hyperlipidemia borderline controlled, on Zocor. Will try to improve it further with diet and weight loss. WIll continue to monitor LFTs        ICD-10-CM ICD-9-CM    1. Essential hypertension C40 198.0 METABOLIC PANEL, COMPREHENSIVE   2. Pure hypercholesterolemia E78.00 272.0 LIPID PANEL   3. Vitamin D deficiency E55.9 268.9 Not optimally controlled on prenatal vitamins. 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 Follow-up Disposition:  Return in about 6 months (around 6/27/2019) for labs 1 week before. Reviewed plan of care. Patient has provided input and agrees with goals.

## 2018-12-27 NOTE — PATIENT INSTRUCTIONS

## 2018-12-27 NOTE — PROGRESS NOTES
Patient is in the office today for 6 month follow up. 1. Have you been to the ER, urgent care clinic since your last visit? Hospitalized since your last visit? No    2. Have you seen or consulted any other health care providers outside of the [address] [place] [address] since your last visit? Include any pap smears or colon screening.  No

## 2018-12-27 NOTE — PROGRESS NOTES
Patient dropped off a Letter of Medical Necessity for Dr. Jenny Yoo to fill out. Patient was given a Form for Form to complete when she was informed that it may take 7-10 business days to have form completed. Form given to Nurse Edith Metcalf.

## 2018-12-31 ENCOUNTER — TELEPHONE (OUTPATIENT)
Dept: FAMILY MEDICINE CLINIC | Age: 53
End: 2018-12-31

## 2019-01-15 ENCOUNTER — DOCUMENTATION ONLY (OUTPATIENT)
Dept: FAMILY MEDICINE CLINIC | Age: 54
End: 2019-01-15

## 2019-02-18 RX ORDER — LISINOPRIL AND HYDROCHLOROTHIAZIDE 12.5; 2 MG/1; MG/1
TABLET ORAL
Qty: 90 TAB | Refills: 2 | Status: SHIPPED | OUTPATIENT
Start: 2019-02-18 | End: 2019-10-31 | Stop reason: SDUPTHER

## 2019-05-05 RX ORDER — SIMVASTATIN 20 MG/1
TABLET, FILM COATED ORAL
Qty: 90 TAB | Refills: 1 | Status: SHIPPED | OUTPATIENT
Start: 2019-05-05 | End: 2019-12-18

## 2019-07-08 ENCOUNTER — LAB ONLY (OUTPATIENT)
Dept: FAMILY MEDICINE CLINIC | Age: 54
End: 2019-07-08

## 2019-07-08 DIAGNOSIS — E78.00 PURE HYPERCHOLESTEROLEMIA: ICD-10-CM

## 2019-07-08 DIAGNOSIS — E55.9 VITAMIN D DEFICIENCY: ICD-10-CM

## 2019-07-08 DIAGNOSIS — I10 ESSENTIAL HYPERTENSION: Primary | ICD-10-CM

## 2019-07-15 DIAGNOSIS — I10 ESSENTIAL HYPERTENSION: ICD-10-CM

## 2019-07-15 DIAGNOSIS — E66.09 CLASS 1 OBESITY DUE TO EXCESS CALORIES WITH SERIOUS COMORBIDITY AND BODY MASS INDEX (BMI) OF 32.0 TO 32.9 IN ADULT: Primary | ICD-10-CM

## 2019-07-19 LAB
25(OH)D3 SERPL-MCNC: 41 NG/ML (ref 30–100)
ABSOLUTE BANDS, 67058: ABNORMAL
ABSOLUTE BLASTS: ABNORMAL
ABSOLUTE METAMYELOCYTES, 900360: ABNORMAL
ABSOLUTE MYELOCYTES: ABNORMAL
ABSOLUTE NRBC,ANRBC: ABNORMAL
ABSOLUTE PROMYELOCYTES: ABNORMAL
ALB/GLOBRATIO, 58C: 1.5 (CALC) (ref 1–2.5)
ALBUMIN SERPL-MCNC: 4.5 G/DL (ref 3.6–5.1)
ALP SERPL-CCNC: 66 U/L (ref 33–130)
ALT SERPL-CCNC: 26 U/L (ref 6–29)
AST SERPL W P-5'-P-CCNC: 25 U/L (ref 10–35)
BANDS,BANDS: ABNORMAL
BASOPHILS # BLD: 30 CELLS/UL (ref 0–200)
BASOPHILS NFR BLD: 0.9 %
BILIRUB SERPL-MCNC: 0.4 MG/DL (ref 0.2–1.2)
BLASTS,BLAST: ABNORMAL
BUN SERPL-MCNC: 15 MG/DL (ref 7–25)
BUN/CREATININE RATIO,BUCR: NORMAL (CALC) (ref 6–22)
CALCIUM SERPL-MCNC: 9.9 MG/DL (ref 8.6–10.4)
CHLORIDE SERPL-SCNC: 104 MMOL/L (ref 98–110)
CHOL/HDL RATIO,CHHDX: 3.2 (CALC)
CHOLEST SERPL-MCNC: 160 MG/DL
CO2 SERPL-SCNC: 23 MMOL/L (ref 20–32)
COMMENT(S): ABNORMAL
CREAT SERPL-MCNC: 1.05 MG/DL (ref 0.5–1.05)
EAG (MG/DL),9916804: 117 (CALC)
EAG (MMOL/L),9916805: 6.5 (CALC)
EOSINOPHIL # BLD: 40 CELLS/UL (ref 15–500)
EOSINOPHIL NFR BLD: 1.2 %
ERYTHROCYTE [DISTWIDTH] IN BLOOD BY AUTOMATED COUNT: 12.6 % (ref 11–15)
GLOBULIN,GLOB: 3 G/DL (CALC) (ref 1.9–3.7)
GLUCOSE SERPL-MCNC: 96 MG/DL (ref 65–99)
HBA1C MFR BLD HPLC: 5.7 % OF TOTAL HGB
HCT VFR BLD AUTO: 40.8 % (ref 35–45)
HDLC SERPL-MCNC: 50 MG/DL
HGB BLD-MCNC: 13.5 G/DL (ref 11.7–15.5)
LDL-CHOLESTEROL: 86 MG/DL (CALC)
LYMPHOCYTES # BLD: 1789 CELLS/UL (ref 850–3900)
LYMPHOCYTES NFR BLD: 54.2 %
MCH RBC QN AUTO: 30.8 PG (ref 27–33)
MCHC RBC AUTO-ENTMCNC: 33.1 G/DL (ref 32–36)
MCV RBC AUTO: 93.2 FL (ref 80–100)
METAMYELOCYTES,METAS: ABNORMAL
MONOCYTES # BLD: 337 CELLS/UL (ref 200–950)
MONOCYTES NFR BLD: 10.2 %
MYELOCYTES,MYELO: ABNORMAL
NEUTROPHILS # BLD AUTO: 1106 CELLS/UL (ref 1500–7800)
NEUTROPHILS # BLD: 33.5 %
NON-HDL CHOLESTEROL, 011976: 110 MG/DL (CALC)
NRBC: ABNORMAL
PLATELET # BLD AUTO: 279 THOUSAND/UL (ref 140–400)
PMV BLD AUTO: 11 FL (ref 7.5–12.5)
POTASSIUM SERPL-SCNC: 4.4 MMOL/L (ref 3.5–5.3)
PROMYELOCYTES,PRO: ABNORMAL
PROT SERPL-MCNC: 7.5 G/DL (ref 6.1–8.1)
RBC # BLD AUTO: 4.38 MILLION/UL (ref 3.8–5.1)
REACTIVE LYMPHS: ABNORMAL
SODIUM SERPL-SCNC: 139 MMOL/L (ref 135–146)
TRIGL SERPL-MCNC: 142 MG/DL (ref ?–150)
TSH SERPL DL<=0.005 MIU/L-ACNC: 0.71 MIU/L
WBC # BLD AUTO: 3.3 THOUSAND/UL (ref 3.8–10.8)

## 2019-07-23 ENCOUNTER — CLINICAL SUPPORT (OUTPATIENT)
Dept: FAMILY MEDICINE CLINIC | Age: 54
End: 2019-07-23

## 2019-07-23 VITALS
SYSTOLIC BLOOD PRESSURE: 108 MMHG | WEIGHT: 196.8 LBS | OXYGEN SATURATION: 98 % | HEIGHT: 65 IN | BODY MASS INDEX: 32.79 KG/M2 | HEART RATE: 61 BPM | DIASTOLIC BLOOD PRESSURE: 73 MMHG | RESPIRATION RATE: 18 BRPM | TEMPERATURE: 97.9 F

## 2019-07-23 DIAGNOSIS — Z02.1 ENCOUNTER FOR PRE-EMPLOYMENT EXAMINATION: Primary | ICD-10-CM

## 2019-07-23 LAB
MM INDURATION POC: 0 MM (ref 0–5)
PPD POC: NEGATIVE NEGATIVE

## 2019-07-23 NOTE — PROGRESS NOTES
PPD Placement note  Dov Cantrell, 46 y.o. female is here today for placement of PPD test  Reason for PPD test: Employment  Pt taken PPD test before: yes  Verified in allergy area and with patient that they are not allergic to the products PPD is made of (Phenol or Tween). No:   Is patient taking any oral or IV steroid medication now or have they taken it in the last month? no  Has the patient ever received the BCG vaccine?: no  Has the patient been in recent contact with anyone known or suspected of having active TB disease?: no       Date of exposure (if applicable): N/A       Name of person they were exposed to (if applicable): N/A  Patient's Country of origin?: [facility]            O: Alert and oriented in NAD. P:  PPD placed on 7/23/2019 at 8:15am.  Patient advised to return for reading within 48-72 hours.

## 2019-07-25 ENCOUNTER — OFFICE VISIT (OUTPATIENT)
Dept: FAMILY MEDICINE CLINIC | Age: 54
End: 2019-07-25

## 2019-07-25 VITALS
HEART RATE: 66 BPM | TEMPERATURE: 98.4 F | DIASTOLIC BLOOD PRESSURE: 80 MMHG | HEIGHT: 65 IN | WEIGHT: 193 LBS | RESPIRATION RATE: 20 BRPM | OXYGEN SATURATION: 95 % | BODY MASS INDEX: 32.15 KG/M2 | SYSTOLIC BLOOD PRESSURE: 123 MMHG

## 2019-07-25 DIAGNOSIS — I10 ESSENTIAL HYPERTENSION: Primary | ICD-10-CM

## 2019-07-25 DIAGNOSIS — E78.00 PURE HYPERCHOLESTEROLEMIA: ICD-10-CM

## 2019-07-25 DIAGNOSIS — E55.9 VITAMIN D DEFICIENCY: ICD-10-CM

## 2019-07-25 DIAGNOSIS — E66.09 CLASS 1 OBESITY DUE TO EXCESS CALORIES WITH SERIOUS COMORBIDITY AND BODY MASS INDEX (BMI) OF 32.0 TO 32.9 IN ADULT: ICD-10-CM

## 2019-07-25 RX ORDER — CLONIDINE 0.1 MG/24H
PATCH, EXTENDED RELEASE TRANSDERMAL
Qty: 12 PATCH | Refills: 3 | Status: SHIPPED | OUTPATIENT
Start: 2019-07-25 | End: 2020-07-19

## 2019-07-25 RX ORDER — METFORMIN HYDROCHLORIDE 500 MG/1
TABLET ORAL 2 TIMES DAILY WITH MEALS
COMMUNITY
End: 2020-02-07 | Stop reason: SDUPTHER

## 2019-07-25 NOTE — PATIENT INSTRUCTIONS
Hyperlipidemia: After Your Visit  Your Care Instructions  Hyperlipidemia is too much fat in your blood. The body has several kinds of fat, including cholesterol and triglycerides. Your body needs fat for many things, such as making new cells. But too much fat in your blood increases your chances of having a heart attack or stroke. You may be able to lower your cholesterol and triglycerides with a heart-healthy diet, exercise, and if needed, medicine. Your doctor may want you to try lifestyle changes first to see whether they lower the fat in your blood. You may need to take medicine if lifestyle changes do not lower the fat in your blood enough. Follow-up care is a key part of your treatment and safety. Be sure to make and go to all appointments, and call your doctor if you are having problems. Its also a good idea to know your test results and keep a list of the medicines you take. How can you care for yourself at home? Take your medicines  · Take your medicines exactly as prescribed. Call your doctor if you think you are having a problem with your medicine. · If you take medicine to lower your cholesterol, go to follow-up visits. You will need to have blood tests. · Do not take large doses of niacin, which is a B vitamin, while taking medicine called statins. It may increase the chance of muscle pain and liver problems. · Talk to your doctor about avoiding grapefruit juice if you are taking statins. Grapefruit juice can raise the level of this medicine in your blood. This could increase side effects. Eat more fruits, vegetables, and fiber  · Fruits and vegetables have lots of nutrients that help protect against heart disease, and they have little--if any--fat. Try to eat at least five servings a day. Dark green, deep orange, or yellow fruits and vegetables are healthy choices. · Keep carrots, celery, and other veggies handy for snacks.  Buy fruit that is in season and store it where you can see it so that you will be tempted to eat it. Cook dishes that have a lot of veggies in them, such as stir-fries and soups. · Foods high in fiber may reduce your cholesterol and provide important vitamins and minerals. High-fiber foods include whole-grain cereals and breads, oatmeal, beans, brown rice, citrus fruits, and apples. · Buy whole-grain breads and cereals instead of white bread and pastries. Limit saturated fat  · Read food labels and try to avoid saturated fat and trans fat. They increase your risk of heart disease. · Use olive or canola oil when you cook. Try cholesterol-lowering spreads, such as Benecol or Take Control. · Bake, broil, grill, or steam foods instead of frying them. · Limit the amount of high-fat meats you eat, including hot dogs and sausages. Cut out all visible fat when you prepare meat. · Eat fish, skinless poultry, and soy products such as tofu instead of high-fat meats. Soybeans may be especially good for your heart. Eat at least two servings of fish a week. Certain fish, such as salmon, contain omega-3 fatty acids, which may help reduce your risk of heart attack. · Choose low-fat or fat-free milk and dairy products. Get exercise, limit alcohol, and quit smoking  · Get more exercise. Work with your doctor to set up an exercise program. Even if you can do only a small amount, exercise will help you get stronger, have more energy, and manage your weight and your stress. Walking is an easy way to get exercise. Gradually increase the amount you walk every day. Aim for at least 30 minutes on most days of the week. You also may want to swim, bike, or do other activities. · Limit alcohol to no more than 2 drinks a day for men and 1 drink a day for women. · Do not smoke. If you need help quitting, talk to your doctor about stop-smoking programs and medicines. These can increase your chances of quitting for good. When should you call for help?   Call 911 anytime you think you may need emergency care. For example, call if:  · You have symptoms of a heart attack. These may include:  ¨ Chest pain or pressure, or a strange feeling in the chest.  ¨ Sweating. ¨ Shortness of breath. ¨ Nausea or vomiting. ¨ Pain, pressure, or a strange feeling in the back, neck, jaw, or upper belly or in one or both shoulders or arms. ¨ Lightheadedness or sudden weakness. ¨ A fast or irregular heartbeat. After you call 911, the  may tell you to chew 1 adult-strength or 2 to 4 low-dose aspirin. Wait for an ambulance. Do not try to drive yourself. · You have signs of a stroke. These may include:  ¨ Sudden numbness, paralysis, or weakness in your face, arm, or leg, especially on only one side of your body. ¨ New problems with walking or balance. ¨ Sudden vision changes. ¨ Drooling or slurred speech. ¨ New problems speaking or understanding simple statements, or feeling confused. ¨ A sudden, severe headache that is different from past headaches. · You passed out (lost consciousness). Call your doctor now or seek immediate medical care if:  · You have muscle pain or weakness. Watch closely for changes in your health, and be sure to contact your doctor if:  · You are very tired. · You have an upset stomach, gas, constipation, or belly pain or cramps. Where can you learn more? Go to [employer]  Enter C406 in the search box to learn more about \"Hyperlipidemia: After Your Visit. \"   © 5842-8369 Healthwise, Incorporated. Care instructions adapted under license by [facility] (which disclaims liability or warranty for this information). This care instruction is for use with your licensed healthcare professional. If you have questions about a medical condition or this instruction, always ask your healthcare professional. [place] [address] any warranty or liability for your use of this information.   Content Version: 4.8.118362; Last Revised: October 13, 2011                 Hip Flexor Strain: Rehab Exercises  Introduction  Here are some examples of exercises for you to try. The exercises may be suggested for a condition or for rehabilitation. Start each exercise slowly. Ease off the exercises if you start to have pain. You will be told when to start these exercises and which ones will work best for you. How to do the exercises  Pelvic tilt with marching    1. Lie on your back with your knees bent and your feet flat on the floor. 2. Tighten your belly muscles and buttocks, and press your lower back to the floor. 3. Keeping your knees bent, lift and then lower one leg up off the floor, and then lift and lower your other leg like you are marching. Each time you lift your leg, hold that position for about 6 seconds before lowering your leg. 4. Repeat 8 to 12 times. Scissors    1. Lie on your back with your knees bent at a 90-degree angle and your feet off the floor. 2. Tighten your belly muscles and buttocks, and press your lower back to the floor. 3. Slowly straighten one leg, and hold that position for about 6 seconds. Your leg should be about 12 inches off the floor. Bring that leg back to the starting position, and then straighten your other leg. Hold that position for about 6 seconds, and then switch legs again. 4. Repeat 8 to 12 times. Hamstring stretch (lying down)    1. Lie flat on your back with your legs straight. If you feel discomfort in your back, place a small towel roll under your lower back. 2. Holding the back of your affected leg for support, lift your leg straight up and toward your body until you feel a stretch at the back of your thigh. 3. Hold the stretch for at least 30 seconds. 4. Repeat 2 to 4 times. Quadricep and hip flexor stretch (lying on side)    1. Lie on your side with your good leg flat on the floor and your hand supporting your head.   2. Bend your top leg, and reach behind you to grab the front of that foot or ankle with your other hand. 3. Stretch your leg back by pulling your foot toward your buttock. You will feel the stretch in the front of your thigh. If this causes stress on your knee, do not do this stretch. 4. Hold the stretch for at least 15 to 30 seconds. 5. Repeat 2 to 4 times. Hip flexor stretch (kneeling)    1. Kneel on your affected leg and bend your good leg out in front of you, with that foot flat on the floor. If you feel discomfort in the front of your knee, place a towel under your knee. 2. Keeping your back straight, slowly push your hips forward until you feel a stretch in the upper thigh of your back leg and hip. 3. Hold the stretch for at least 15 to 30 seconds. 4. Repeat 2 to 4 times. Hip flexor stretch (edge of table)    1. Lie flat on your back on a table or flat bench, with your knees and lower legs hanging off the edge of the table. 2. Grab your good leg at the knee, and pull that knee back toward your chest. Relax your affected leg and let it hang down toward the floor until you feel a stretch in the upper thigh of your affected leg and hip. 3. Hold the stretch for at least 15 to 30 seconds. 4. Repeat 2 to 4 times. Follow-up care is a key part of your treatment and safety. Be sure to make and go to all appointments, and call your doctor if you are having problems. It's also a good idea to know your test results and keep a list of the medicines you take. Where can you learn more? Go to http://libby-orlando.info/. Enter X430 in the search box to learn more about \"Hip Flexor Strain: Rehab Exercises. \"  Current as of: September 20, 2018  Content Version: 12.1  © 7705-8247 Healthwise, Incorporated. Care instructions adapted under license by [employer] (which disclaims liability or warranty for this information).  If you have questions about a medical condition or this instruction, always ask your healthcare professional. [place] [address] any warranty or liability for your use of this information.

## 2019-07-25 NOTE — PROGRESS NOTES
Subjective:       Chief Complaint  The patient presents for follow up of hypertension and high cholesterol. Vit D deficiency         HPI  Katherine Washington is a 46 y.o. female seen for follow up of hyperlipidemia. Shealso has hypertension. Hypertension well controlled, no significant medication side effects noted, on catapres which is also for hot flashes and zestoretic , hyperlipidemia well controlled on Zocor 20 mg . LDL is 86, no significant medication side effects noted, on Zocor. Pt continues to struggle with with losing weight. She just started a weight loss program. Gave her information on intermittent fasting and ketogenic diet so she can see which one works better for her. She has mild prediabeties which she will try to control with diet and weight loss. .     Diet and Lifestyle: generally follows a low fat low cholesterol diet, exercises sporadically    Home BP Monitoring: is not measured at home. Other symptoms and concerns: vit D level is well controlled on  prenatal vitamin and vit D 2000 units/day. Current Outpatient Medications   Medication Sig    cloNIDine (CATAPRES) 0.1 mg/24 hr ptwk APPLY 1 PATCH TO SKIN AND CHANGE EVERY 7 DAYS    metFORMIN (GLUCOPHAGE) 500 mg tablet Take  by mouth two (2) times daily (with meals).  simvastatin (ZOCOR) 20 mg tablet TAKE 1 TABLET BY MOUTH EVERYDAY AT BEDTIME    lisinopril-hydroCHLOROthiazide (PRINZIDE, ZESTORETIC) 20-12.5 mg per tablet TAKE 1 TABLET BY MOUTH EVERY DAY    omeprazole (PRILOSEC) 40 mg capsule Take 40 mg by mouth daily.  levalbuterol tartrate (XOPENEX) 45 mcg/actuation inhaler INHALE 1-2 PUFFS EVERY 4 TO 6 HOURS AS NEEDED    YUVAFEM 10 mcg tab vaginal tablet INSERT 1 APPLICATORFUL VAGINALLY AT BEDTIME FOR 14 DAYS THEN TWICE PER WEEK    LACTOBACILLUS ACIDOPHILUS (PROBIOTIC PO) Take  by mouth.  OTHER alestrian hormone cream    OTHER Tumeric 1 cap daily    ranitidine (ZANTAC) 300 mg tablet Take 300 mg by mouth daily as needed.  CLINDESSE 2 % SR vaginal cream     MILK THISTLE PO Take 1 Cap by mouth daily.  vitamin e 400 unit tab Take 1 Cap by mouth daily.  Cholecalciferol, Vitamin D3, (VITAMIN D) 2,000 unit cap capsule Take 2,000 Units by mouth daily.  cpap machine kit by Does Not Apply route. Replace mask, headgear, heated hose, filters and accessories as needed for one year. CPAP pressure: 9 cwp  Mask: standard Mirage FX plus Opus nasal pillows mask, or mask of choice  [employer]: [employer]  Download data 30 days after initiation of therapy    cyclobenzaprine (FLEXERIL) 5 mg tablet TAKE 1-2 TABLETS BY MOUTH AT NIGHT.  meclizine (ANTIVERT) 25 mg tablet TAKE 1 TAB BY MOUTH THREE (3) TIMES DAILY AS NEEDED FOR UP TO 10 DAYS.  valACYclovir (VALTREX) 500 mg tablet Take 500 mg by mouth daily. No current facility-administered medications for this visit.               Review of Systems  Respiratory: negative for dyspnea on exertion  Cardiovascular: negative for chest pain    Objective:     Visit Vitals  /80 (BP 1 Location: Left arm, BP Patient Position: Sitting)   Pulse 66   Temp 98.4 °F (36.9 °C) (Oral)   Resp 20   Ht 5' 5\" (1.651 m)   Wt 193 lb (87.5 kg)   SpO2 95%   BMI 32.12 kg/m²        General appearance - alert, well appearing, and in no distress  Chest - clear to auscultation, no wheezes, rales or rhonchi, symmetric air entry  Heart - normal rate, regular rhythm, normal S1, S2, no murmurs, rubs, clicks or gallops  Extremities - peripheral pulses normal, no pedal edema, no clubbing or cyanosis  Skin - normal coloration and turgor, no rashes, no suspicious skin lesions noted      Labs:   Lab Results   Component Value Date/Time    Cholesterol, total 160 07/18/2019 08:56 AM    HDL Cholesterol 50 (L) 07/18/2019 08:56 AM    LDL-CHOLESTEROL 86 07/18/2019 08:56 AM    LDL, calculated 101 (H) 08/12/2015 09:00 AM    LDL-C, External 96 04/23/2015    Triglyceride 142 07/18/2019 08:56 AM    Cholesterol/HDL ratio 3.2 07/18/2019 08:56 AM     Lab Results   Component Value Date/Time    Sodium 139 07/18/2019 08:56 AM    Potassium 4.4 07/18/2019 08:56 AM    Chloride 104 07/18/2019 08:56 AM    CO2 23 07/18/2019 08:56 AM    Anion gap 5 10/18/2016 05:28 PM    Glucose 96 07/18/2019 08:56 AM    BUN 15 07/18/2019 08:56 AM    Creatinine 1.05 07/18/2019 08:56 AM    BUN/Creatinine ratio NOT APPLICABLE 95/22/4558 92:68 AM    GFR est AA 70 07/18/2019 08:56 AM    GFR est non-AA 60 07/18/2019 08:56 AM    Calcium 9.9 07/18/2019 08:56 AM    Bilirubin, total 0.4 07/18/2019 08:56 AM    ALT (SGPT) 26 07/18/2019 08:56 AM    AST (SGOT) 25 07/18/2019 08:56 AM    Alk. phosphatase 66 07/18/2019 08:56 AM    Protein, total 7.5 07/18/2019 08:56 AM    Albumin 4.5 07/18/2019 08:56 AM    Globulin 3.0 07/18/2019 08:56 AM    A-G Ratio 1.1 10/18/2016 05:28 PM        Labs:   Lab Results   Component Value Date/Time    WBC 3.3 (L) 07/18/2019 08:56 AM    HGB 13.5 07/18/2019 08:56 AM    HCT 40.8 07/18/2019 08:56 AM    PLATELET 807 51/10/4384 08:56 AM    MCV 93.2 07/18/2019 08:56 AM     Lab Results   Component Value Date/Time    Hemoglobin A1c 5.7 (H) 07/18/2019 08:56 AM    Hemoglobin A1c, External 5.8 11/11/2016            Assessment / Plan     Hypertension well controlled, on current meds  Hyperlipidemia fairly well controlled, on Zocor. Will try to improve it further with diet and weight loss. ICD-10-CM ICD-9-CM    1. Essential hypertension M88 924.7 METABOLIC PANEL, COMPREHENSIVE   2.  Pure hypercholesterolemia E78.00 272.0 LIPID PANEL   3. Vitamin D deficiency E55.9 268.9 Well controlled on vit D 2000 units/day VITAMIN D, 25 HYDROXY   4. 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 Follow-up and Dispositions    · Return in about 6 months (around 1/25/2020) for labs 1 week before. Reviewed plan of care. Patient has provided input and agrees with goals.

## 2019-07-25 NOTE — PROGRESS NOTES
Patient is in the office today for 6 month follow up. 1. Have you been to the ER, urgent care clinic since your last visit? Hospitalized since your last visit? No    2. Have you seen or consulted any other health care providers outside of the [address] [place] [address] since your last visit? Include any pap smears or colon screening.  No

## 2019-10-31 RX ORDER — LISINOPRIL AND HYDROCHLOROTHIAZIDE 12.5; 2 MG/1; MG/1
TABLET ORAL
Qty: 90 TAB | Refills: 2 | Status: SHIPPED | OUTPATIENT
Start: 2019-10-31 | End: 2020-06-26 | Stop reason: SDUPTHER

## 2019-12-18 RX ORDER — SIMVASTATIN 20 MG/1
TABLET, FILM COATED ORAL
Qty: 90 TAB | Refills: 1 | Status: SHIPPED | OUTPATIENT
Start: 2019-12-18 | End: 2020-06-05

## 2020-01-29 DIAGNOSIS — E78.00 PURE HYPERCHOLESTEROLEMIA: ICD-10-CM

## 2020-01-29 DIAGNOSIS — I10 ESSENTIAL HYPERTENSION: Primary | ICD-10-CM

## 2020-01-29 DIAGNOSIS — E55.9 VITAMIN D DEFICIENCY: ICD-10-CM

## 2020-01-29 DIAGNOSIS — R73.03 PREDIABETES: ICD-10-CM

## 2020-02-07 ENCOUNTER — OFFICE VISIT (OUTPATIENT)
Dept: FAMILY MEDICINE CLINIC | Age: 55
End: 2020-02-07

## 2020-02-07 VITALS
TEMPERATURE: 98.6 F | OXYGEN SATURATION: 97 % | WEIGHT: 197 LBS | BODY MASS INDEX: 32.82 KG/M2 | HEART RATE: 77 BPM | SYSTOLIC BLOOD PRESSURE: 136 MMHG | RESPIRATION RATE: 20 BRPM | DIASTOLIC BLOOD PRESSURE: 75 MMHG | HEIGHT: 65 IN

## 2020-02-07 DIAGNOSIS — R73.03 PREDIABETES: ICD-10-CM

## 2020-02-07 DIAGNOSIS — E55.9 VITAMIN D DEFICIENCY: ICD-10-CM

## 2020-02-07 DIAGNOSIS — E78.00 PURE HYPERCHOLESTEROLEMIA: ICD-10-CM

## 2020-02-07 DIAGNOSIS — E89.40 SURGICAL MENOPAUSE: ICD-10-CM

## 2020-02-07 DIAGNOSIS — I10 ESSENTIAL HYPERTENSION: Primary | ICD-10-CM

## 2020-02-07 RX ORDER — METFORMIN HYDROCHLORIDE 500 MG/1
500 TABLET ORAL 2 TIMES DAILY WITH MEALS
Qty: 60 TAB | Refills: 5 | Status: SHIPPED | OUTPATIENT
Start: 2020-02-07 | End: 2020-02-07 | Stop reason: SDUPTHER

## 2020-02-07 RX ORDER — METFORMIN HYDROCHLORIDE 500 MG/1
500 TABLET ORAL 2 TIMES DAILY WITH MEALS
Qty: 180 TAB | Refills: 3 | Status: SHIPPED | OUTPATIENT
Start: 2020-02-07 | End: 2021-07-16 | Stop reason: CLARIF

## 2020-02-07 NOTE — PROGRESS NOTES
Subjective:       Chief Complaint  The patient presents for follow up of hypertension and high cholesterol. Vit D deficiency         HPI  Daylin Mejía is a 53 y.o. female seen for follow up of hyperlipidemia. Shealso has hypertension. Hypertension well controlled, no significant medication side effects noted, on catapres which is also for hot flashes and zestoretic , hyperlipidemia well controlled on Zocor 20 mg . LDL is 94, no significant medication side effects noted, on Zocor 20 mg. Pt continues to struggle with with losing weight. She just started a weight loss program. Gave her information on intermittent fasting and ketogenic diet so she can see which one works better for her. She has mild prediabeties which she will try to control with diet and weight loss. .     Diet and Lifestyle: generally follows a low fat low cholesterol diet, exercises sporadically    Home BP Monitoring: is not measured at home. Other symptoms and concerns: vit D level is well controlled on  prenatal vitamin and vit D 2000 units/day. Current Outpatient Medications   Medication Sig    metFORMIN (GLUCOPHAGE) 500 mg tablet Take 1 Tab by mouth two (2) times daily (with meals).  simvastatin (ZOCOR) 20 mg tablet TAKE 1 TABLET BY MOUTH EVERYDAY AT BEDTIME    lisinopril-hydroCHLOROthiazide (PRINZIDE, ZESTORETIC) 20-12.5 mg per tablet TAKE 1 TABLET BY MOUTH EVERY DAY    cloNIDine (CATAPRES) 0.1 mg/24 hr ptwk APPLY 1 PATCH TO SKIN AND CHANGE EVERY 7 DAYS    omeprazole (PRILOSEC) 40 mg capsule Take 40 mg by mouth daily.  levalbuterol tartrate (XOPENEX) 45 mcg/actuation inhaler INHALE 1-2 PUFFS EVERY 4 TO 6 HOURS AS NEEDED    YUVAFEM 10 mcg tab vaginal tablet INSERT 1 APPLICATORFUL VAGINALLY AT BEDTIME FOR 14 DAYS THEN TWICE PER WEEK    LACTOBACILLUS ACIDOPHILUS (PROBIOTIC PO) Take  by mouth.     OTHER alestrian hormone cream    OTHER Tumeric 1 cap daily    CLINDESSE 2 % SR vaginal cream     MILK THISTLE PO Take 1 Cap by mouth daily.  vitamin e 400 unit tab Take 1 Cap by mouth daily.  Cholecalciferol, Vitamin D3, (VITAMIN D) 2,000 unit cap capsule Take 2,000 Units by mouth daily.  cpap machine kit by Does Not Apply route. Replace mask, headgear, heated hose, filters and accessories as needed for one year. CPAP pressure: 9 cwp  Mask: standard Mirage FX plus Opus nasal pillows mask, or mask of choice  [facility]: MED  Download data 30 days after initiation of therapy    cyclobenzaprine (FLEXERIL) 5 mg tablet TAKE 1-2 TABLETS BY MOUTH AT NIGHT.  meclizine (ANTIVERT) 25 mg tablet TAKE 1 TAB BY MOUTH THREE (3) TIMES DAILY AS NEEDED FOR UP TO 10 DAYS.  valACYclovir (VALTREX) 500 mg tablet Take 500 mg by mouth daily.  ranitidine (ZANTAC) 300 mg tablet Take 300 mg by mouth daily as needed. No current facility-administered medications for this visit.               Review of Systems  Respiratory: negative for dyspnea on exertion  Cardiovascular: negative for chest pain    Objective:     Visit Vitals  /75 (BP 1 Location: Left arm, BP Patient Position: Sitting)   Pulse 77   Temp 98.6 °F (37 °C) (Oral)   Resp 20   Ht 5' 5\" (1.651 m)   Wt 197 lb (89.4 kg)   SpO2 97%   BMI 32.78 kg/m²        General appearance - alert, well appearing, and in no distress  Chest - clear to auscultation, no wheezes, rales or rhonchi, symmetric air entry  Heart - normal rate, regular rhythm, normal S1, S2, no murmurs, rubs, clicks or gallops  Extremities - peripheral pulses normal, no pedal edema, no clubbing or cyanosis  Skin - normal coloration and turgor, no rashes, no suspicious skin lesions noted      Labs:   Lab Results   Component Value Date/Time    Cholesterol, total 164 01/29/2020 09:48 AM    HDL Cholesterol 49 (L) 01/29/2020 09:48 AM    LDL-CHOLESTEROL 94 01/29/2020 09:48 AM    LDL, calculated 101 (H) 08/12/2015 09:00 AM    LDL-C, External 96 04/23/2015    Triglyceride 109 01/29/2020 09:48 AM    Cholesterol/HDL ratio 3.3 01/29/2020 09:48 AM     Lab Results   Component Value Date/Time    Sodium 140 01/29/2020 09:48 AM    Potassium 4.6 01/29/2020 09:48 AM    Chloride 103 01/29/2020 09:48 AM    CO2 27 01/29/2020 09:48 AM    Anion gap 5 10/18/2016 05:28 PM    Glucose 93 01/29/2020 09:48 AM    BUN 9 01/29/2020 09:48 AM    Creatinine 0.94 01/29/2020 09:48 AM    BUN/Creatinine ratio NOT APPLICABLE 34/65/1114 05:62 AM    GFR est AA 79 01/29/2020 09:48 AM    GFR est non-AA 68 01/29/2020 09:48 AM    Calcium 10.3 01/29/2020 09:48 AM    Bilirubin, total 0.5 01/29/2020 09:48 AM    ALT (SGPT) 30 (H) 01/29/2020 09:48 AM    AST (SGOT) 25 01/29/2020 09:48 AM    Alk. phosphatase 75 01/29/2020 09:48 AM    Protein, total 7.6 01/29/2020 09:48 AM    Albumin 4.5 01/29/2020 09:48 AM    Globulin 3.1 01/29/2020 09:48 AM    A-G Ratio 1.1 10/18/2016 05:28 PM        Labs:   Lab Results   Component Value Date/Time    WBC 3.3 (L) 07/18/2019 08:56 AM    HGB 13.5 07/18/2019 08:56 AM    HCT 40.8 07/18/2019 08:56 AM    PLATELET 591 84/51/8808 08:56 AM    MCV 93.2 07/18/2019 08:56 AM     Lab Results   Component Value Date/Time    Hemoglobin A1c 5.6 01/29/2020 09:48 AM    Hemoglobin A1c, External 5.8 11/11/2016            Assessment / Plan     Hypertension well controlled, on current meds  Hyperlipidemia fairly well controlled, on Zocor. Will try to improve it further with diet and weight loss. ICD-10-CM ICD-9-CM    1. Essential hypertension N81 700.3 METABOLIC PANEL, COMPREHENSIVE   2. Pure hypercholesterolemia E78.00 272.0 LIPID PANEL   3. Vitamin D deficiency E55.9 268.9 Well controlled on vit D 2000 units/day VITAMIN D, 25 HYDROXY   4. Surgical menopause E89.40 627.4 DEXA BONE DENSITY STUDY AXIAL   5. Prediabetes R73.03 790.29 Improving with diet and weight loss.  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 Follow-up and Dispositions    · Return in about 6 months (around 8/7/2020) for labs 1 week before. Reviewed plan of care. Patient has provided input and agrees with goals.

## 2020-02-07 NOTE — PROGRESS NOTES
Patient is in the office today for 6 month follow up. 1. Have you been to the ER, urgent care clinic since your last visit? Hospitalized since your last visit? No    2. Have you seen or consulted any other health care providers outside of the [address] [place] [address] since your last visit? Include any pap smears or colon screening.  No

## 2020-02-07 NOTE — PATIENT INSTRUCTIONS
Hyperlipidemia: After Your Visit  Your Care Instructions  Hyperlipidemia is too much fat in your blood. The body has several kinds of fat, including cholesterol and triglycerides. Your body needs fat for many things, such as making new cells. But too much fat in your blood increases your chances of having a heart attack or stroke. You may be able to lower your cholesterol and triglycerides with a heart-healthy diet, exercise, and if needed, medicine. Your doctor may want you to try lifestyle changes first to see whether they lower the fat in your blood. You may need to take medicine if lifestyle changes do not lower the fat in your blood enough. Follow-up care is a key part of your treatment and safety. Be sure to make and go to all appointments, and call your doctor if you are having problems. Its also a good idea to know your test results and keep a list of the medicines you take. How can you care for yourself at home? Take your medicines  · Take your medicines exactly as prescribed. Call your doctor if you think you are having a problem with your medicine. · If you take medicine to lower your cholesterol, go to follow-up visits. You will need to have blood tests. · Do not take large doses of niacin, which is a B vitamin, while taking medicine called statins. It may increase the chance of muscle pain and liver problems. · Talk to your doctor about avoiding grapefruit juice if you are taking statins. Grapefruit juice can raise the level of this medicine in your blood. This could increase side effects. Eat more fruits, vegetables, and fiber  · Fruits and vegetables have lots of nutrients that help protect against heart disease, and they have little--if any--fat. Try to eat at least five servings a day. Dark green, deep orange, or yellow fruits and vegetables are healthy choices. · Keep carrots, celery, and other veggies handy for snacks.  Buy fruit that is in season and store it where you can see it so that you will be tempted to eat it. Cook dishes that have a lot of veggies in them, such as stir-fries and soups. · Foods high in fiber may reduce your cholesterol and provide important vitamins and minerals. High-fiber foods include whole-grain cereals and breads, oatmeal, beans, brown rice, citrus fruits, and apples. · Buy whole-grain breads and cereals instead of white bread and pastries. Limit saturated fat  · Read food labels and try to avoid saturated fat and trans fat. They increase your risk of heart disease. · Use olive or canola oil when you cook. Try cholesterol-lowering spreads, such as Benecol or Take Control. · Bake, broil, grill, or steam foods instead of frying them. · Limit the amount of high-fat meats you eat, including hot dogs and sausages. Cut out all visible fat when you prepare meat. · Eat fish, skinless poultry, and soy products such as tofu instead of high-fat meats. Soybeans may be especially good for your heart. Eat at least two servings of fish a week. Certain fish, such as salmon, contain omega-3 fatty acids, which may help reduce your risk of heart attack. · Choose low-fat or fat-free milk and dairy products. Get exercise, limit alcohol, and quit smoking  · Get more exercise. Work with your doctor to set up an exercise program. Even if you can do only a small amount, exercise will help you get stronger, have more energy, and manage your weight and your stress. Walking is an easy way to get exercise. Gradually increase the amount you walk every day. Aim for at least 30 minutes on most days of the week. You also may want to swim, bike, or do other activities. · Limit alcohol to no more than 2 drinks a day for men and 1 drink a day for women. · Do not smoke. If you need help quitting, talk to your doctor about stop-smoking programs and medicines. These can increase your chances of quitting for good. When should you call for help?   Call 911 anytime you think you may need emergency care. For example, call if:  · You have symptoms of a heart attack. These may include:  ¨ Chest pain or pressure, or a strange feeling in the chest.  ¨ Sweating. ¨ Shortness of breath. ¨ Nausea or vomiting. ¨ Pain, pressure, or a strange feeling in the back, neck, jaw, or upper belly or in one or both shoulders or arms. ¨ Lightheadedness or sudden weakness. ¨ A fast or irregular heartbeat. After you call 911, the  may tell you to chew 1 adult-strength or 2 to 4 low-dose aspirin. Wait for an ambulance. Do not try to drive yourself. · You have signs of a stroke. These may include:  ¨ Sudden numbness, paralysis, or weakness in your face, arm, or leg, especially on only one side of your body. ¨ New problems with walking or balance. ¨ Sudden vision changes. ¨ Drooling or slurred speech. ¨ New problems speaking or understanding simple statements, or feeling confused. ¨ A sudden, severe headache that is different from past headaches. · You passed out (lost consciousness). Call your doctor now or seek immediate medical care if:  · You have muscle pain or weakness. Watch closely for changes in your health, and be sure to contact your doctor if:  · You are very tired. · You have an upset stomach, gas, constipation, or belly pain or cramps. Where can you learn more? Go to ethority.be  Enter C406 in the search box to learn more about \"Hyperlipidemia: After Your Visit. \"   © 7351-2111 Healthwise, Incorporated. Care instructions adapted under license by [employer] (which disclaims liability or warranty for this information). This care instruction is for use with your licensed healthcare professional. If you have questions about a medical condition or this instruction, always ask your healthcare professional. [place] [address] any warranty or liability for your use of this information.   Content Version: 6.1.268796; Last Revised: October 13, 2011

## 2020-03-03 ENCOUNTER — OFFICE VISIT (OUTPATIENT)
Dept: FAMILY MEDICINE CLINIC | Age: 55
End: 2020-03-03

## 2020-03-03 VITALS
RESPIRATION RATE: 16 BRPM | SYSTOLIC BLOOD PRESSURE: 119 MMHG | HEART RATE: 73 BPM | DIASTOLIC BLOOD PRESSURE: 74 MMHG | TEMPERATURE: 98 F | OXYGEN SATURATION: 98 % | BODY MASS INDEX: 31.79 KG/M2 | HEIGHT: 65 IN | WEIGHT: 190.8 LBS

## 2020-03-03 DIAGNOSIS — J01.00 ACUTE NON-RECURRENT MAXILLARY SINUSITIS: Primary | ICD-10-CM

## 2020-03-03 RX ORDER — LEVALBUTEROL TARTRATE 45 UG/1
AEROSOL, METERED ORAL
Qty: 1 INHALER | Refills: 0 | Status: SHIPPED | OUTPATIENT
Start: 2020-03-03 | End: 2020-07-27

## 2020-03-03 RX ORDER — PREDNISONE 20 MG/1
TABLET ORAL
Qty: 10 TAB | Refills: 0 | Status: SHIPPED | OUTPATIENT
Start: 2020-03-03 | End: 2020-05-04 | Stop reason: ALTCHOICE

## 2020-03-03 RX ORDER — CEFUROXIME AXETIL 500 MG/1
500 TABLET ORAL 2 TIMES DAILY
Qty: 20 TAB | Refills: 0 | Status: SHIPPED | OUTPATIENT
Start: 2020-03-03 | End: 2020-06-22 | Stop reason: CLARIF

## 2020-03-03 NOTE — PROGRESS NOTES
HISTORY OF PRESENT ILLNESS  Chavez Grossman is a 53 y.o. female. Patient presents for cold symptoms. HPI  Symptoms startd 4 weeks ago. She has been taking OTC Chlorcidin which has helped some. But yesterday she started getting a burning sensation in her chest when she cough. The sputum production is milky white to tan. Review of Systems   Constitutional: Negative. HENT: Positive for congestion, sinus pain and sore throat. Respiratory: Positive for cough and sputum production. Negative for shortness of breath and wheezing. Cardiovascular: Negative. Visit Vitals  /74 (BP 1 Location: Left arm)   Pulse 73   Temp 98 °F (36.7 °C) (Oral)   Resp 16   Ht 5' 5\" (1.651 m)   Wt 190 lb 12.8 oz (86.5 kg)   SpO2 98%   BMI 31.75 kg/m²     Physical Exam  Constitutional:       General: She is not in acute distress. Appearance: Normal appearance. She is not ill-appearing. HENT:      Right Ear: A middle ear effusion is present. Tympanic membrane is injected. Left Ear: A middle ear effusion is present. Tympanic membrane is injected and retracted. Mouth/Throat:      Pharynx: Posterior oropharyngeal erythema present. Cardiovascular:      Rate and Rhythm: Normal rate and regular rhythm. Pulmonary:      Effort: Pulmonary effort is normal. No respiratory distress. Breath sounds: Normal breath sounds. No stridor. No wheezing, rhonchi or rales. Neurological:      Mental Status: She is alert. ASSESSMENT and PLAN    ICD-10-CM ICD-9-CM    1. Acute non-recurrent maxillary sinusitis J01.00 461.0 cefUROXime (CEFTIN) 500 mg tablet     PLAN:  Pt is to call if symptoms persist or worsen. I have discussed the diagnosis with the patient and the intended plan as seen in the above orders. The patient has received an after-visit summary and questions were answered concerning future plans. I have discussed medication side effects and warnings with the patient as well.  Patient will call for further questions. Follow-up and Dispositions    · Return if symptoms worsen or fail to improve.

## 2020-03-03 NOTE — PROGRESS NOTES
Pt is here for cough, congestion & headaches x 4 weeks    1. Have you been to the ER, urgent care clinic since your last visit? Hospitalized since your last visit? No    2. Have you seen or consulted any other health care providers outside of the [address] [place] [address] since your last visit? Include any pap smears or colon screening.  No

## 2020-03-18 ENCOUNTER — OFFICE VISIT (OUTPATIENT)
Dept: FAMILY MEDICINE CLINIC | Age: 55
End: 2020-03-18

## 2020-03-18 VITALS
BODY MASS INDEX: 31.65 KG/M2 | DIASTOLIC BLOOD PRESSURE: 82 MMHG | OXYGEN SATURATION: 98 % | WEIGHT: 190 LBS | HEIGHT: 65 IN | RESPIRATION RATE: 20 BRPM | TEMPERATURE: 98.2 F | SYSTOLIC BLOOD PRESSURE: 117 MMHG | HEART RATE: 73 BPM

## 2020-03-18 DIAGNOSIS — J30.1 SEASONAL ALLERGIC RHINITIS DUE TO POLLEN: Primary | ICD-10-CM

## 2020-03-18 RX ORDER — MONTELUKAST SODIUM 10 MG/1
10 TABLET ORAL DAILY
Qty: 30 TAB | Refills: 5 | Status: SHIPPED | OUTPATIENT
Start: 2020-03-18 | End: 2020-06-22 | Stop reason: CLARIF

## 2020-03-18 RX ORDER — FLUTICASONE PROPIONATE 50 MCG
2 SPRAY, SUSPENSION (ML) NASAL DAILY
Qty: 1 BOTTLE | Refills: 5 | Status: SHIPPED | OUTPATIENT
Start: 2020-03-18 | End: 2021-04-15 | Stop reason: SDUPTHER

## 2020-03-18 NOTE — PROGRESS NOTES
Patient is in the office today for sinus infection    1. Have you been to the ER, urgent care clinic since your last visit? Hospitalized since your last visit? No    2. Have you seen or consulted any other health care providers outside of the [address] [place] [address] since your last visit? Include any pap smears or colon screening.  No

## 2020-03-18 NOTE — PATIENT INSTRUCTIONS
Sinusitis: Care Instructions  Your Care Instructions    Sinusitis is an infection of the lining of the sinus cavities in your head. Sinusitis often follows a cold. It causes pain and pressure in your head and face. In most cases, sinusitis gets better on its own in 1 to 2 weeks. But some mild symptoms may last for several weeks. Sometimes antibiotics are needed. Follow-up care is a key part of your treatment and safety. Be sure to make and go to all appointments, and call your doctor if you are having problems. It's also a good idea to know your test results and keep a list of the medicines you take. How can you care for yourself at home? · Take an over-the-counter pain medicine, such as acetaminophen (Tylenol), ibuprofen (Advil, Motrin), or naproxen (Aleve). Read and follow all instructions on the label. · If the doctor prescribed antibiotics, take them as directed. Do not stop taking them just because you feel better. You need to take the full course of antibiotics. · Be careful when taking over-the-counter cold or flu medicines and Tylenol at the same time. Many of these medicines have acetaminophen, which is Tylenol. Read the labels to make sure that you are not taking more than the recommended dose. Too much acetaminophen (Tylenol) can be harmful. · Breathe warm, moist air from a steamy shower, a hot bath, or a sink filled with hot water. Avoid cold, dry air. Using a humidifier in your home may help. Follow the directions for cleaning the machine. · Use saline (saltwater) nasal washes to help keep your nasal passages open and wash out mucus and bacteria. You can buy saline nose drops at a grocery store or drugstore. Or you can make your own at home by adding 1 teaspoon of salt and 1 teaspoon of baking soda to 2 cups of distilled water. If you make your own, fill a bulb syringe with the solution, insert the tip into your nostril, and squeeze gently. Thena Number your nose.   · Put a hot, wet towel or a warm gel pack on your face 3 or 4 times a day for 5 to 10 minutes each time. · Try a decongestant nasal spray like oxymetazoline (Afrin). Do not use it for more than 3 days in a row. Using it for more than 3 days can make your congestion worse. When should you call for help? Call your doctor now or seek immediate medical care if:    · You have new or worse swelling or redness in your face or around your eyes.     · You have a new or higher fever.    Watch closely for changes in your health, and be sure to contact your doctor if:    · You have new or worse facial pain.     · The mucus from your nose becomes thicker (like pus) or has new blood in it.     · You are not getting better as expected. Where can you learn more? Go to http://libby-orlando.info/  Enter A034 in the search box to learn more about \"Sinusitis: Care Instructions. \"  Current as of: July 28, 2019Content Version: 12.4  © 8181-5170 Healthwise, Incorporated. Care instructions adapted under license by [employer] (which disclaims liability or warranty for this information). If you have questions about a medical condition or this instruction, always ask your healthcare professional. [address] any warranty or liability for your use of this information.

## 2020-03-24 NOTE — PROGRESS NOTES
Chapo Dutton is a 53 y.o.  female and presents with Sinus Infection and Allergic Rhinitis      SUBJECTIVE:    Allergic Rhinitis  Patient presents for evaluation of allergic symptoms. Symptoms include nasal congestion, rhinorrhea, cough and are present in a seasonal pattern. Precipitants include pollen. Treatment in the past has included Deftin given for recent sinus infection. Treatment currently includes nothing and is not effective in the patient's opinion. Respiratory ROS: negative for - shortness of breath  Cardiovascular ROS: negative for - chest pain    Current Outpatient Medications   Medication Sig    fluticasone propionate (FLONASE) 50 mcg/actuation nasal spray 2 Sprays by Both Nostrils route daily.  montelukast (Singulair) 10 mg tablet Take 1 Tab by mouth daily.  metFORMIN (GLUCOPHAGE) 500 mg tablet Take 1 Tab by mouth two (2) times daily (with meals).  simvastatin (ZOCOR) 20 mg tablet TAKE 1 TABLET BY MOUTH EVERYDAY AT BEDTIME    lisinopril-hydroCHLOROthiazide (PRINZIDE, ZESTORETIC) 20-12.5 mg per tablet TAKE 1 TABLET BY MOUTH EVERY DAY    cloNIDine (CATAPRES) 0.1 mg/24 hr ptwk APPLY 1 PATCH TO SKIN AND CHANGE EVERY 7 DAYS    omeprazole (PRILOSEC) 40 mg capsule Take 40 mg by mouth daily.  YUVAFEM 10 mcg tab vaginal tablet INSERT 1 APPLICATORFUL VAGINALLY AT BEDTIME FOR 14 DAYS THEN TWICE PER WEEK    LACTOBACILLUS ACIDOPHILUS (PROBIOTIC PO) Take  by mouth.  OTHER alestrian hormone cream    OTHER Tumeric 1 cap daily    CLINDESSE 2 % SR vaginal cream     MILK THISTLE PO Take 1 Cap by mouth daily.  vitamin e 400 unit tab Take 1 Cap by mouth daily.  Cholecalciferol, Vitamin D3, (VITAMIN D) 2,000 unit cap capsule Take 2,000 Units by mouth daily.  cpap machine kit by Does Not Apply route. Replace mask, headgear, heated hose, filters and accessories as needed for one year.    CPAP pressure: 9 cwp  Mask: standard Mirage FX plus Opus nasal pillows mask, or mask of [facility] Company: MED  Download data 30 days after initiation of therapy    cefUROXime (CEFTIN) 500 mg tablet Take 1 Tab by mouth two (2) times a day.  levalbuterol tartrate (XOPENEX) 45 mcg/actuation inhaler INHALE 1-2 PUFFS EVERY 4 TO 6 HOURS AS NEEDED    predniSONE (DELTASONE) 20 mg tablet Take 2 tablets qam x5 days with food.  cyclobenzaprine (FLEXERIL) 5 mg tablet TAKE 1-2 TABLETS BY MOUTH AT NIGHT.  meclizine (ANTIVERT) 25 mg tablet TAKE 1 TAB BY MOUTH THREE (3) TIMES DAILY AS NEEDED FOR UP TO 10 DAYS.  valACYclovir (VALTREX) 500 mg tablet Take 500 mg by mouth daily.  ranitidine (ZANTAC) 300 mg tablet Take 300 mg by mouth daily as needed. No current facility-administered medications for this visit. OBJECTIVE:  alert, well appearing, and in no distress  Visit Vitals  /82 (BP 1 Location: Left arm, BP Patient Position: Sitting)   Pulse 73   Temp 98.2 °F (36.8 °C) (Oral)   Resp 20   Ht 5' 5\" (1.651 m)   Wt 190 lb (86.2 kg)   SpO2 98%   BMI 31.62 kg/m²      well developed and well nourished  Ears - bilateral TM's and external ear canals normal  Nose - mucosal congestion  Mouth - mucous membranes moist, pharynx normal without lesions  Chest - clear to auscultation, no wheezes, rales or rhonchi, symmetric air entry  Heart - normal rate, regular rhythm, normal S1, S2, no murmurs, rubs, clicks or gallops          Assessment/Plan      ICD-10-CM ICD-9-CM    1. Seasonal allergic rhinitis due to pollen J30.1 477.0 Will treat with fluticasone propionate (FLONASE) 50 mcg/actuation nasal spray      And montelukast (Singulair) 10 mg tablet     Follow-up and Dispositions    · Return if symptoms worsen or fail to improve. Reviewed plan of care. Patient has provided input and agrees with goals.

## 2020-05-04 ENCOUNTER — VIRTUAL VISIT (OUTPATIENT)
Dept: INTERNAL MEDICINE CLINIC | Age: 55
End: 2020-05-04

## 2020-05-04 ENCOUNTER — HOSPITAL ENCOUNTER (OUTPATIENT)
Dept: GENERAL RADIOLOGY | Age: 55
Discharge: HOME | End: 2020-05-04
Payer: OTHER GOVERNMENT

## 2020-05-04 DIAGNOSIS — M54.41 ACUTE RIGHT-SIDED LOW BACK PAIN WITH RIGHT-SIDED SCIATICA: Primary | ICD-10-CM

## 2020-05-04 DIAGNOSIS — M54.41 ACUTE RIGHT-SIDED LOW BACK PAIN WITH RIGHT-SIDED SCIATICA: ICD-10-CM

## 2020-05-04 PROCEDURE — 72110 X-RAY EXAM L-2 SPINE 4/>VWS: CPT

## 2020-05-04 RX ORDER — PREDNISONE 10 MG/1
TABLET ORAL
Qty: 25 TAB | Refills: 0 | Status: SHIPPED | OUTPATIENT
Start: 2020-05-04 | End: 2020-06-22 | Stop reason: CLARIF

## 2020-05-04 NOTE — PROGRESS NOTES
Erma Feldman 53 y.o. female who was seen by synchronous (real-time) audio-video technology on 05/04/20    Consent:  sheand/or her healthcare decision maker is aware that this patient-initiated Telehealth encounter is a billable service, with coverage as determined by her insurance carrier. she is aware that she may receive a bill and has provided verbal consent to proceed: Yes I was in my office while conducting this encounter. The patient was in their home. Erma Feldman is a 53 y.o.  female and presents with Back Pain (right )      SUBJECTIVE:    Back Pain  Patient presents for presents evaluation of low back problems. Symptoms have been present for several weeks and include pain in right lower back radiating to buttock (aching in character; 7/10 in severity). Initial inciting event: none. Symptoms are worst: most of day. Alleviating factors identifiable by patient are recumbency. Exacerbating factors identifiable by patient are standing, walking. Treatments so far initiated by patient: stretches,  Previous lower back problems: none. Previous workup: none. Previous treatments: none. Respiratory ROS: negative for - shortness of breath  Cardiovascular ROS: negative for - chest pain    Current Outpatient Medications   Medication Sig    predniSONE (DELTASONE) 10 mg tablet 5 tabs daily for 5 days    fluticasone propionate (FLONASE) 50 mcg/actuation nasal spray 2 Sprays by Both Nostrils route daily.  montelukast (Singulair) 10 mg tablet Take 1 Tab by mouth daily.  cefUROXime (CEFTIN) 500 mg tablet Take 1 Tab by mouth two (2) times a day.  levalbuterol tartrate (XOPENEX) 45 mcg/actuation inhaler INHALE 1-2 PUFFS EVERY 4 TO 6 HOURS AS NEEDED    metFORMIN (GLUCOPHAGE) 500 mg tablet Take 1 Tab by mouth two (2) times daily (with meals).     simvastatin (ZOCOR) 20 mg tablet TAKE 1 TABLET BY MOUTH EVERYDAY AT BEDTIME    lisinopril-hydroCHLOROthiazide (PRINZIDE, ZESTORETIC) 20-12.5 mg per tablet TAKE 1 TABLET BY MOUTH EVERY DAY    cloNIDine (CATAPRES) 0.1 mg/24 hr ptwk APPLY 1 PATCH TO SKIN AND CHANGE EVERY 7 DAYS    omeprazole (PRILOSEC) 40 mg capsule Take 40 mg by mouth daily.  cyclobenzaprine (FLEXERIL) 5 mg tablet TAKE 1-2 TABLETS BY MOUTH AT NIGHT.  YUVAFEM 10 mcg tab vaginal tablet INSERT 1 APPLICATORFUL VAGINALLY AT BEDTIME FOR 14 DAYS THEN TWICE PER WEEK    meclizine (ANTIVERT) 25 mg tablet TAKE 1 TAB BY MOUTH THREE (3) TIMES DAILY AS NEEDED FOR UP TO 10 DAYS.  LACTOBACILLUS ACIDOPHILUS (PROBIOTIC PO) Take  by mouth.  OTHER alestrian hormone cream    OTHER Tumeric 1 cap daily    valACYclovir (VALTREX) 500 mg tablet Take 500 mg by mouth daily.  ranitidine (ZANTAC) 300 mg tablet Take 300 mg by mouth daily as needed.  CLINDESSE 2 % SR vaginal cream     MILK THISTLE PO Take 1 Cap by mouth daily.  vitamin e 400 unit tab Take 1 Cap by mouth daily.  Cholecalciferol, Vitamin D3, (VITAMIN D) 2,000 unit cap capsule Take 2,000 Units by mouth daily.  cpap machine kit by Does Not Apply route. Replace mask, headgear, heated hose, filters and accessories as needed for one year. CPAP pressure: 9 cwp  Mask: standard Mirage FX plus Opus nasal pillows mask, or mask of choice  Home[employer]: [employer]  Download data 30 days after initiation of therapy     No current facility-administered medications for this visit. OBJECTIVE:  alert, well appearing, and in no distress  There were no vitals taken for this visit. well developed and well nourished  Back exam - positive straight-leg raise right              Assessment/Plan      ICD-10-CM ICD-9-CM    1. Acute right-sided low back pain with right-sided sciatica M54.41 724.2 predniSONE (DELTASONE) 10 mg tablet     724.3 XR SPINE LUMB MIN 4 V      REFERRAL TO PHYSICAL THERAPY           Reviewed plan of care. Patient has provided input and agrees with goals.

## 2020-05-12 ENCOUNTER — HOSPITAL ENCOUNTER (OUTPATIENT)
Dept: PHYSICAL THERAPY | Age: 55
Discharge: HOME | End: 2020-05-12
Payer: OTHER GOVERNMENT

## 2020-05-12 PROCEDURE — 97162 PT EVAL MOD COMPLEX 30 MIN: CPT

## 2020-05-12 PROCEDURE — 97140 MANUAL THERAPY 1/> REGIONS: CPT

## 2020-05-12 PROCEDURE — 97110 THERAPEUTIC EXERCISES: CPT

## 2020-05-12 NOTE — PROGRESS NOTES
PT DAILY TREATMENT NOTE 10-18    Patient Name: Dipti Lopez  Date:2020  : 1965   [x]  Patient  Verified  Payor: Jamila Rae / Plan: Josefa Moise / Product Type: Commerical /    In [id number]  Out [id number]  Total Treatment Time (min): 48  Visit #: 1 of 8        Treatment Area: Low back pain [M54.5]    SUBJECTIVE  Pain Level (0-10 scale): 8  Any medication changes, allergies to medications, adverse drug reactions, diagnosis change, or new procedure performed?: [x] No    [] Yes (see summary sheet for update)  Subjective functional status/changes:   [] No changes reported       OBJECTIVE    Modality rationale:    Min Type Additional Details    [] Estim:  []Unatt       []IFC  []Premod                        []Other:  []w/ice   []w/heat  Position:  Location:    [] Estim: []Att    []TENS instruct  []NMES                    []Other:  []w/US   []w/ice   []w/heat  Position:  Location:    []  Traction: [] Cervical       []Lumbar                       [] Prone          []Supine                       []Intermittent   []Continuous Lbs:  [] before manual  [] after manual    []  Ultrasound: []Continuous   [] Pulsed                           []1MHz   []3MHz W/cm2:  Location:    []  Iontophoresis with dexamethasone         Location: [] Take home patch   [] In clinic    []  Ice     []  heat  []  Ice massage  []  Laser   []  Anodyne Position:  Location:    []  Laser with stim  []  Other:  Position:  Location:    []  Vasopneumatic Device Pressure:       [] lo [] med [] hi   Temperature: [] lo [] med [] hi   [] Skin assessment post-treatment:  []intact []redness- no adverse reaction    []redness - adverse reaction:     30 min [x]Eval                  []Re-Eval       10 min Therapeutic Exercise:  [] See flow sheet : HEP   Rationale: increase ROM and increase strength to improve the patients ability to perform functional tasks.        13 min Manual Therapy:   Right LE distraction for right up-slip correction, myofascial stick to the right glute/piriformis, right piriformis release   Rationale: decrease pain, increase tissue extensibility and decrease trigger points to perform functional tasks. With   [] TE   [] TA   [] neuro   [] other: Patient Education: [x] Review HEP    [] Progressed/Changed HEP based on:   [] positioning   [] body mechanics   [] transfers   [] heat/ice application    [] other:      Other Objective/Functional Measures:       Pain Level (0-10 scale) post treatment: 4-5/10    ASSESSMENT/Changes in Function:  Improved alignment and diminished pain level following manual treatment. Patient will continue to benefit from skilled PT services to modify and progress therapeutic interventions, address functional mobility deficits, address ROM deficits, address strength deficits, analyze and address soft tissue restrictions, analyze and cue movement patterns, analyze and modify body mechanics/ergonomics and assess and modify postural abnormalities to attain remaining goals. [x]  See Plan of Care  []  See progress note/recertification  []  See Discharge Summary         Progress towards goals / Updated goals:  Short Term Goals: To be accomplished in 1 weeks:   1. Pt will be I and compliant with HEP   IE- issued HEP   Long Term Goals: To be accomplished in 4 weeks:   1. Improve FOTO score to 61 for improved ability for daily activities. IE- 36   2. Pt will demonstrate 4+/5 right glute max strength to improve ability for standing and other daily tasks. IE: 4-/5   3. Pt will report at least 75% improvement to improve her ability for work activities. IE- none   4. Pt will report no difficulty with performing her usual work or housework.     IE- \"Quite a bit of difficulty\"    PLAN  []  Upgrade activities as tolerated     [x]  Continue plan of care  []  Update interventions per flow sheet       []  Discharge due to:_  []  Other:_      Sri Smith, PT 5/12/2020  8:11 AM    Future Appointments   Date Time Provider Department Center   5/12/2020  8:15 AM Angelita Isaac PT [facility]   8/5/2020  9:15 AM [facility] NURSE [facility]   8/5/2020  9:35 AM [facility] NURSE VISIT [facility]   8/12/2020  9:15 AM Lenny Ortega MD [address] [place] [address]

## 2020-05-14 ENCOUNTER — HOSPITAL ENCOUNTER (OUTPATIENT)
Dept: PHYSICAL THERAPY | Age: 55
Discharge: HOME | End: 2020-05-14
Payer: OTHER GOVERNMENT

## 2020-05-14 PROCEDURE — 97112 NEUROMUSCULAR REEDUCATION: CPT | Performed by: PHYSICAL THERAPIST

## 2020-05-14 PROCEDURE — 97110 THERAPEUTIC EXERCISES: CPT | Performed by: PHYSICAL THERAPIST

## 2020-05-14 PROCEDURE — 97140 MANUAL THERAPY 1/> REGIONS: CPT | Performed by: PHYSICAL THERAPIST

## 2020-05-14 NOTE — PROGRESS NOTES
PT DAILY TREATMENT NOTE 10-18    Patient Name: Galilea Perez  Date:2020  : 1965  [x]  Patient  Verified  Payor: Wabasha Pavithra / Plan: Ifrah Zimmerman / Product Type: Commerical /    In time:732  Out time:819  Total Treatment Time (min): 52  Visit #: 2 of 8    Treatment Area: Low back pain [M54.5]    SUBJECTIVE  Pain Level (0-10 scale): 7-8/10  Any medication changes, allergies to medications, adverse drug reactions, diagnosis change, or new procedure performed?: [x] No    [] Yes (see summary sheet for update)  Subjective functional status/changes:   [] No changes reported  Pt reports she felt better after last session but that it didn't last too long.  Reports compliance w/ HEP to date    OBJECTIVE    Modality rationale: decrease pain and increase tissue extensibility to improve the patients ability to improve post workout soreness   Min Type Additional Details    [] Estim:  []Unatt       []IFC  []Premod                        []Other:  []w/ice   []w/heat  Position:  Location:    [] Estim: []Att    []TENS instruct  []NMES                    []Other:  []w/US   []w/ice   []w/heat  Position:  Location:    []  Traction: [] Cervical       []Lumbar                       [] Prone          []Supine                       []Intermittent   []Continuous Lbs:  [] before manual  [] after manual    []  Ultrasound: []Continuous   [] Pulsed                           []1MHz   []3MHz W/cm2:  Location:    []  Iontophoresis with dexamethasone         Location: [] Take home patch   [] In clinic   10 []  Ice     [x]  heat  []  Ice massage  []  Laser   []  Anodyne Position: supine w/ LE elevation  Location: low back/buttocks    []  Laser with stim  []  Other:  Position:  Location:    []  Vasopneumatic Device Pressure:       [] lo [] med [] hi   Temperature: [] lo [] med [] hi   [] Skin assessment post-treatment:  []intact []redness- no adverse reaction    []redness - adverse reaction:     8 min Therapeutic Exercise:  [x] See flow sheet : initiated per flow sheet    Rationale: increase ROM and increase strength to improve the patients ability to decrease pain and improve activity tolerance      17 min Neuromuscular Re-education:  [x]  See flow sheet : core activation and pilates ex's per flow sheet   Rationale: increase strength, improve coordination and increase proprioception  to improve the patients ability to decrease pain and improve stability     12 min Manual Therapy: supine: Alignment check and MET for right ant rotation, shotgun technique to set, prone: TPR/DTM to right glutes, piriformis, hir ERs, prone right hip PA mobs in neutral and ER   Rationale: decrease pain, increase ROM, increase tissue extensibility and decrease trigger points to improve activity tolerance and pelvic alignment          With   [] TE   [] TA   [] neuro   [] other: Patient Education: [x] Review HEP    [] Progressed/Changed HEP based on:   [] positioning   [] body mechanics   [] transfers   [] heat/ice application    [] other:      Other Objective/Functional Masures:      Pain Level (0-10 scale) post treatment: 2/10      ASSESSMENT/Changes in Function: Initiated POC per flow sheet. Pt presented w/ right anterior rotation that corrected w/ MET. Mod VC's for correct technique with PPT. Noted difficulty holding PPT >5 seconds    Patient will continue to benefit from skilled PT services to modify and progress therapeutic interventions, address functional mobility deficits, address strength deficits, analyze and address soft tissue restrictions, analyze and cue movement patterns, analyze and modify body mechanics/ergonomics, assess and modify postural abnormalities and instruct in home and community integration to attain remaining goals. []  See Plan of Care  []  See progress note/recertification  []  See Discharge Summary         Progress towards goals / Updated goals:  Short Term Goals:  To be accomplished in 1 weeks:              1. Pt will be I and compliant with HEP              IE- issued HEP    Current: progressing, initiated and compliant to date 5/14/2020  Long Term Goals: To be accomplished in 4 weeks:              1. Improve FOTO score to 61 for improved ability for daily activities. IE- 39              2. Pt will demonstrate 4+/5 right glute max strength to improve ability for standing and other daily tasks. IE: 4-/5              3. Pt will report at least 75% improvement to improve her ability for work activities. IE- none              4. Pt will report no difficulty with performing her usual work or housework.                IE- \"Quite a bit of difficulty\"    PLAN  [x]  Upgrade activities as tolerated     []  Continue plan of care  []  Update interventions per flow sheet       []  Discharge due to:_  []  Other:_      Leonides Senior, PT 5/14/2020  7:48 AM    Future Appointments   Date Time Provider Chay Meng   5/19/2020  8:15 AM Nora France, PT [facility]   5/21/2020  9:00 AM Nora France, PT [facility]   5/26/2020  7:30 AM Nora France, PT [facility]   5/28/2020  8:15 AM Anoop Jeffers, PT [facility]   6/2/2020  8:15 AM Nora France, PT [facility]   6/4/2020  8:15 AM Nora France, PT [facility]   8/5/2020  9:15 AM [facility] NURSE [facility]   8/5/2020  9:35 AM [facility] NURSE VISIT [facility]   8/12/2020  9:15 AM Saundra Ortega MD [address] [facility]

## 2020-05-19 ENCOUNTER — APPOINTMENT (OUTPATIENT)
Dept: PHYSICAL THERAPY | Age: 55
End: 2020-05-19
Payer: OTHER GOVERNMENT

## 2020-05-20 ENCOUNTER — TELEPHONE (OUTPATIENT)
Dept: INTERNAL MEDICINE CLINIC | Age: 55
End: 2020-05-20

## 2020-05-20 ENCOUNTER — OFFICE VISIT (OUTPATIENT)
Dept: FAMILY MEDICINE CLINIC | Facility: CLINIC | Age: 55
End: 2020-05-20

## 2020-05-20 VITALS
DIASTOLIC BLOOD PRESSURE: 90 MMHG | SYSTOLIC BLOOD PRESSURE: 166 MMHG | RESPIRATION RATE: 16 BRPM | TEMPERATURE: 97.4 F | HEART RATE: 64 BPM | OXYGEN SATURATION: 100 %

## 2020-05-20 DIAGNOSIS — R68.83 CHILLS: ICD-10-CM

## 2020-05-20 DIAGNOSIS — Z71.89 EDUCATED ABOUT COVID-19 VIRUS INFECTION: ICD-10-CM

## 2020-05-20 DIAGNOSIS — J30.89 ENVIRONMENTAL AND SEASONAL ALLERGIES: ICD-10-CM

## 2020-05-20 DIAGNOSIS — R53.83 FATIGUE, UNSPECIFIED TYPE: ICD-10-CM

## 2020-05-20 DIAGNOSIS — B34.9 VIRAL SYNDROME: Primary | ICD-10-CM

## 2020-05-20 DIAGNOSIS — R03.0 ELEVATED BLOOD PRESSURE READING: ICD-10-CM

## 2020-05-20 LAB
FLUAV+FLUBV AG NOSE QL IA.RAPID: NEGATIVE POS/NEG
FLUAV+FLUBV AG NOSE QL IA.RAPID: NEGATIVE POS/NEG
S PYO AG THROAT QL: NEGATIVE
VALID INTERNAL CONTROL?: YES
VALID INTERNAL CONTROL?: YES

## 2020-05-20 RX ORDER — SODIUM CHLORIDE 0.65 %
1 AEROSOL, SPRAY (ML) NASAL AS NEEDED
Qty: 30 ML | Refills: 0
Start: 2020-05-20

## 2020-05-20 NOTE — PROGRESS NOTES
Chief Complaint   Patient presents with    Sweats     with chills at night     Cough    Fatigue       SUBJECTIVE:    The patient presents to our specialty flu / COVID-19 clinic with a focused complaint of the following: dry cough, fatigue and sweats. Unable to check for fever due to no thermometer-per pt; denies nausea, vomiting, diarrhea and constipation; denies  symptoms. ; denies respiratory symptoms. shortness of breath. Pt has hx of seasonal allergies and uses singulair and flonase at times. Pt states she was treated for ear infection many months ago but denies ear pain or fullness at this time. Patient denies recent travel. Pt exposed to sick contacts under investigations for possible Covid UNKNOWN. 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    Pt is not a current smoker. OBJECTIVE    Visit Vitals  /90 (BP 1 Location: Left arm, BP Patient Position: Sitting)   Pulse 64   Temp 97.4 °F (36.3 °C) (Oral)   Resp 16   SpO2 100%      General:  alert, well nourished, cooperative and in no apparent distress. Sick but not toxic appearing. Eyes:   The lids are without swelling, lesions, or drainage. The conjunctiva is clear and noninjected. ENT:  bilateral TM normal without fluid or infection, martha ear canals slightly inflamed, neck without nodes, throat normal without erythema or exudate and airway not compromised. Neck: normal.  Lungs/CV: clear to auscultation, no wheezes or rales and unlabored breathing. Heart: regular rhythm normal rate. Skin:  No rashes, no jaundice. ASSESSMENT / PLAN     ICD-10-CM ICD-9-CM    1. Viral syndrome B34.9 079.99    2. Fatigue, unspecified type R53.83 780.79 NOVEL CORONAVIRUS (COVID-19)   3. Chills R68.83 780.64 AMB POC RAPID STREP A      AMB POC RAPID INFLUENZA TEST      NOVEL CORONAVIRUS (COVID-19)   4. Environmental and seasonal allergies J30.89 477.8 sodium chloride (Saline Nasal Mist) 0.65 % nasal squeeze bottle   5.  Elevated blood pressure reading R03.0 796.2    6. Educated about COVID-19 virus infection Z71.89 V65.49        negative for influenza. negative for strep. Based on CDC recommendations and limited testing supplies, only those patients who meet criteria will be tested for Covid. High priority groups for testing   Symptomatic and/or Exposure /Test for Covid  Immunocompromised host (on prednisone, biological therapy, blood cancer, metastatic cancer or active chemotherapy)   [facility] care worker in the home    Other high-risk group: o age >47   o Uncontrolled DM   o Uncontrolled HTN   o BMI >40, CKD/ESRD    Dialysis patients (patients going to HD units, not asymptomatic home HD/PD)    Anyone living in a congregate setting     Non High-risk patient category           Test for COVID-19   Asymptomatic, no known exposure  No    Asymptomatic, possible exposure  No    Asymptomatic, definite exposure  Provider discretion    Symptomatic, no known exposure  Yes    Symptomatic, + exposure  Yes      low suspicion of Covid at this time due to symptoms and possible exposure. Based on this patients symptoms and the current pandemic situation, I have recommended self quarantine until covid results are available. Patient does  meet criteria for Covid testing. COVID-19 testing was completed. Work note was given until [employer] are available. If Covid testing was completed and is negative, patient may return back to work despite quarantine originally set in place if applicable. Awaiting Covid 19 results at this time. Instructed pt to check temp if possible and to take acetaminophen if fevers are noted, avoid NSAIDs. Instructed pt on rest and water intake. Remember to wash hands, disinfect surroundings, and avoid touching face. Instructed pt to remain home until Covid results are negative and all symptoms have improved or subsided. If they must leave home, wear a mask. Patient verbalized understanding.     We have provided the patient with a detailed after visit summary which was reviewed, and red flag symptoms that would warrant an ER visit were emphasized. CC'd chart to PCP: Yes: Comment: DAVID Ortega     BP elevated today. Pt to contact PCP. Dr. Maribeth Omer, AGNP-C, DNP      This visit was provided as a focused evaluation during the COVID -19 pandemic/national emergency. A comprehensive review of all previous patient history and testing was not conducted. Pertinent findings were elicited during the visit.

## 2020-05-20 NOTE — TELEPHONE ENCOUNTER
[email or web address] an [facility]. Patient will need to schedule an appointment and be evaluated by one of the providers at the clinic.

## 2020-05-20 NOTE — TELEPHONE ENCOUNTER
Pt calling asking if she can get an order to get tested for covid. 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 She does not know if she has been around anyone that tested positive. She did not take temp but did have the sweats during the night. Needs a rapid test because she is due to work tomorrow.

## 2020-05-20 NOTE — LETTER
NOTIFICATION RETURN TO WORK / SCHOOL 
 
5/20/2020 2:18 PM 
 
Ms. Fab Zuniga [address] [place] [id number] To Whom It May Concern: 
 
Fab Zuniga is currently under the care of [facility]y Ordaz and needs to be quarantine until test results. She will return to work/school on: 5/26/2020 If there are questions or concerns please have the patient contact our office. Sincerely, Geraldo Oseguera NP

## 2020-05-21 ENCOUNTER — APPOINTMENT (OUTPATIENT)
Dept: PHYSICAL THERAPY | Age: 55
End: 2020-05-21
Payer: OTHER GOVERNMENT

## 2020-05-23 LAB — Lab: NOT DETECTED

## 2020-05-26 ENCOUNTER — TELEPHONE (OUTPATIENT)
Dept: FAMILY MEDICINE CLINIC | Facility: CLINIC | Age: 55
End: 2020-05-26

## 2020-05-26 ENCOUNTER — HOSPITAL ENCOUNTER (OUTPATIENT)
Dept: PHYSICAL THERAPY | Age: 55
Discharge: HOME | End: 2020-05-26
Payer: OTHER GOVERNMENT

## 2020-05-26 PROCEDURE — 97110 THERAPEUTIC EXERCISES: CPT

## 2020-05-26 PROCEDURE — 97140 MANUAL THERAPY 1/> REGIONS: CPT

## 2020-05-26 PROCEDURE — 97112 NEUROMUSCULAR REEDUCATION: CPT

## 2020-05-26 NOTE — PROGRESS NOTES
PT DAILY TREATMENT NOTE 10-18    Patient Name: Dari Hill  Date:2020  : 1965  [x]  Patient  Verified  Payor: Kizzie Galeazzi / Plan: Karen Iglesias / Product Type: Commerical /    In [id number]  Out [id number]  Total Treatment Time (min): 48  Visit #: 3 of 8      Treatment Area: Low back pain [M54.5]    SUBJECTIVE  Pain Level (0-10 scale): 4  Any medication changes, allergies to medications, adverse drug reactions, diagnosis change, or new procedure performed?: [x] No    [] Yes (see summary sheet for update)  Subjective functional status/changes:   [] No changes reported  The pt reports she is not too bad currently.      OBJECTIVE    Modality rationale: decrease inflammation and decrease pain to improve the patients ability to perform daily tasks   Min Type Additional Details    [] Estim:  []Unatt       []IFC  []Premod                        []Other:  []w/ice   []w/heat  Position:  Location:    [] Estim: []Att    []TENS instruct  []NMES                    []Other:  []w/US   []w/ice   []w/heat  Position:  Location:    []  Traction: [] Cervical       []Lumbar                       [] Prone          []Supine                       []Intermittent   []Continuous Lbs:  [] before manual  [] after manual    []  Ultrasound: []Continuous   [] Pulsed                           []1MHz   []3MHz W/cm2:  Location:    []  Iontophoresis with dexamethasone         Location: [] Take home patch   [] In clinic   10 [x]  Ice     []  heat  []  Ice massage  []  Laser   []  Anodyne Position:prone  Location:l/s/glutes    []  Laser with stim  []  Other:  Position:  Location:    []  Vasopneumatic Device Pressure:       [] lo [] med [] hi   Temperature: [] lo [] med [] hi   [] Skin assessment post-treatment:  []intact []redness- no adverse reaction    []redness - adverse reaction:       8 min Therapeutic Exercise:  [x] See flow sheet :   Rationale: increase ROM and increase strength to improve the patients ability to perform functional tasks.      15 min Neuromuscular Re-education:  [x]  See flow sheet : pilate's based exercises   Rationale: increase strength, improve coordination and increase proprioception  to improve the patients ability to perform daily tasks. 10 min Manual Therapy:  MET for anterior rotation, Right LE distraction for up-slip correction, STM right lumbar paraspinals/QL, myofascial stick to right piriformis and glutes   Rationale: decrease pain, increase tissue extensibility and decrease trigger points to improve ability for daily tasks. With   [] TE   [] TA   [] neuro   [] other: Patient Education: [x] Review HEP    [] Progressed/Changed HEP based on:   [] positioning   [] body mechanics   [] transfers   [] heat/ice application    [] other:      Other Objective/Functional Measures: Added Core Align side spilts     Pain Level (0-10 scale) post treatment: 0    ASSESSMENT/Changes in Function:  The pt requires some cues for therex technique. Good relief noted post manual today. Overall she appears to be making progressing as noted with her diminished pain level today. Patient will continue to benefit from skilled PT services to modify and progress therapeutic interventions, address functional mobility deficits, address ROM deficits, address strength deficits, analyze and address soft tissue restrictions and analyze and cue movement patterns to attain remaining goals. []  See Plan of Care  []  See progress note/recertification  []  See Discharge Summary         Progress towards goals / Updated goals:  Short Term Goals: To be accomplished in 1 weeks:              1. Pt will be I and compliant with HEP              IE- issued HEP               Current: progressing, initiated and compliant to date 5/14/2020  Long Term Goals: To be accomplished in 4 weeks:              1. Improve FOTO score to 61 for improved ability for daily activities.                VH- 39              2. Pt will demonstrate 4+/5 right glute max strength to improve ability for standing and other daily tasks.               AY: 4-/5              3. Pt will report at least 75% improvement to improve her ability for work activities.                TR- none   Current: progressing 5-26-20              4. Pt will report no difficulty with performing her usual work or housework.   Mega Rasheed- \"Quite a bit of difficulty\"    PLAN  []  Upgrade activities as tolerated     [x]  Continue plan of care  []  Update interventions per flow sheet       []  Discharge due to:_  []  Other:_      Scott Carter, PT 5/26/2020  7:39 AM    Future Appointments   Date Time Provider Chay Meng   5/28/2020  8:15 AM Shawna Jeffers, PT [facility]   6/2/2020  8:15 AM Foreign [place], PT [facility]   6/4/2020  8:15 AM Foreign [place], PT [facility]   8/5/2020  9:15 AM [facility] NURSE [facility] SCHED   8/5/2020  9:35 AM [facility] NURSE VISIT [facility] SCH[facility]   8/12/2020  9:15 AM Sandip Ortega MD 11 [facility]

## 2020-05-26 NOTE — PROGRESS NOTES
Brie, Please notify pt their Covid test was Negative. Remind pt to stay home but if they must leave home take all precautions: wear a mask, continue social distancing, disinfect home/work areas, avoid touching the face, and sanitize hands frequently. If they need a return to work note, please provide. Thank you.

## 2020-05-26 NOTE — PROGRESS NOTES
Request for use of Dry Needling/Intramuscular Manual Therapy  Patient: Kerwin Mensah     Referral Source: Ray Welch MD  Diagnosis: Low back pain [M54.5]      : 1965  Date of initial visit: 20   Attended visits: 3  Missed Visits: -    Based on findings from the physical therapy examination and evaluation, the evaluating therapist believes the patient, Kerwin Mensah  would benefit from including Dry Needling as part of the plan of care. Dry needling is a treatment technique utilized in conjunction with other PT interventions to inactivate myofascial trigger points and the pain and dysfunction they cause. Dry Needling is an advanced procedure that requires additional training including greater than 54 hours of intensive course work. Physical Therapists at [address] [place] [address] are trained and/or certified through [employer] for their education. PROCEDURE:   Solid filament sterile needle (typically 0.3mm/30 gauge) inserted into a trigger point   Repeated movements inactivate the trigger points, taking 30-60 seconds per site   Typically consists of 1 dry needling session per week and a possible second treatment including muscle re-education, flexibility, strengthening and other manual techniques to facilitate the benefits of dry needling     BENEFITS:   Inactivation of trigger points   Decreased pain   Increased muscle length   Improved movement patterns   Restoration of function POTENTIAL RISKS:   Post-needling soreness   Infection   Bruising/bleeding   Penetration of a nerve   Pneumothorax   All treating PTs have been thoroughly educated in avoiding adverse reactions    If you agree with this recommendation, please sign this form and fax it to us at [phone]. If you have questions or concerns regarding dry needling or any other treatment we may be providing, please contact us at [phone].     Thank you for allowing us to assist in the care of your patient. Dahlia Fontanez, PT    5/26/2020 9:04 AM     NOTE TO PHYSICIAN:  PLEASE COMPLETE THE ORDERS BELOW AND   FAX TO [facility]: [phone]  If you are unable to process this request in 24 hours please contact our office:   [phone]    I have read the above request and AGREE to the recommendation of including dry needling as part of the plan of care.       Physicians signature: _________________________Date: _________Time:________

## 2020-05-28 ENCOUNTER — HOSPITAL ENCOUNTER (OUTPATIENT)
Dept: PHYSICAL THERAPY | Age: 55
End: 2020-05-28
Payer: OTHER GOVERNMENT

## 2020-06-02 ENCOUNTER — HOSPITAL ENCOUNTER (OUTPATIENT)
Dept: PHYSICAL THERAPY | Age: 55
Discharge: HOME | End: 2020-06-02
Payer: OTHER GOVERNMENT

## 2020-06-02 PROCEDURE — 97110 THERAPEUTIC EXERCISES: CPT

## 2020-06-02 PROCEDURE — 97140 MANUAL THERAPY 1/> REGIONS: CPT

## 2020-06-02 PROCEDURE — 97112 NEUROMUSCULAR REEDUCATION: CPT

## 2020-06-02 NOTE — PROGRESS NOTES
PT DAILY TREATMENT NOTE 10-18    Patient Name: Kerwin Mensah  Date:2020  : 1965  [x]  Patient  Verified  Payor: Cyndee Cadena / Plan: [employer] / Product Type: Commerical /    In VROY:[id number]  Out ESVE:[id number]  Total Treatment Time (min): 66  Visit #: 4 of 8    Treatment Area: Low back pain [M54.5]    SUBJECTIVE  Pain Level (0-10 scale): 4  Any medication changes, allergies to medications, adverse drug reactions, diagnosis change, or new procedure performed?: [x] No    [] Yes (see summary sheet for update)  Subjective functional status/changes:   [] No changes reported  The pt reports she did alright after last session. OBJECTIVE    Modality rationale: decrease inflammation and decrease pain to improve the patients ability to perform ADL   Min Type Additional Details    [] Estim:  []Unatt       []IFC  []Premod                        []Other:  []w/ice   []w/heat  Position:  Location:    [] Estim: []Att    []TENS instruct  []NMES                    []Other:  []w/US   []w/ice   []w/heat  Position:  Location:    []  Traction: [] Cervical       []Lumbar                       [] Prone          []Supine                       []Intermittent   []Continuous Lbs:  [] before manual  [] after manual    []  Ultrasound: []Continuous   [] Pulsed                           []1MHz   []3MHz W/cm2:  Location:    []  Iontophoresis with dexamethasone         Location: [] Take home patch   [] In clinic   10 [x]  Ice     []  heat  []  Ice massage  []  Laser   []  Anodyne Position:prone  Location:lumbar    []  Laser with stim  []  Other:  Position:  Location:    []  Vasopneumatic Device Pressure:       [] lo [] med [] hi   Temperature: [] lo [] med [] hi   [] Skin assessment post-treatment:  []intact []redness- no adverse reaction    []redness - adverse reaction:       16 min Therapeutic Exercise:  [x]? See flow sheet :   Rationale: increase ROM and increase strength to improve the patients ability to perform functional tasks. 25 min Neuromuscular Re-education:  [x]? See flow sheet : pilate's based exercises   Rationale: increase strength, improve coordination and increase proprioception  to improve the patients ability to perform daily tasks.    15 min Manual Therapy:  MET for anterior rotation, Right LE distraction for up-slip correction, STM right lumbar paraspinals/QL, \"Meat hook\" instrument to right piriformis and glutes   Rationale: decrease pain, increase tissue extensibility and decrease trigger points to improve ability for daily tasks. With   [] TE   [] TA   [] neuro   [] other: Patient Education: [x] Review HEP    [] Progressed/Changed HEP based on:   [] positioning   [] body mechanics   [] transfers   [] heat/ice application    [] other:      Other Objective/Functional Measures: Added Moncho baldwin     Pain Level (0-10 scale) post treatment: 0 lumbar, (4 right hip)    ASSESSMENT/Changes in Function: The pt with good relief post treatment. Myofascial restrictions improving in lower lumbar paraspinals. Patient will continue to benefit from skilled PT services to modify and progress therapeutic interventions, address functional mobility deficits, address ROM deficits, address strength deficits, analyze and address soft tissue restrictions and analyze and cue movement patterns to attain remaining goals. []  See Plan of Care  []  See progress note/recertification  []  See Discharge Summary         Progress towards goals / Updated goals:  Short Term Goals: To be accomplished in 1 weeks:              1. Pt will be I and compliant with HEP              IE- issued HEP               OWMWVVZ: MET 6-2-20  Long Term Goals: To be accomplished in 4 weeks:              1. Improve FOTO score to 61 for improved ability for daily activities.                XD- 39              2. Pt will demonstrate 4+/5 right glute max strength to improve ability for standing and other daily tasks.               IE: 4-/5   Current: 4-/5 on 6-2-20              3. Pt will report at least 75% improvement to improve her ability for work activities.                SJ- none              Current: progressing 5-26-20              4. Pt will report no difficulty with performing her usual work or housework.   Robson Poles- \"Quite a bit of difficulty\"    PLAN  []  Upgrade activities as tolerated     [x]  Continue plan of care  []  Update interventions per flow sheet       []  Discharge due to:_  []  Other:_      Jemma Fraire, PT 6/2/2020  8:08 AM    Future Appointments   Date Time Provider Chay Meng   6/2/2020  8:15 AM Leandro Huitron, PT [facility]   6/4/2020  8:15 AM Leandro Huitron, PT [facility]   8/5/2020  9:15 AM [facility] NURSE [facility]   8/5/2020  9:35 AM [facility] NURSE VISIT [facility]   8/12/2020  9:15 AM Anuja Ortega MD [address] [place] [address]

## 2020-06-04 ENCOUNTER — HOSPITAL ENCOUNTER (OUTPATIENT)
Dept: PHYSICAL THERAPY | Age: 55
Discharge: HOME | End: 2020-06-04
Payer: OTHER GOVERNMENT

## 2020-06-04 PROCEDURE — 97112 NEUROMUSCULAR REEDUCATION: CPT

## 2020-06-04 PROCEDURE — 97140 MANUAL THERAPY 1/> REGIONS: CPT

## 2020-06-04 PROCEDURE — 97110 THERAPEUTIC EXERCISES: CPT

## 2020-06-04 NOTE — PROGRESS NOTES
PT DAILY TREATMENT NOTE 10-18    Patient Name: Fab Zuniga  Date:2020  : 1965  [x]  Patient  Verified  Payor: Amna Espinoza / Plan: Callie Code / Product Type: Commerical /    In UCYY:1652  Out CUVV:[id number]  Total Treatment Time (min): 78  Visit #: 5 of 8      Treatment Area: Low back pain [M54.5]    SUBJECTIVE  Pain Level (0-10 scale): 2  Any medication changes, allergies to medications, adverse drug reactions, diagnosis change, or new procedure performed?: [x] No    [] Yes (see summary sheet for update)  Subjective functional status/changes:   [] No changes reported  The pt reports she did well after last session. She feels she is making progress with PT.     OBJECTIVE    Modality rationale: decrease inflammation and decrease pain to improve the patients ability to perform ADL   Min Type Additional Details    [] Estim:  []Unatt       []IFC  []Premod                        []Other:  []w/ice   []w/heat  Position:  Location:    [] Estim: []Att    []TENS instruct  []NMES                    []Other:  []w/US   []w/ice   []w/heat  Position:  Location:    []  Traction: [] Cervical       []Lumbar                       [] Prone          []Supine                       []Intermittent   []Continuous Lbs:  [] before manual  [] after manual    []  Ultrasound: []Continuous   [] Pulsed                           []1MHz   []3MHz W/cm2:  Location:    []  Iontophoresis with dexamethasone         Location: [] Take home patch   [] In clinic   10 [x]  Ice     []  heat  []  Ice massage  []  Laser   []  Anodyne Position: prone  Location:lumbar/ glutes/piriformis    []  Laser with stim  []  Other:  Position:  Location:    []  Vasopneumatic Device Pressure:       [] lo [] med [] hi   Temperature: [] lo [] med [] hi   [] Skin assessment post-treatment:  []intact []redness- no adverse reaction    []redness - adverse reaction:           13 min Therapeutic Exercise:  [x]? ? See flow sheet :   Rationale: increase ROM and increase strength to improve the patients ability to perform functional tasks.      40 min Neuromuscular Re-education:  [x]? ?  See flow sheet : pilate's based exercises   Rationale: increase strength, improve coordination and increase proprioception  to improve the patients ability to perform daily tasks.      15 min Manual Therapy:Level alignment, STM right lumbar paraspinals/QL, \"Meat hook\" instrument to right piriformis and glutes   Rationale: decrease pain, increase tissue extensibility and decrease trigger points to improve ability for daily tasks.             With   [] TE   [] TA   [] neuro   [] other: Patient Education: [x] Review HEP    [] Progressed/Changed HEP based on:   [] positioning   [] body mechanics   [] transfers   [] heat/ice application    [] other:      Other Objective/Functional Measures:  Level SI alignment     Pain Level (0-10 scale) post treatment: 0    ASSESSMENT/Changes in Function:  Good relief post manual today. The patient was able to progress with stabilization exercises without increase in pain. She does require some cues for neutral spine at times but is able to correct with cues. Patient will continue to benefit from skilled PT services to modify and progress therapeutic interventions, address functional mobility deficits, address ROM deficits, address strength deficits and analyze and address soft tissue restrictions to attain remaining goals. []  See Plan of Care  []  See progress note/recertification  []  See Discharge Summary         Progress towards goals / Updated goals:  Short Term Goals: To be accomplished in 1 weeks:              1. Pt will be I and compliant with HEP              IE- issued HEP               ZPVTKWW: MET 6-2-20  Long Term Goals: To be accomplished in 4 weeks:              1. Improve FOTO score to 61 for improved ability for daily activities.                FI- 39              2. Pt will demonstrate 4+/5 right glute max strength to improve ability for standing and other daily tasks.               PI: 4-/5              Current: 4-/5 on 6-2-20              3. Pt will report at least 75% improvement to improve her ability for work activities.                JV- none              HHIYGCD: progressing 5-26-20              4. Pt will report no difficulty with performing her usual work or housework.   Josephine Blood- \"Quite a bit of difficulty\"    PLAN  []  Upgrade activities as tolerated     [x]  Continue plan of care  []  Update interventions per flow sheet       []  Discharge due to:_  []  Other:_      Tarik Almanzar, PT 6/4/2020  8:50 AM    Future Appointments   Date Time Provider Chay Meng   8/5/2020  9:15 AM [facility] 65 22   8/5/2020  9:35 AM [facility] NURSE VISIT [address]   8/12/2020  9:15 AM Isabel Ortega MD [facility]

## 2020-06-05 RX ORDER — SIMVASTATIN 20 MG/1
TABLET, FILM COATED ORAL
Qty: 90 TAB | Refills: 2 | Status: SHIPPED | OUTPATIENT
Start: 2020-06-05 | End: 2021-05-30

## 2020-06-10 ENCOUNTER — HOSPITAL ENCOUNTER (OUTPATIENT)
Dept: PHYSICAL THERAPY | Age: 55
Discharge: HOME | End: 2020-06-10
Payer: OTHER GOVERNMENT

## 2020-06-10 PROCEDURE — 97112 NEUROMUSCULAR REEDUCATION: CPT

## 2020-06-10 PROCEDURE — 97140 MANUAL THERAPY 1/> REGIONS: CPT

## 2020-06-10 PROCEDURE — 97110 THERAPEUTIC EXERCISES: CPT

## 2020-06-10 NOTE — PROGRESS NOTES
PT DAILY TREATMENT NOTE 10-18    Patient Name: Alejandro Turner  Date:6/10/2020  : 1965  [x]  Patient  Verified  Payor: Brandyn Butler / Plan: Clarence Valdez / Product Type: Commerical /    In time:0900  Out time:9:57  Total Treatment Time (min): 57  Visit #: 6 of 8      Treatment Area: Low back pain [M54.5]    SUBJECTIVE  Pain Level (0-10 scale): 2  Any medication changes, allergies to medications, adverse drug reactions, diagnosis change, or new procedure performed?: [x] No    [] Yes (see summary sheet for update)  Subjective functional status/changes:   [] No changes reported  The pt reports she is feeling better. OBJECTIVE    Modality rationale: decrease inflammation and decrease pain to improve the patients ability to perform ADL   Min Type Additional Details    [] Estim:  []Unatt       []IFC  []Premod                        []Other:  []w/ice   []w/heat  Position:  Location:    [] Estim: []Att    []TENS instruct  []NMES                    []Other:  []w/US   []w/ice   []w/heat  Position:  Location:    []  Traction: [] Cervical       []Lumbar                       [] Prone          []Supine                       []Intermittent   []Continuous Lbs:  [] before manual  [] after manual    []  Ultrasound: []Continuous   [] Pulsed                           []1MHz   []3MHz W/cm2:  Location:    []  Iontophoresis with dexamethasone         Location: [] Take home patch   [] In clinic   10 [x]  Ice     []  heat  []  Ice massage  []  Laser   []  Anodyne Position: prone  Location: SIJ/Lumbar    []  Laser with stim  []  Other:  Position:  Location:    []  Vasopneumatic Device Pressure:       [] lo [] med [] hi   Temperature: [] lo [] med [] hi   [] Skin assessment post-treatment:  []intact []redness- no adverse reaction    []redness - adverse reaction:       12 min Therapeutic Exercise:  [x]? ?? See flow sheet :   Rationale: increase ROM and increase strength to improve the patients ability to perform functional tasks.      27 min Neuromuscular Re-education:  [x]? ??  See flow sheet : pilate's based exercises   Rationale: increase strength, improve coordination and increase proprioception  to improve the patients ability to perform daily tasks.      8 min Manual Therapy:Level alignment, STM right lumbar paraspinals/QL, \"Meat hook\" instrument to right piriformis   Rationale: decrease pain, increase tissue extensibility and decrease trigger points to improve ability for daily tasks.             With   [] TE   [] TA   [] neuro   [] other: Patient Education: [x] Review HEP    [] Progressed/Changed HEP based on:   [] positioning   [] body mechanics   [] transfers   [] heat/ice application    [] other:      Other Objective/Functional Measures:  Level SI alignment     Pain Level (0-10 scale) post treatment: 1    ASSESSMENT/Changes in Function: The pt is making good progress with PT. Pain is diminished and stability is progressing. Patient will continue to benefit from skilled PT services to modify and progress therapeutic interventions, address functional mobility deficits, address ROM deficits, address strength deficits, analyze and address soft tissue restrictions and analyze and cue movement patterns to attain remaining goals. []  See Plan of Care  []  See progress note/recertification  []  See Discharge Summary         Progress towards goals / Updated goals:  Short Term Goals: To be accomplished in 1 weeks:              1. Pt will be I and compliant with HEP              IE- issued HEP               XCQOVPR: MET 6-2-20  Long Term Goals: To be accomplished in 4 weeks:              1. Improve FOTO score to 61 for improved ability for daily activities.                DK- 39              2. Pt will demonstrate 4+/5 right glute max strength to improve ability for standing and other daily tasks.               AS: 4-/5              Current: 4-/5 on 6-2-20              3. Pt will report at least 75% improvement to improve her ability for work activities.                VY- none              KELLEE: progressing 5-26-20              4. Pt will report no difficulty with performing her usual work or housework.   Chaya Seema- \"Quite a bit of difficulty\"    PLAN  []  Upgrade activities as tolerated     [x]  Continue plan of care  []  Update interventions per flow sheet       []  Discharge due to:_  []  Other:_      Sri Smith, PT 6/10/2020  9:08 AM    Future Appointments   Date Time Provider Chay Meng   6/12/2020  9:00 AM Chestine Bathe, PT [facility]   6/17/2020  9:00 AM Chestine Bathe, PT [facility]   6/19/2020  9:00 AM Chestine Bathe, PT [facility]   8/5/2020  9:15 AM [facility] NURSE [facility]   8/5/2020  9:35 AM [facility] NURSE VISIT [facility]   8/12/2020  9:15 AM Melanie Ortega MD [address] [facility]

## 2020-06-12 ENCOUNTER — APPOINTMENT (OUTPATIENT)
Dept: PHYSICAL THERAPY | Age: 55
End: 2020-06-12
Payer: OTHER GOVERNMENT

## 2020-06-17 ENCOUNTER — APPOINTMENT (OUTPATIENT)
Dept: PHYSICAL THERAPY | Age: 55
End: 2020-06-17
Payer: OTHER GOVERNMENT

## 2020-06-19 ENCOUNTER — APPOINTMENT (OUTPATIENT)
Dept: PHYSICAL THERAPY | Age: 55
End: 2020-06-19
Payer: OTHER GOVERNMENT

## 2020-06-22 ENCOUNTER — PATIENT MESSAGE (OUTPATIENT)
Dept: FAMILY MEDICINE CLINIC | Age: 55
End: 2020-06-22

## 2020-06-23 NOTE — TELEPHONE ENCOUNTER
Regarding: RE: Update Medical Information  ----- Message from Nikki Mahmood MD sent at 6/23/2020 12:27 PM EDT -----       ----- Message sent from Shahida Sutton LPN to Moise Palma at 6/23/2020 12:06 PM -----   I have sent your message to Dr. Patience Salcedo he will let me know if the visit can be in office or virtual, and then I will call and schedule you. Thank you  Nabil Sainz      ----- Message -----       From:Radha Asif       Sent:6/22/2020 10:28 PM EDT         To:Juan C Martinez MD    [address]    I have been monitoring my blood pressure and it has been 130-150 over . I would like to be seen in person or via an     E-visit. I have taken an extra lisinopril/hctz for the past two evenings. Please schedule me for a visit and provide guidance.     Respectfully,    Selam Maravilla   [phone]

## 2020-06-23 NOTE — TELEPHONE ENCOUNTER
----- Message from Maryan Mercado LPN sent at 8/09/5162 11:16 AM EDT -----  Regarding: FW: Update Medical Information  Contact: [phone]    ----- Message -----  From: Galilea Wyle  Sent: 6/22/2020  10:28 PM EDT  To: Serina Ohara  Subject: Update Medical Information                       I have been monitoring my blood pressure and it has been 130-150 over . I would like to be seen in person or via an     E-visit. I have taken an extra lisinopril/hctz for the past two evenings. Please schedule me for a visit and provide guidance.     Respectfully,    Jeannie Guerra   [phone]

## 2020-06-23 NOTE — TELEPHONE ENCOUNTER
Pt can continue to take 2 tabs lisinopril Hct 20/12.5  Daily and monitor her BP and come in with BP cuff for in office visit at the end of week or early next week.

## 2020-06-24 ENCOUNTER — ANESTHESIA EVENT (OUTPATIENT)
Dept: ENDOSCOPY | Age: 55
End: 2020-06-24
Payer: OTHER GOVERNMENT

## 2020-06-25 ENCOUNTER — ANESTHESIA (OUTPATIENT)
Dept: ENDOSCOPY | Age: 55
End: 2020-06-25
Payer: OTHER GOVERNMENT

## 2020-06-25 ENCOUNTER — HOSPITAL ENCOUNTER (OUTPATIENT)
Age: 55
Setting detail: OUTPATIENT SURGERY
Discharge: HOME | End: 2020-06-25
Attending: INTERNAL MEDICINE | Admitting: INTERNAL MEDICINE
Payer: OTHER GOVERNMENT

## 2020-06-25 VITALS
WEIGHT: 196 LBS | HEART RATE: 59 BPM | TEMPERATURE: 98.2 F | BODY MASS INDEX: 32.65 KG/M2 | DIASTOLIC BLOOD PRESSURE: 76 MMHG | HEIGHT: 65 IN | OXYGEN SATURATION: 99 % | RESPIRATION RATE: 12 BRPM | SYSTOLIC BLOOD PRESSURE: 113 MMHG

## 2020-06-25 PROCEDURE — 76060000031 HC ANESTHESIA FIRST 0.5 HR: Performed by: INTERNAL MEDICINE

## 2020-06-25 PROCEDURE — 76040000019: Performed by: INTERNAL MEDICINE

## 2020-06-25 PROCEDURE — 74011250636 HC RX REV CODE- 250/636: Performed by: NURSE ANESTHETIST, CERTIFIED REGISTERED

## 2020-06-25 PROCEDURE — 88305 TISSUE EXAM BY PATHOLOGIST: CPT

## 2020-06-25 PROCEDURE — 77030029384 HC SNR POLYP CAPTVR II BSC -B: Performed by: INTERNAL MEDICINE

## 2020-06-25 PROCEDURE — 77030008565 HC TBNG SUC IRR ERBE -B: Performed by: INTERNAL MEDICINE

## 2020-06-25 PROCEDURE — 74011250637 HC RX REV CODE- 250/637: Performed by: NURSE ANESTHETIST, CERTIFIED REGISTERED

## 2020-06-25 PROCEDURE — 74011000250 HC RX REV CODE- 250: Performed by: NURSE ANESTHETIST, CERTIFIED REGISTERED

## 2020-06-25 PROCEDURE — 77030018846 HC SOL IRR STRL H20 ICUM -A: Performed by: INTERNAL MEDICINE

## 2020-06-25 RX ORDER — INSULIN LISPRO 100 [IU]/ML
INJECTION, SOLUTION INTRAVENOUS; SUBCUTANEOUS ONCE
Status: DISCONTINUED | OUTPATIENT
Start: 2020-06-25 | End: 2020-06-25 | Stop reason: HOSPADM

## 2020-06-25 RX ORDER — SODIUM CHLORIDE 0.9 % (FLUSH) 0.9 %
5-40 SYRINGE (ML) INJECTION EVERY 8 HOURS
Status: CANCELLED | OUTPATIENT
Start: 2020-06-25

## 2020-06-25 RX ORDER — FAMOTIDINE 20 MG/1
20 TABLET, FILM COATED ORAL ONCE
Status: COMPLETED | OUTPATIENT
Start: 2020-06-25 | End: 2020-06-25

## 2020-06-25 RX ORDER — SODIUM CHLORIDE, SODIUM LACTATE, POTASSIUM CHLORIDE, CALCIUM CHLORIDE 600; 310; 30; 20 MG/100ML; MG/100ML; MG/100ML; MG/100ML
50 INJECTION, SOLUTION INTRAVENOUS CONTINUOUS
Status: DISCONTINUED | OUTPATIENT
Start: 2020-06-25 | End: 2020-06-25 | Stop reason: HOSPADM

## 2020-06-25 RX ORDER — SODIUM CHLORIDE, SODIUM LACTATE, POTASSIUM CHLORIDE, CALCIUM CHLORIDE 600; 310; 30; 20 MG/100ML; MG/100ML; MG/100ML; MG/100ML
50 INJECTION, SOLUTION INTRAVENOUS CONTINUOUS
Status: CANCELLED | OUTPATIENT
Start: 2020-06-25

## 2020-06-25 RX ORDER — SODIUM CHLORIDE 0.9 % (FLUSH) 0.9 %
5-40 SYRINGE (ML) INJECTION AS NEEDED
Status: CANCELLED | OUTPATIENT
Start: 2020-06-25

## 2020-06-25 RX ORDER — LIDOCAINE HYDROCHLORIDE 20 MG/ML
INJECTION, SOLUTION EPIDURAL; INFILTRATION; INTRACAUDAL; PERINEURAL AS NEEDED
Status: DISCONTINUED | OUTPATIENT
Start: 2020-06-25 | End: 2020-06-25 | Stop reason: HOSPADM

## 2020-06-25 RX ORDER — PROPOFOL 10 MG/ML
INJECTION, EMULSION INTRAVENOUS AS NEEDED
Status: DISCONTINUED | OUTPATIENT
Start: 2020-06-25 | End: 2020-06-25 | Stop reason: HOSPADM

## 2020-06-25 RX ADMIN — SODIUM CHLORIDE, SODIUM LACTATE, POTASSIUM CHLORIDE, AND CALCIUM CHLORIDE 50 ML/HR: 600; 310; 30; 20 INJECTION, SOLUTION INTRAVENOUS at 10:45

## 2020-06-25 RX ADMIN — PROPOFOL 40 MG: 10 INJECTION, EMULSION INTRAVENOUS at 12:10

## 2020-06-25 RX ADMIN — PROPOFOL 40 MG: 10 INJECTION, EMULSION INTRAVENOUS at 12:14

## 2020-06-25 RX ADMIN — PROPOFOL 40 MG: 10 INJECTION, EMULSION INTRAVENOUS at 12:18

## 2020-06-25 RX ADMIN — FAMOTIDINE 20 MG: 20 TABLET ORAL at 10:45

## 2020-06-25 RX ADMIN — LIDOCAINE HYDROCHLORIDE 100 MG: 20 INJECTION, SOLUTION EPIDURAL; INFILTRATION; INTRACAUDAL; PERINEURAL at 12:14

## 2020-06-25 RX ADMIN — PROPOFOL 80 MG: 10 INJECTION, EMULSION INTRAVENOUS at 12:03

## 2020-06-25 RX ADMIN — PROPOFOL 40 MG: 10 INJECTION, EMULSION INTRAVENOUS at 12:06

## 2020-06-25 NOTE — DISCHARGE INSTRUCTIONS
Patient Education     DISCHARGE SUMMARY from Nurse    PATIENT INSTRUCTIONS:    After general anesthesia or intravenous sedation, for 24 hours or while taking prescription Narcotics:  · Limit your activities  · Do not drive and operate hazardous machinery  · Do not make important personal or business decisions  · Do  not drink alcoholic beverages  · If you have not urinated within 8 hours after discharge, please contact your surgeon on call. Report the following to your surgeon:  · Excessive pain, swelling, redness or odor of or around the surgical area  · Temperature over 100.5  · Nausea and vomiting lasting longer than 4 hours or if unable to take medications  · Any signs of decreased circulation or nerve impairment to extremity: change in color, persistent  numbness, tingling, coldness or increase pain  · Any questions    What to do at Home:  Recommended activity: Activity as tolerated and no driving for today. *  Please give a list of your current medications to your Primary Care Provider. *  Please update this list whenever your medications are discontinued, doses are      changed, or new medications (including over-the-counter products) are added. *  Please carry medication information at all times in case of emergency situations. These are general instructions for a healthy lifestyle:    No smoking/ No tobacco products/ Avoid exposure to second hand smoke  Surgeon General's Warning:  Quitting smoking now greatly reduces serious risk to your health.     Obesity, smoking, and sedentary lifestyle greatly increases your risk for illness    A healthy diet, regular physical exercise & weight monitoring are important for maintaining a healthy lifestyle    You may be retaining fluid if you have a history of heart failure or if you experience any of the following symptoms:  Weight gain of 3 pounds or more overnight or 5 pounds in a week, increased swelling in our hands or feet or shortness of breath while lying flat in bed. Please call your doctor as soon as you notice any of these symptoms; do not wait until your next office visit. The discharge information has been reviewed with the patient. The patient verbalized understanding.    ___________________________________________________________________________________________________________________________________     Colonoscopy: What to Expect at [address] [place] [address]  After a colonoscopy, you'll stay at the clinic for 1 to 2 hours until the medicines wear off. Then you can go home. But you'll need to arrange for a ride. Your doctor will tell you when you can eat and do your other usual activities. Your doctor will talk to you about when you'll need your next colonoscopy. Your doctor can help you decide how often you need to be checked. This will depend on the results of your test and your risk for colorectal cancer. After the test, you may be bloated or have gas pains. You may need to pass gas. If a biopsy was done or a polyp was removed, you may have streaks of blood in your stool (feces) for a few days. Problems such as heavy rectal bleeding may not occur until several weeks after the test. This isn't common. But it can happen after polyps are removed. This care sheet gives you a general idea about how long it will take for you to recover. But each person recovers at a different pace. Follow the steps below to get better as quickly as possible. How can you care for yourself at home? Activity  · Rest when you feel tired. · You can do your normal activities when it feels okay to do so. Diet  · Follow your doctor's directions for eating. · Unless your doctor has told you not to, drink plenty of fluids. This helps to replace the fluids that were lost during the colon prep. · Do not drink alcohol. Medicines  · Your doctor will tell you if and when you can restart your medicines.  He or she will also give you instructions about taking any new medicines. · If you take aspirin or some other blood thinner, ask your doctor if and when to start taking it again. Make sure that you understand exactly what your doctor wants you to do. · If polyps were removed or a biopsy was done during the test, your doctor may tell you not to take aspirin or other anti-inflammatory medicines for a few days. These include ibuprofen (Advil, Motrin) and naproxen (Aleve). Other instructions  · For your safety, do not drive or operate machinery until the medicine wears off and you can think clearly. Your doctor may tell you not to drive or operate machinery until the day after your test.  · Do not sign legal documents or make major decisions until the medicine wears off and you can think clearly. The anesthesia can make it hard for you to fully understand what you are agreeing to. Follow-up care is a key part of your treatment and safety. Be sure to make and go to all appointments, and call your doctor if you are having problems. It's also a good idea to know your test results and keep a list of the medicines you take. When should you call for help? [id number] anytime you think you may need emergency care. For example, call if:  · You passed out (lost consciousness). · You pass maroon or bloody stools. · You have trouble breathing. Call your doctor now or seek immediate medical care if:  · You have pain that does not get better after you take pain medicine. · You are sick to your stomach or cannot drink fluids. · You have new or worse belly pain. · You have blood in your stools. · You have a fever. · You cannot pass stools or gas. Watch closely for changes in your health, and be sure to contact your doctor if you have any problems. Where can you learn more? Go to http://libby-orlando.info/  Enter E264 in the search box to learn more about \"Colonoscopy: What to Expect at Home. \"  Current as of: August 22, 2019               Content Version: 12.5  © 5153-7682 Healthwise, Incorporated. Care instructions adapted under license by [employer] (which disclaims liability or warranty for this information). If you have questions about a medical condition or this instruction, always ask your healthcare professional. [address] any warranty or liability for your use of this information. MyChart Activation    Thank you for enrolling in [address] Please follow the instructions below to securely access your online medical record. [employer] allows you to send messages to your doctor, view your test results, renew your prescriptions, schedule appointments, and more. How Do I Sign Up? 1. In your internet browser, go to [email or web address]. 2. Click on the First Time User? Click Here link in the Sign In box. You will see the New Member Sign Up page. 3. Enter your [employer] Access Code exactly as it appears below. You will not need to use this code after youve completed the sign-up process. If you do not sign up before the expiration date, you must request a new code. [employer] System[employer] Access Code: Activation code not generated  Current [employer] Status: Active     4. Enter the last four digits of your Social Security Number (xxxx) and Date of Birth (mm/dd/yyyy) as indicated and click Submit. You will be taken to the next sign-up page. 5. Create a [employer] ID. This will be your [employer] login ID and cannot be changed, so think of one that is secure and easy to remember. 6. Create a [employer] password. You can change your password at any time. 7. Enter your Password Reset Question and Answer. This can be used at a later time if you forget your password. 8. Enter your e-mail address. You will receive e-mail notification when new information is available in [address] 9. Click Sign Up. You can now view your medical record. Additional Information    Remember, [employer] is NOT to be used for urgent needs.  For medical emergencies, dial 911. Now available from your iPhone and Android! Patient Education        Colon Polyps: Care Instructions  Your Care Instructions     Colon polyps are growths in the colon or the rectum. The cause of most colon polyps is not known, and most people who get them do not have any problems. But a certain kind can turn into cancer. For this reason, regular testing for colon polyps is important for people as they get older. It is also important for anyone who has an increased risk for colon cancer. Polyps are usually found through routine colon cancer screening tests. Although most colon polyps are not cancerous, they are usually removed and then tested for cancer. Screening for colon cancer saves lives because the cancer can usually be cured if it is caught early. If you have a polyp that is the type that can turn into cancer, you may need more tests to examine your entire colon. The doctor will remove any other polyps that he or she finds, and you will be tested more often. Follow-up care is a key part of your treatment and safety. Be sure to make and go to all appointments, and call your doctor if you are having problems. It's also a good idea to know your test results and keep a list of the medicines you take. How can you care for yourself at home? Regular exams to look for colon polyps are the best way to prevent polyps from turning into colon cancer. These can include stool tests, sigmoidoscopy, colonoscopy, and CT colonography. Talk with your doctor about a testing schedule that is right for you. To prevent polyps  There is no home treatment that can prevent colon polyps. But these steps may help lower your risk for cancer. · Stay active. Being active can help you get to and stay at a healthy weight. Try to exercise on most days of the week. Walking is a good choice. · Eat well. Choose a variety of vegetables, fruits, legumes (such as peas and beans), fish, poultry, and whole grains.   · Do not smoke. If you need help quitting, talk to your doctor about stop-smoking programs and medicines. These can increase your chances of quitting for good. · If you drink alcohol, limit how much you drink. Limit alcohol to 2 drinks a day for men and 1 drink a day for women. When should you call for help? Call your doctor now or seek immediate medical care if:  · You have severe belly pain. · Your stools are maroon or very bloody. Watch closely for changes in your health, and be sure to contact your doctor if:  · You have a fever. · You have nausea or vomiting. · You have a change in bowel habits (new constipation or diarrhea). · Your symptoms get worse or are not improving as expected. Where can you learn more? Go to http://libby-orlando.info/  Enter C571 in the search box to learn more about \"Colon Polyps: Care Instructions. \"  Current as of: August 22, 2019               Content Version: 12.5  © 3481-1410 Healthwise, Incorporated. Care instructions adapted under license by [employer] (which disclaims liability or warranty for this information). If you have questions about a medical condition or this instruction, always ask your healthcare professional. [address] any warranty or liability for your use of this information.

## 2020-06-25 NOTE — ANESTHESIA POSTPROCEDURE EVALUATION
Procedure(s):  COLONOSCOPY with Polypectomies. MAC    Anesthesia Post Evaluation      Multimodal analgesia: multimodal analgesia used between 6 hours prior to anesthesia start to PACU discharge  Patient location during evaluation: bedside  Patient participation: complete - patient participated  Level of consciousness: awake  Pain management: adequate  Airway patency: patent  Anesthetic complications: no  Cardiovascular status: stable  Respiratory status: acceptable  Hydration status: acceptable  Post anesthesia nausea and vomiting:  controlled      INITIAL Post-op Vital signs:   Vitals Value Taken Time   /67 6/25/2020 12:56 PM   Temp 36.4 °C (97.5 °F) 6/25/2020 12:29 PM   Pulse 57 6/25/2020 12:59 PM   Resp 8 6/25/2020 12:59 PM   SpO2 100 % 6/25/2020 12:59 PM   Vitals shown include unvalidated device data.

## 2020-06-25 NOTE — H&P
[email or web address]  [phone]      Impression:   1. Average risk colon cancer screening exam      Plan:     1.  Colonoscopy      Addendum: All lab tests orders pertaining to the procedure have been ordered by the anesthesia personnel and results will be addressed by the anesthesia team    Chief Complaint: Average risk colon cancer screening exam.      HPI:  Dari Hill is a 53 y.o. female who is being seen on consult for average risk colon cancer screening with colonoscopy    PMH:   Past Medical History:   Diagnosis Date    Adverse effect of anesthesia     Hypothermia    Cardiomegaly     Diverticulosis     GERD (gastroesophageal reflux disease)     H/O tubal ligation     H/O: hysterectomy     Hiatal hernia 2007    History of appendectomy     Hypercholesterolemia     Hypertension     Obesity, unspecified     SUBHASH on CPAP 4/8/2013    S/P mastectomy, bilateral     2010 : preventative    Undiagnosed cardiac murmurs     Unspecified adverse effect of anesthesia     was hypothermic in past       PSH:   Past Surgical History:   Procedure Laterality Date    HX BREAST RECONSTRUCTION  2011    nipple reconstruction 2011    HX CERVICAL FUSION  2018    HX HYSTERECTOMY  2003    HX MASTECTOMY  2010    bilateral (Preventive)    HX OOPHORECTOMY  2010       Social HX:   Social History     Socioeconomic History    Marital status:      Spouse name: Not on file    Number of children: Not on file    Years of education: Not on file    Highest education level: Not on file   Occupational History    Not on file   Social Needs    Financial resource strain: Not on file    Food insecurity     Worry: Not on file     Inability: Not on file    Transportation needs     Medical: Not on file     Non-medical: Not on file   Tobacco Use    Smoking status: Never Smoker    Smokeless tobacco: Never Used   Substance and Sexual Activity    Alcohol use: No     Alcohol/week: 0.0 standard drinks    Drug use: No     Types: Prescription, OTC    Sexual activity: Yes     Partners: Male     Birth control/protection: Surgical   Lifestyle    Physical activity     Days per week: Not on file     Minutes per session: Not on file    Stress: Not on file   Relationships    Social connections     Talks on phone: Not on file     Gets together: Not on file     Attends Buddhist service: Not on file     Active member of club or organization: Not on file     Attends meetings of clubs or organizations: Not on file     Relationship status: Not on file    Intimate partner violence     Fear of current or ex partner: Not on file     Emotionally abused: Not on file     Physically abused: Not on file     Forced sexual activity: Not on file   Other Topics Concern    Not on file   Social History Narrative    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 [employer]       [address] [place],[address]:   Family History   Problem Relation Age of Onset    Cancer Mother         breast    Cancer Sister         breast    Diabetes Maternal Grandmother     Hypertension Maternal Grandmother     Cancer Maternal Grandmother         ovarian    Heart Disease Maternal Grandfather 61        MI    Hypertension Maternal Aunt     Other Maternal Aunt         renal failure    Hypertension Maternal Uncle        Allergy:   Allergies   Allergen Reactions    Bactrim [Sulfamethoprim Ds] Other (comments)     Made mouth feel thick, speech slurred       Home Medications:     Medications Prior to Admission   Medication Sig    simvastatin (ZOCOR) 20 mg tablet TAKE 1 TABLET BY MOUTH EVERYDAY AT BEDTIME    fluticasone propionate (FLONASE) 50 mcg/actuation nasal spray 2 Sprays by Both Nostrils route daily.  levalbuterol tartrate (XOPENEX) 45 mcg/actuation inhaler INHALE 1-2 PUFFS EVERY 4 TO 6 HOURS AS NEEDED    metFORMIN (GLUCOPHAGE) 500 mg tablet Take 1 Tab by mouth two (2) times daily (with meals).     lisinopril-hydroCHLOROthiazide (PRINZIDE, ZESTORETIC) 20-12.5 mg per tablet TAKE 1 TABLET BY MOUTH EVERY DAY    cloNIDine (CATAPRES) 0.1 mg/24 hr ptwk APPLY 1 PATCH TO SKIN AND CHANGE EVERY 7 DAYS    omeprazole (PRILOSEC) 40 mg capsule Take 40 mg by mouth daily.  cyclobenzaprine (FLEXERIL) 5 mg tablet TAKE 1-2 TABLETS BY MOUTH AT NIGHT.  YUVAFEM 10 mcg tab vaginal tablet INSERT 1 APPLICATORFUL VAGINALLY AT BEDTIME FOR 14 DAYS THEN TWICE PER WEEK    LACTOBACILLUS ACIDOPHILUS (PROBIOTIC PO) Take  by mouth.  valACYclovir (VALTREX) 500 mg tablet Take 500 mg by mouth daily.  vitamin e 400 unit tab Take 1 Cap by mouth daily.  Cholecalciferol, Vitamin D3, (VITAMIN D) 2,000 unit cap capsule Take 2,000 Units by mouth daily.  cpap machine kit by Does Not Apply route. Replace mask, headgear, heated hose, filters and accessories as needed for one year. CPAP pressure: 9 cwp  Mask: standard Mirage FX plus Opus nasal pillows mask, or mask of choice  Homecare Company: MED  Download data 30 days after initiation of therapy    sodium chloride (Saline Nasal Mist) 0.65 % nasal squeeze bottle 0.05 mL by Both Nostrils route as needed for Congestion. Review of Systems:     Constitutional: No fevers, chills, weight loss, fatigue. Skin: No rashes, pruritis, jaundice, ulcerations, erythema. HENT: No headaches, nosebleeds, sinus pressure, rhinorrhea, sore throat. Eyes: No visual changes, blurred vision, eye pain, photophobia, jaundice. Cardiovascular: No chest pain, heart palpitations. Respiratory: No cough, SOB, wheezing, chest discomfort, orthopnea. Gastrointestinal:    Genitourinary: No dysuria, bleeding, discharge, pyuria. Musculoskeletal: No weakness, arthralgias, wasting. Endo: No sweats. Heme: No bruising, easy bleeding. Allergies: As noted. Neurological: Cranial nerves intact. Alert and oriented. Gait not assessed. Psychiatric:  No anxiety, depression, hallucinations.                  Visit Vitals  BP (!) 119/92   Pulse 74   Temp 98 °F (36.7 °C)   Resp 18   Ht 5' 5\" (1.651 m)   Wt 88.9 kg (196 lb) SpO2 98%   Breastfeeding No   BMI 32.62 kg/m²       Physical Assessment:     constitutional: appearance: well developed, well nourished, normal habitus, no deformities, in no acute distress. skin: inspection: no rashes, ulcers, icterus or other lesions; no clubbing or telangiectasias. palpation: no induration or subcutaneos nodules. eyes: inspection: normal conjunctivae and lids; no jaundice pupils: symmetrical, normoreactive to light, normal accommodation and size. ENMT: mouth: normal oral mucosa,lips and gums; good dentition. oropharynx: normal tongue, hard and soft palate; posterior pharynx without erithema, exudate or lesions. neck: thyroid: normal size, consistency and position; no masses or tenderness. respiratory: effort: normal chest excursion; no intercostal retraction or accessory muscle use. cardiovascular: abdominal aorta: normal size and position; no bruits. palpation: PMI of normal size and position; normal rhythm; no thrill or murmurs. abdominal: abdomen: normal consistency; no tenderness or masses. hernias: no hernias appreciated. liver: normal size and consistency. spleen: not palpable. rectal: hemoccult/guaiac: not performed. musculoskeletal: digits and nails: no clubbing, cyanosis, petechiae or other inflammatory conditions. gait: normal gait and station head and neck: normal range of motion; no pain, crepitation or contracture. spine/ribs/pelvis: normal range of motion; no pain, deformity or contracture. lymphatic: axilae: not palpable. groin: not palpable. neck: within normal limits. other: not palpable. neurologic: cranial nerves: II-XII normal.   psychiatric: judgement/insight: within normal limits. memory: within normal limits for recent and remote events. mood and affect: no evidence of depression, anxiety or agitation. orientation: oriented to time, space and person.         Basic Metabolic Profile   No results for input(s): NA, K, CL, CO2, BUN, GLU, CA, MG, PHOS in the last 72 hours. No lab exists for component: CREAT      CBC w/Diff    No results for input(s): WBC, RBC, HGB, HCT, MCV, MCH, MCHC, RDW, PLT, HGBEXT, HCTEXT, PLTEXT in the last 72 hours. No lab exists for component: MPV No results for input(s): GRANS, LYMPH, EOS, PRO, MYELO, METAS, BLAST in the last 72 hours. No lab exists for component: MONO, BASO     Hepatic Function   No results for input(s): ALB, TP, TBILI, AP, AML, LPSE in the last 72 hours. No lab exists for component: DBILI, GPT, SGOT       Marcel Nayak MD, M.D. [facility], [address] [facility]  [email or web address]

## 2020-06-25 NOTE — PERIOP NOTES
Patient discharged from facility via wheelchair. Patient has discharge in hand. Patient armband removed and shredded.

## 2020-06-25 NOTE — PROGRESS NOTES
[email or web address] [address]  [place], [address]      Brief Procedure Note    Leesa Benitezat  1965  [id number]    Date of Procedure: 6/25/2020    Preoperative diagnosis: -    Postoperative diagnosis: Cecum Polyp, Transverse Colon Polyp, Hemorrhoids    Type of Anesthesia: MAC (Monitored anesthesia care)    Description of findings: same as post op dx    Procedure: Procedure(s):  COLONOSCOPY with Polypectomies    :  Dr. Candie Ramsey MD    Assistant(s): Endoscopy Technician-1: Adela Sharma  Endoscopy RN-1: Cresencio Reyes RN; Jazzy Kelly RN  Float Staff: Nikita Acuna RN    Devices/implants/grafts/tissues/prosthesis: None    EBL:None    Specimens:   ID Type Source Tests Collected by Time Destination   1 : Cecum Polyp Preservative Cecum  Madhavi Stallings MD 6/25/2020 1211 Pathology   2 : Transverse polyp Preservative Colon, Donnald Sublette  Madhavi Stallings MD 6/25/2020 1216 Pathology       Findings: See printed and scanned procedure note    Complications: None    Dr. Candie Ramsey MD  6/25/2020  12:23 PM

## 2020-06-25 NOTE — ANESTHESIA PREPROCEDURE EVALUATION
Relevant Problems   No relevant active problems       Anesthetic History   No history of anesthetic complications            Review of Systems / Medical History  Patient summary reviewed and pertinent labs reviewed    Pulmonary        Sleep apnea           Neuro/Psych              Cardiovascular    Hypertension                   GI/Hepatic/Renal     GERD           Endo/Other        Morbid obesity     Other Findings              Physical Exam    Airway  Mallampati: III  TM Distance: 4 - 6 cm  Neck ROM: decreased range of motion   Mouth opening: Diminished (comment)     Cardiovascular    Rhythm: regular  Rate: normal         Dental    Dentition: Poor dentition     Pulmonary  Breath sounds clear to auscultation               Abdominal  GI exam deferred       Other Findings            Anesthetic Plan    ASA: 2  Anesthesia type: MAC            Anesthetic plan and risks discussed with: Patient

## 2020-06-26 ENCOUNTER — OFFICE VISIT (OUTPATIENT)
Dept: INTERNAL MEDICINE CLINIC | Age: 55
End: 2020-06-26

## 2020-06-26 VITALS
RESPIRATION RATE: 18 BRPM | BODY MASS INDEX: 32.49 KG/M2 | TEMPERATURE: 98.8 F | DIASTOLIC BLOOD PRESSURE: 86 MMHG | HEART RATE: 77 BPM | SYSTOLIC BLOOD PRESSURE: 134 MMHG | WEIGHT: 195 LBS | HEIGHT: 65 IN

## 2020-06-26 DIAGNOSIS — I10 ESSENTIAL HYPERTENSION: Primary | ICD-10-CM

## 2020-06-26 RX ORDER — LISINOPRIL AND HYDROCHLOROTHIAZIDE 12.5; 2 MG/1; MG/1
TABLET ORAL
Qty: 180 TAB | Refills: 2 | Status: SHIPPED | OUTPATIENT
Start: 2020-06-26 | End: 2020-08-10 | Stop reason: SDUPTHER

## 2020-06-26 NOTE — PROGRESS NOTES
Patient is in the office today for elevated blood pressure. .      1. Have you been to the ER, urgent care clinic since your last visit? Hospitalized since your last visit? No    2. Have you seen or consulted any other health care providers outside of the [address] [place] [address] since your last visit? Include any pap smears or colon screening.  No

## 2020-06-28 NOTE — PROGRESS NOTES
Lindsey Michael is a 53 y.o.  female and presents with Blood Pressure Check and Hypertension      SUBJECTIVE:    Patient's hypertension has not been optimally controlled on Zestoretic 20/12.51 tab daily. She increased the dose to 2 tablets daily in the last week and blood pressure is better in the office today. Patient will continue to monitor blood pressure at home and work on exercise and weight loss. Respiratory ROS: negative for - shortness of breath  Cardiovascular ROS: negative for - chest pain    Current Outpatient Medications   Medication Sig    lisinopril-hydroCHLOROthiazide (PRINZIDE, ZESTORETIC) 20-12.5 mg per tablet TAKE 1 TABLET BY MOUTH BID    simvastatin (ZOCOR) 20 mg tablet TAKE 1 TABLET BY MOUTH EVERYDAY AT BEDTIME    sodium chloride (Saline Nasal Mist) 0.65 % nasal squeeze bottle 0.05 mL by Both Nostrils route as needed for Congestion.  fluticasone propionate (FLONASE) 50 mcg/actuation nasal spray 2 Sprays by Both Nostrils route daily.  levalbuterol tartrate (XOPENEX) 45 mcg/actuation inhaler INHALE 1-2 PUFFS EVERY 4 TO 6 HOURS AS NEEDED    metFORMIN (GLUCOPHAGE) 500 mg tablet Take 1 Tab by mouth two (2) times daily (with meals).  cloNIDine (CATAPRES) 0.1 mg/24 hr ptwk APPLY 1 PATCH TO SKIN AND CHANGE EVERY 7 DAYS    omeprazole (PRILOSEC) 40 mg capsule Take 40 mg by mouth daily.  cyclobenzaprine (FLEXERIL) 5 mg tablet TAKE 1-2 TABLETS BY MOUTH AT NIGHT.  YUVAFEM 10 mcg tab vaginal tablet INSERT 1 APPLICATORFUL VAGINALLY AT BEDTIME FOR 14 DAYS THEN TWICE PER WEEK    LACTOBACILLUS ACIDOPHILUS (PROBIOTIC PO) Take  by mouth.  valACYclovir (VALTREX) 500 mg tablet Take 500 mg by mouth daily.  vitamin e 400 unit tab Take 1 Cap by mouth daily.  Cholecalciferol, Vitamin D3, (VITAMIN D) 2,000 unit cap capsule Take 2,000 Units by mouth daily.  cpap machine kit by Does Not Apply route. Replace mask, headgear, heated hose, filters and accessories as needed for one year. CPAP pressure: 9 cwp  Mask: standard Mirage FX plus Opus nasal pillows mask, or mask of choice  [employer]: [employer]  Download data 30 days after initiation of therapy     No current facility-administered medications for this visit. OBJECTIVE:  alert, well appearing, and in no distress  Visit Vitals  /86   Pulse 77   Temp 98.8 °F (37.1 °C) (Oral)   Resp 18   Ht 5' 5\" (1.651 m)   Wt 195 lb (88.5 kg)   BMI 32.45 kg/m²      well developed and well nourished            Discussed the patient's BMI with her. The BMI follow up plan is as follows: I have counseled this patient on diet and exercise regimens. Assessment/Plan      ICD-10-CM ICD-9-CM    1. Essential hypertension I10 401.9 improving on 2 lisinopril-hydroCHLOROthiazide (PRINZIDE, ZESTORETIC) 20-12.5 mg per tablet daily      Follow-up and Dispositions    · Return if symptoms worsen or fail to improve. Reviewed plan of care. Patient has provided input and agrees with goals.

## 2020-07-08 NOTE — PROGRESS NOTES
[facility]  [address] [facility] [address]  [place], [address]  [phone] fax    Physical Therapy Discharge Summary  Patient name: Donna Wilson Start of Care: 2020   Referral source: Adryan Gomez MD : [id number]                Medical Diagnosis: Low back pain [M54.5]  Payor: Shital Constantino / Plan: Jessica Christineleonardo / Product Type: Commerical /  Onset Date:2020                Treatment Diagnosis: right sided LBP/SI dysfunction   Prior Hospitalization: see medical history Provider#: [id number]   Medications: Verified on Patient summary List    Comorbidities: HTN, c/s surgery   Prior Level of Function: functionally I with daily tasks, works as a nurse                   Visits from [facility]: 6    Missed Visits: 2  Reporting Period : 20 to 6-10-20      Summary of Care: The pt was making significant progress with PT but did not complete her final sessions. At the time of the her last session she reported 2/10 pain level. She is being discharged from PT at this time. PT was unable to fully reassess due to unexpected discharge. Progress towards goals:  Short Term Goals: To be accomplished in 1 weeks:              1. Pt will be I and compliant with HEP              IE- issued HEP               KNTGFCH: MET 20  Long Term Goals: To be accomplished in 4 weeks:              1. Improve FOTO score to 61 for improved ability for daily activities.             SZ- 39              2. Pt will demonstrate 4+/5 right glute max strength to improve ability for standing and other daily tasks.               TL: 4-/5              Current: 4-/5 on 20              3. Pt will report at least 75% improvement to improve her ability for work activities.                KL- none              RUHTHUX: progressing 20              4. Pt will report no difficulty with performing her usual work or housework.   Sylvia Roe- \"Quite a bit of difficulty\"      ASSESSMENT/RECOMMENDATIONS:  [x]Discontinue therapy: [x]Patient has reached or is progressing toward set goals      [x]Patient is non-compliant or has abdicated      []Due to lack of appreciable progress towards set Krys 71, PT 7/8/2020 2:50 PM

## 2020-07-10 NOTE — PROGRESS NOTES
FYI only.    Pt calling wanted Dr Mariaelena Luong to be aware she is applying for health insurance and was advised the [employer] ins co will be contacting Dr Mariaelena Luong sometime in the next month    Pt ph [phone] Patient called again, still in bathroom

## 2020-07-19 RX ORDER — CLONIDINE 0.1 MG/24H
PATCH, EXTENDED RELEASE TRANSDERMAL
Qty: 12 PATCH | Refills: 3 | Status: SHIPPED | OUTPATIENT
Start: 2020-07-19 | End: 2021-06-22

## 2020-08-05 ENCOUNTER — APPOINTMENT (OUTPATIENT)
Dept: INTERNAL MEDICINE CLINIC | Age: 55
End: 2020-08-05

## 2020-08-05 DIAGNOSIS — I10 ESSENTIAL HYPERTENSION: ICD-10-CM

## 2020-08-05 DIAGNOSIS — R73.03 PREDIABETES: ICD-10-CM

## 2020-08-05 DIAGNOSIS — E55.9 VITAMIN D DEFICIENCY: ICD-10-CM

## 2020-08-05 DIAGNOSIS — E78.00 PURE HYPERCHOLESTEROLEMIA: ICD-10-CM

## 2020-08-06 LAB
25(OH)D3 SERPL-MCNC: 43 NG/ML (ref 30–100)
ALB/GLOBRATIO, 58C: 1.4 (CALC) (ref 1–2.5)
ALBUMIN SERPL-MCNC: 4.4 G/DL (ref 3.6–5.1)
ALKALINE PHOSPHATASE, TOTAL, 25002000: 66 U/L (ref 37–153)
ALT SERPL-CCNC: 20 U/L (ref 6–29)
AST SERPL W P-5'-P-CCNC: 23 U/L (ref 10–35)
BILIRUB SERPL-MCNC: 0.4 MG/DL (ref 0.2–1.2)
BUN SERPL-MCNC: 13 MG/DL (ref 7–25)
BUN/CREATININE RATIO,BUCR: NORMAL (CALC) (ref 6–22)
CALCIUM SERPL-MCNC: 10 MG/DL (ref 8.6–10.4)
CHLORIDE SERPL-SCNC: 104 MMOL/L (ref 98–110)
CHOL/HDL RATIO,CHHDX: 3.2 (CALC)
CHOLEST SERPL-MCNC: 163 MG/DL
CO2 SERPL-SCNC: 25 MMOL/L (ref 20–32)
CREAT SERPL-MCNC: 1 MG/DL (ref 0.5–1.05)
GLOBULIN,GLOB: 3.2 G/DL (CALC) (ref 1.9–3.7)
GLUCOSE SERPL-MCNC: 92 MG/DL (ref 65–99)
HBA1C MFR BLD HPLC: 5.5 % OF TOTAL HGB
HDLC SERPL-MCNC: 51 MG/DL
LDL-CHOLESTEROL: 92 MG/DL (CALC)
NON-HDL CHOLESTEROL, 011976: 112 MG/DL (CALC)
POTASSIUM SERPL-SCNC: 4 MMOL/L (ref 3.5–5.3)
PROT SERPL-MCNC: 7.6 G/DL (ref 6.1–8.1)
SODIUM SERPL-SCNC: 141 MMOL/L (ref 135–146)
TRIGL SERPL-MCNC: 104 MG/DL (ref ?–150)

## 2020-08-10 DIAGNOSIS — I10 ESSENTIAL HYPERTENSION: ICD-10-CM

## 2020-08-10 RX ORDER — LISINOPRIL AND HYDROCHLOROTHIAZIDE 12.5; 2 MG/1; MG/1
TABLET ORAL
Qty: 180 TAB | Refills: 2 | Status: SHIPPED | OUTPATIENT
Start: 2020-08-10 | End: 2021-09-17

## 2020-08-10 NOTE — TELEPHONE ENCOUNTER
PCP: Zac Borrero MD    Last appt: 8/5/2020  No future appointments.     Requested Prescriptions     Pending Prescriptions Disp Refills    lisinopril-hydroCHLOROthiazide (PRINZIDE, ZESTORETIC) 20-12.5 mg per tablet 180 Tab 2     Sig: TAKE 1 TABLET BY MOUTH BID

## 2020-09-11 ENCOUNTER — HOSPITAL ENCOUNTER (OUTPATIENT)
Dept: BONE DENSITY | Age: 55
Discharge: HOME | End: 2020-09-11
Attending: INTERNAL MEDICINE
Payer: OTHER GOVERNMENT

## 2020-09-11 DIAGNOSIS — M81.0 POSTMENOPAUSAL BONE LOSS: ICD-10-CM

## 2020-09-11 PROCEDURE — 77080 DXA BONE DENSITY AXIAL: CPT

## 2020-09-14 NOTE — PROGRESS NOTES
Left message for patient message will be sent via my chart.    Re:  Tell patient his/her bone density is normal

## 2020-10-01 ENCOUNTER — OFFICE VISIT (OUTPATIENT)
Dept: INTERNAL MEDICINE CLINIC | Age: 55
End: 2020-10-01
Payer: OTHER GOVERNMENT

## 2020-10-01 VITALS
TEMPERATURE: 97.6 F | SYSTOLIC BLOOD PRESSURE: 135 MMHG | DIASTOLIC BLOOD PRESSURE: 89 MMHG | OXYGEN SATURATION: 99 % | RESPIRATION RATE: 20 BRPM | BODY MASS INDEX: 32.49 KG/M2 | WEIGHT: 195 LBS | HEIGHT: 65 IN | HEART RATE: 81 BPM

## 2020-10-01 DIAGNOSIS — E55.9 VITAMIN D DEFICIENCY: ICD-10-CM

## 2020-10-01 DIAGNOSIS — I10 ESSENTIAL HYPERTENSION: Primary | ICD-10-CM

## 2020-10-01 DIAGNOSIS — E78.00 PURE HYPERCHOLESTEROLEMIA: ICD-10-CM

## 2020-10-01 DIAGNOSIS — R73.03 PREDIABETES: ICD-10-CM

## 2020-10-01 DIAGNOSIS — Z23 NEEDS FLU SHOT: ICD-10-CM

## 2020-10-01 PROCEDURE — 90686 IIV4 VACC NO PRSV 0.5 ML IM: CPT | Performed by: INTERNAL MEDICINE

## 2020-10-01 PROCEDURE — 99213 OFFICE O/P EST LOW 20 MIN: CPT | Performed by: INTERNAL MEDICINE

## 2020-10-01 PROCEDURE — 90471 IMMUNIZATION ADMIN: CPT | Performed by: INTERNAL MEDICINE

## 2020-10-01 NOTE — PROGRESS NOTES
Patient is in the office today for high blood pressure readings. 1. Have you been to the ER, urgent care clinic since your last visit? Hospitalized since your last visit? No    2. Have you seen or consulted any other health care providers outside of the [address] [place] [address] since your last visit? Include any pap smears or colon screening. yes, Dr. Sherren Najjar and Dr. Aubrie Pollard. Verbal order read back per Dr. Oren Chaudhry flu vaccine. Patient received flu vaccine in right deltoid. Patient was observed and no signs or symptoms of allergic reaction noted at this time. Patient tolerated well and left without complaints. Patient received flu VIS.

## 2020-10-01 NOTE — PATIENT INSTRUCTIONS
High Blood Pressure: Care Instructions  Overview     It's normal for blood pressure to go up and down throughout the day. But if it stays up, you have high blood pressure. Another name for high blood pressure is hypertension. Despite what a lot of people think, high blood pressure usually doesn't cause headaches or make you feel dizzy or lightheaded. It usually has no symptoms. But it does increase your risk of stroke, heart attack, and other problems. You and your doctor will talk about your risks of these problems based on your blood pressure. Your doctor will give you a goal for your blood pressure. Your goal will be based on your health and your age. Lifestyle changes, such as eating healthy and being active, are always important to help lower blood pressure. You might also take medicine to reach your blood pressure goal.  Follow-up care is a key part of your treatment and safety. Be sure to make and go to all appointments, and call your doctor if you are having problems. It's also a good idea to know your test results and keep a list of the medicines you take. How can you care for yourself at home? Medical treatment  · If you stop taking your medicine, your blood pressure will go back up. You may take one or more types of medicine to lower your blood pressure. Be safe with medicines. Take your medicine exactly as prescribed. Call your doctor if you think you are having a problem with your medicine. · Talk to your doctor before you start taking aspirin every day. Aspirin can help certain people lower their risk of a heart attack or stroke. But taking aspirin isn't right for everyone, because it can cause serious bleeding. · See your doctor regularly. You may need to see the doctor more often at first or until your blood pressure comes down. · If you are taking blood pressure medicine, talk to your doctor before you take decongestants or anti-inflammatory medicine, such as ibuprofen.  Some of these medicines can raise blood pressure. · Learn how to check your blood pressure at home. Lifestyle changes  · Stay at a healthy weight. This is especially important if you put on weight around the waist. Losing even 10 pounds can help you lower your blood pressure. · If your doctor recommends it, get more exercise. Walking is a good choice. Bit by bit, increase the amount you walk every day. Try for at least 30 minutes on most days of the week. You also may want to swim, bike, or do other activities. · Avoid or limit alcohol. Talk to your doctor about whether you can drink any alcohol. · Try to limit how much sodium you eat to less than 2,300 milligrams (mg) a day. Your doctor may ask you to try to eat less than 1,500 mg a day. · Eat plenty of fruits (such as bananas and oranges), vegetables, legumes, whole grains, and low-fat dairy products. · Lower the amount of saturated fat in your diet. Saturated fat is found in animal products such as milk, cheese, and meat. Limiting these foods may help you lose weight and also lower your risk for heart disease. · Do not smoke. Smoking increases your risk for heart attack and stroke. If you need help quitting, talk to your doctor about stop-smoking programs and medicines. These can increase your chances of quitting for good. When should you call for help? Call  911 anytime you think you may need emergency care. This may mean having symptoms that suggest that your blood pressure is causing a serious heart or blood vessel problem. Your blood pressure may be over 180/120. For example, call 911 if:    · You have symptoms of a heart attack. These may include:  ? Chest pain or pressure, or a strange feeling in the chest.  ? Sweating. ? Shortness of breath. ? Nausea or vomiting. ? Pain, pressure, or a strange feeling in the back, neck, jaw, or upper belly or in one or both shoulders or arms. ? Lightheadedness or sudden weakness.   ? A fast or irregular heartbeat.     · You have symptoms of a stroke. These may include:  ? Sudden numbness, tingling, weakness, or loss of movement in your face, arm, or leg, especially on only one side of your body. ? Sudden vision changes. ? Sudden trouble speaking. ? Sudden confusion or trouble understanding simple statements. ? Sudden problems with walking or balance. ? A sudden, severe headache that is different from past headaches.     · You have severe back or belly pain. Do not wait until your blood pressure comes down on its own. Get help right away. Call your doctor now or seek immediate care if:    · Your blood pressure is much higher than normal (such as 180/120 or higher), but you don't have symptoms.     · You think high blood pressure is causing symptoms, such as:  ? Severe headache.  ? Blurry vision. Watch closely for changes in your health, and be sure to contact your doctor if:    · Your blood pressure measures higher than your doctor recommends at least 2 times. That means the top number is higher or the bottom number is higher, or both.     · You think you may be having side effects from your blood pressure medicine. Where can you learn more? Go to http://www.gray.com/  Enter [id number] in the search box to learn more about \"High Blood Pressure: Care Instructions. \"  Current as of: December 16, 2019               Content Version: 12.6  © 7146-7363 [employer]. Care instructions adapted under license by [employer] (which disclaims liability or warranty for this information). If you have questions about a medical condition or this instruction, always ask your healthcare professional. [place] [address] any warranty or liability for your use of this information.

## 2020-10-10 NOTE — PROGRESS NOTES
Vicente Patel is a 53 y.o.  female and presents with Hypertension and Immunization/Injection (flu vaccine)      SUBJECTIVE:    Patient was concerned about her blood pressure being elevated recently. After talking to her it sounds like it is elevated when she eats foods that have a lot of salt. Either way patient's blood pressure has improved with taking lisinopril HCT 20/12.5, 2 tablets daily. Patient will continue to work on improving her diet and losing weight and cutting back salt. She will monitor her blood pressure and if it is good with her avoiding salt she can continue Zestoretic 20/12.51 daily otherwise she will increase to twice daily if blood pressure remains elevated. Respiratory ROS: negative for - shortness of breath  Cardiovascular ROS: negative for - chest pain    Current Outpatient Medications   Medication Sig    lisinopril-hydroCHLOROthiazide (PRINZIDE, ZESTORETIC) 20-12.5 mg per tablet TAKE 1 TABLET BY MOUTH BID    levalbuterol tartrate (XOPENEX) 45 mcg/actuation inhaler INHALE 1-2 PUFFS BY MOUTH EVERY 4 TO 6 HOURS AS NEEDED    cloNIDine (CATAPRES) 0.1 mg/24 hr ptwk APPLY 1 PATCH TO SKIN AND CHANGE EVERY 7 DAYS    simvastatin (ZOCOR) 20 mg tablet TAKE 1 TABLET BY MOUTH EVERYDAY AT BEDTIME    sodium chloride (Saline Nasal Mist) 0.65 % nasal squeeze bottle 0.05 mL by Both Nostrils route as needed for Congestion.  fluticasone propionate (FLONASE) 50 mcg/actuation nasal spray 2 Sprays by Both Nostrils route daily.  cyclobenzaprine (FLEXERIL) 5 mg tablet TAKE 1-2 TABLETS BY MOUTH AT NIGHT.  YUVAFEM 10 mcg tab vaginal tablet INSERT 1 APPLICATORFUL VAGINALLY AT BEDTIME FOR 14 DAYS THEN TWICE PER WEEK    LACTOBACILLUS ACIDOPHILUS (PROBIOTIC PO) Take  by mouth.  valACYclovir (VALTREX) 500 mg tablet Take 500 mg by mouth daily.  vitamin e 400 unit tab Take 1 Cap by mouth daily.  Cholecalciferol, Vitamin D3, (VITAMIN D) 2,000 unit cap capsule Take 2,000 Units by mouth daily.  cpap machine kit by Does Not Apply route. Replace mask, headgear, heated hose, filters and accessories as needed for one year. CPAP pressure: 9 cwp  Mask: standard Mirage FX plus Opus nasal pillows mask, or mask of choice  [employer] Company: [employer]  Download data 30 days after initiation of therapy    metFORMIN (GLUCOPHAGE) 500 mg tablet Take 1 Tab by mouth two (2) times daily (with meals).  omeprazole (PRILOSEC) 40 mg capsule Take 40 mg by mouth daily. No current facility-administered medications for this visit. OBJECTIVE:  alert, well appearing, and in no distress  Visit Vitals  /89 (BP 1 Location: Left arm, BP Patient Position: Sitting)   Pulse 81   Temp 97.6 °F (36.4 °C) (Temporal)   Resp 20   Ht 5' 5\" (1.651 m)   Wt 195 lb (88.5 kg)   SpO2 99%   BMI 32.45 kg/m²      well developed and well nourished            Discussed the patient's BMI with her. The BMI follow up plan is as follows: I have counseled this patient on diet and exercise regimens. Assessment/Plan      ICD-10-CM ICD-9-CM    1. Essential hypertension  I10 401.9  controlled on Zestoretic 20/12.51 tab twice daily. Patient will try and avoid salt in her diet and if blood pressure goes down she can decrease to 1 tab a day. 2. Needs flu shot  Z23 V04.81 INFLUENZA VIRUS VAC QUAD,SPLIT,PRESV FREE SYRINGE IM     Follow-up and Dispositions    · Return in about 2 months (around 12/1/2020) for labs 1 week before. Reviewed plan of care. Patient has provided input and agrees with goals.

## 2020-10-12 RX ORDER — LEVALBUTEROL TARTRATE 45 UG/1
AEROSOL, METERED ORAL
Qty: 45 INHALER | Refills: 0 | Status: SHIPPED | OUTPATIENT
Start: 2020-10-12 | End: 2021-04-15 | Stop reason: SDUPTHER

## 2020-12-22 ENCOUNTER — APPOINTMENT (OUTPATIENT)
Dept: INTERNAL MEDICINE CLINIC | Age: 55
End: 2020-12-22

## 2020-12-22 DIAGNOSIS — E78.00 PURE HYPERCHOLESTEROLEMIA: ICD-10-CM

## 2020-12-22 DIAGNOSIS — E55.9 VITAMIN D DEFICIENCY: ICD-10-CM

## 2020-12-22 DIAGNOSIS — R73.03 PREDIABETES: ICD-10-CM

## 2020-12-23 LAB
25(OH)D3 SERPL-MCNC: 35 NG/ML (ref 30–100)
ALB/GLOBRATIO, 58C: 1.6 (CALC) (ref 1–2.5)
ALBUMIN SERPL-MCNC: 4.5 G/DL (ref 3.6–5.1)
ALKALINE PHOSPHATASE, TOTAL, 25002000: 88 U/L (ref 37–153)
ALT SERPL-CCNC: 45 U/L (ref 6–29)
AST SERPL W P-5'-P-CCNC: 38 U/L (ref 10–35)
BILIRUB SERPL-MCNC: 0.4 MG/DL (ref 0.2–1.2)
BUN SERPL-MCNC: 16 MG/DL (ref 7–25)
BUN/CREATININE RATIO,BUCR: ABNORMAL (CALC) (ref 6–22)
CALCIUM SERPL-MCNC: 10.2 MG/DL (ref 8.6–10.4)
CHLORIDE SERPL-SCNC: 99 MMOL/L (ref 98–110)
CHOL/HDL RATIO,CHHDX: 3.6 (CALC)
CHOLEST SERPL-MCNC: 169 MG/DL
CO2 SERPL-SCNC: 28 MMOL/L (ref 20–32)
CREAT SERPL-MCNC: 1.05 MG/DL (ref 0.5–1.05)
GLOBULIN,GLOB: 2.8 G/DL (CALC) (ref 1.9–3.7)
GLUCOSE SERPL-MCNC: 94 MG/DL (ref 65–99)
HBA1C MFR BLD HPLC: 5.8 % OF TOTAL HGB
HDLC SERPL-MCNC: 47 MG/DL
LDL-CHOLESTEROL: 98 MG/DL (CALC)
NON-HDL CHOLESTEROL, 011976: 122 MG/DL (CALC)
POTASSIUM SERPL-SCNC: 3.9 MMOL/L (ref 3.5–5.3)
PROT SERPL-MCNC: 7.3 G/DL (ref 6.1–8.1)
SODIUM SERPL-SCNC: 136 MMOL/L (ref 135–146)
TRIGL SERPL-MCNC: 139 MG/DL (ref ?–150)

## 2021-01-04 ENCOUNTER — OFFICE VISIT (OUTPATIENT)
Dept: INTERNAL MEDICINE CLINIC | Age: 56
End: 2021-01-04
Payer: COMMERCIAL

## 2021-01-04 VITALS
SYSTOLIC BLOOD PRESSURE: 137 MMHG | WEIGHT: 200 LBS | OXYGEN SATURATION: 98 % | HEIGHT: 65 IN | BODY MASS INDEX: 33.32 KG/M2 | HEART RATE: 79 BPM | TEMPERATURE: 97.4 F | DIASTOLIC BLOOD PRESSURE: 89 MMHG | RESPIRATION RATE: 16 BRPM

## 2021-01-04 DIAGNOSIS — I10 ESSENTIAL HYPERTENSION: Primary | ICD-10-CM

## 2021-01-04 DIAGNOSIS — E55.9 VITAMIN D DEFICIENCY: ICD-10-CM

## 2021-01-04 DIAGNOSIS — K21.00 GASTROESOPHAGEAL REFLUX DISEASE WITH ESOPHAGITIS WITHOUT HEMORRHAGE: ICD-10-CM

## 2021-01-04 DIAGNOSIS — R73.03 PREDIABETES: ICD-10-CM

## 2021-01-04 DIAGNOSIS — E78.00 PURE HYPERCHOLESTEROLEMIA: ICD-10-CM

## 2021-01-04 PROCEDURE — 99214 OFFICE O/P EST MOD 30 MIN: CPT | Performed by: INTERNAL MEDICINE

## 2021-01-04 NOTE — PATIENT INSTRUCTIONS
High Blood Pressure: Care Instructions  Overview     It's normal for blood pressure to go up and down throughout the day. But if it stays up, you have high blood pressure. Another name for high blood pressure is hypertension. Despite what a lot of people think, high blood pressure usually doesn't cause headaches or make you feel dizzy or lightheaded. It usually has no symptoms. But it does increase your risk of stroke, heart attack, and other problems. You and your doctor will talk about your risks of these problems based on your blood pressure. Your doctor will give you a goal for your blood pressure. Your goal will be based on your health and your age. Lifestyle changes, such as eating healthy and being active, are always important to help lower blood pressure. You might also take medicine to reach your blood pressure goal.  Follow-up care is a key part of your treatment and safety. Be sure to make and go to all appointments, and call your doctor if you are having problems. It's also a good idea to know your test results and keep a list of the medicines you take. How can you care for yourself at home? Medical treatment  · If you stop taking your medicine, your blood pressure will go back up. You may take one or more types of medicine to lower your blood pressure. Be safe with medicines. Take your medicine exactly as prescribed. Call your doctor if you think you are having a problem with your medicine. · Talk to your doctor before you start taking aspirin every day. Aspirin can help certain people lower their risk of a heart attack or stroke. But taking aspirin isn't right for everyone, because it can cause serious bleeding. · See your doctor regularly. You may need to see the doctor more often at first or until your blood pressure comes down. · If you are taking blood pressure medicine, talk to your doctor before you take decongestants or anti-inflammatory medicine, such as ibuprofen.  Some of these medicines can raise blood pressure. · Learn how to check your blood pressure at home. Lifestyle changes  · Stay at a healthy weight. This is especially important if you put on weight around the waist. Losing even 10 pounds can help you lower your blood pressure. · If your doctor recommends it, get more exercise. Walking is a good choice. Bit by bit, increase the amount you walk every day. Try for at least 30 minutes on most days of the week. You also may want to swim, bike, or do other activities. · Avoid or limit alcohol. Talk to your doctor about whether you can drink any alcohol. · Try to limit how much sodium you eat to less than 2,300 milligrams (mg) a day. Your doctor may ask you to try to eat less than 1,500 mg a day. · Eat plenty of fruits (such as bananas and oranges), vegetables, legumes, whole grains, and low-fat dairy products. · Lower the amount of saturated fat in your diet. Saturated fat is found in animal products such as milk, cheese, and meat. Limiting these foods may help you lose weight and also lower your risk for heart disease. · Do not smoke. Smoking increases your risk for heart attack and stroke. If you need help quitting, talk to your doctor about stop-smoking programs and medicines. These can increase your chances of quitting for good. When should you call for help? Call  911 anytime you think you may need emergency care. This may mean having symptoms that suggest that your blood pressure is causing a serious heart or blood vessel problem. Your blood pressure may be over 180/120. For example, call 911 if:    · You have symptoms of a heart attack. These may include:  ? Chest pain or pressure, or a strange feeling in the chest.  ? Sweating. ? Shortness of breath. ? Nausea or vomiting. ? Pain, pressure, or a strange feeling in the back, neck, jaw, or upper belly or in one or both shoulders or arms. ? Lightheadedness or sudden weakness.   ? A fast or irregular heartbeat.     · You have symptoms of a stroke. These may include:  ? Sudden numbness, tingling, weakness, or loss of movement in your face, arm, or leg, especially on only one side of your body. ? Sudden vision changes. ? Sudden trouble speaking. ? Sudden confusion or trouble understanding simple statements. ? Sudden problems with walking or balance. ? A sudden, severe headache that is different from past headaches.     · You have severe back or belly pain. Do not wait until your blood pressure comes down on its own. Get help right away. Call your doctor now or seek immediate care if:    · Your blood pressure is much higher than normal (such as 180/120 or higher), but you don't have symptoms.     · You think high blood pressure is causing symptoms, such as:  ? Severe headache.  ? Blurry vision. Watch closely for changes in your health, and be sure to contact your doctor if:    · Your blood pressure measures higher than your doctor recommends at least 2 times. That means the top number is higher or the bottom number is higher, or both.     · You think you may be having side effects from your blood pressure medicine. Where can you learn more? Go to http://www.gray.com/  Enter [id number] in the search box to learn more about \"High Blood Pressure: Care Instructions. \"  Current as of: December 16, 2019               Content Version: 12.6  © 4199-2341 [employer]. Care instructions adapted under license by [employer] (which disclaims liability or warranty for this information). If you have questions about a medical condition or this instruction, always ask your healthcare professional. [address] any warranty or liability for your use of this information.

## 2021-01-04 NOTE — PROGRESS NOTES
Subjective:       Chief Complaint  The patient presents for follow up of hypertension and high cholesterol. Vit D deficiency, prediabetes         HPI  Lindsey Michael is a 63 y.o. female seen for follow up of hyperlipidemia. Shealso has hypertension. Hypertension fairly well controlled, no significant medication side effects noted, on catapres which is also for hot flashes and Zestoretic 20/12.5 BID , patient is not completely happy with her blood pressure and would like it closer to a systolic blood pressure of 120 which I agree with, she will make efforts to lose about 10 pounds which will hopefully make the difference without us having to add more blood pressure medicines , hyperlipidemia well controlled on Zocor 20 mg .   no significant medication side effects noted, on Zocor 20 mg. Pt continues to struggle with with losing weight. She just started a weight loss program. Gave her information on intermittent fasting and ketogenic diet so she can see which one works better for her. She has mild prediabeties which she will try to control with diet and weight loss. She was on Metformin at one time and stopped it because of the concerns about cancer which were explained to her today. We will keep it in mind and even consider Trulicity in the future if she continues to struggle with weight. .     Diet and Lifestyle: generally follows a low fat low cholesterol diet, exercises sporadically    Home BP Monitoring: is not measured at home. Other symptoms and concerns: vit D level is well controlled on  prenatal vitamin and vit D 2000 units/day.     Patient has gastroesophageal reflux disease that is controlled on Prilosec 40 mg daily      Current Outpatient Medications   Medication Sig    levalbuterol tartrate (XOPENEX) 45 mcg/actuation inhaler INHALE 1 TO 2 PUFFS BY MOUTH EVERY 4 TO 6 HOURS AS NEEDED    lisinopril-hydroCHLOROthiazide (PRINZIDE, ZESTORETIC) 20-12.5 mg per tablet TAKE 1 TABLET BY MOUTH BID    cloNIDine (CATAPRES) 0.1 mg/24 hr ptwk APPLY 1 PATCH TO SKIN AND CHANGE EVERY 7 DAYS    simvastatin (ZOCOR) 20 mg tablet TAKE 1 TABLET BY MOUTH EVERYDAY AT BEDTIME    sodium chloride (Saline Nasal Mist) 0.65 % nasal squeeze bottle 0.05 mL by Both Nostrils route as needed for Congestion.  fluticasone propionate (FLONASE) 50 mcg/actuation nasal spray 2 Sprays by Both Nostrils route daily.  omeprazole (PRILOSEC) 40 mg capsule Take 40 mg by mouth daily.  cyclobenzaprine (FLEXERIL) 5 mg tablet TAKE 1-2 TABLETS BY MOUTH AT NIGHT.  YUVAFEM 10 mcg tab vaginal tablet INSERT 1 APPLICATORFUL VAGINALLY AT BEDTIME FOR 14 DAYS THEN TWICE PER WEEK    LACTOBACILLUS ACIDOPHILUS (PROBIOTIC PO) Take  by mouth.  valACYclovir (VALTREX) 500 mg tablet Take 500 mg by mouth daily.  vitamin e 400 unit tab Take 1 Cap by mouth daily.  Cholecalciferol, Vitamin D3, (VITAMIN D) 2,000 unit cap capsule Take 2,000 Units by mouth daily.  cpap machine kit by Does Not Apply route. Replace mask, headgear, heated hose, filters and accessories as needed for one year. CPAP pressure: 9 cwp  Mask: standard Mirage FX plus Opus nasal pillows mask, or mask of choice  [employer]: [employer]  Download data 30 days after initiation of therapy    metFORMIN (GLUCOPHAGE) 500 mg tablet Take 1 Tab by mouth two (2) times daily (with meals). No current facility-administered medications for this visit.               Review of Systems  Respiratory: negative for dyspnea on exertion  Cardiovascular: negative for chest pain    Objective:     Visit Vitals  /89 (BP 1 Location: Left arm, BP Patient Position: Sitting)   Pulse 79   Temp 97.4 °F (36.3 °C) (Temporal)   Resp 16   Ht 5' 5\" (1.651 m)   Wt 200 lb (90.7 kg)   SpO2 98%   BMI 33.28 kg/m²        General appearance - alert, well appearing, and in no distress  Chest - clear to auscultation, no wheezes, rales or rhonchi, symmetric air entry  Heart - normal rate, regular rhythm, normal S1, S2, no murmurs, rubs, clicks or gallops  Extremities - peripheral pulses normal, no pedal edema, no clubbing or cyanosis  Skin - normal coloration and turgor, no rashes, no suspicious skin lesions noted      Labs:   Lab Results   Component Value Date/Time    Cholesterol, total 169 12/22/2020 10:26 AM    HDL Cholesterol 47 (L) 12/22/2020 10:26 AM    LDL-CHOLESTEROL 98 12/22/2020 10:26 AM    LDL, calculated 101 (H) 08/12/2015 09:00 AM    LDL-C, External 96 04/23/2015    Triglyceride 139 12/22/2020 10:26 AM    Cholesterol/HDL ratio 3.6 12/22/2020 10:26 AM     Lab Results   Component Value Date/Time    Sodium 136 12/22/2020 10:26 AM    Potassium 3.9 12/22/2020 10:26 AM    Chloride 99 12/22/2020 10:26 AM    CO2 28 12/22/2020 10:26 AM    Anion gap 5 10/18/2016 05:28 PM    Glucose 94 12/22/2020 10:26 AM    BUN 16 12/22/2020 10:26 AM    Creatinine 1.05 12/22/2020 10:26 AM    BUN/Creatinine ratio NOT APPLICABLE 00/60/1480 64:29 AM    GFR est AA 69 12/22/2020 10:26 AM    GFR est non-AA 59 (L) 12/22/2020 10:26 AM    Calcium 10.2 12/22/2020 10:26 AM    Bilirubin, total 0.4 12/22/2020 10:26 AM    ALT (SGPT) 45 (H) 12/22/2020 10:26 AM    Alk. phosphatase 75 01/29/2020 09:48 AM    Protein, total 7.3 12/22/2020 10:26 AM    Albumin 4.5 12/22/2020 10:26 AM    Globulin 2.8 12/22/2020 10:26 AM    A-G Ratio 1.1 10/18/2016 05:28 PM        Labs:   Lab Results   Component Value Date/Time    WBC 3.3 (L) 07/18/2019 08:56 AM    HGB 13.5 07/18/2019 08:56 AM    HCT 40.8 07/18/2019 08:56 AM    PLATELET 070 64/28/3537 08:56 AM    MCV 93.2 07/18/2019 08:56 AM     Lab Results   Component Value Date/Time    Hemoglobin A1c 5.8 (H) 12/22/2020 10:26 AM    Hemoglobin A1c, External 5.8 11/11/2016            Assessment / Plan     Hypertension fairly well controlled, on current meds. Will try and improve further with weight loss before considering adding more medications  Hyperlipidemia fairly well controlled, on Zocor.  Will try to improve it further with diet and weight loss. ICD-10-CM ICD-9-CM    1. Essential hypertension  V44 180.3 METABOLIC PANEL, COMPREHENSIVE   2. Pure hypercholesterolemia  E78.00 272.0 LIPID PANEL   3. Vitamin D deficiency  E55.9 268.9  well-controlled on vitamin D 2000 units/day VITAMIN D, 25 HYDROXY   4. Prediabetes  R73.03 790.29  patient will try and improve with weight loss of we will need to consider Metformin or Trulicity in the future HEMOGLOBIN A1C W/O EAG   5. 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 Follow-up and Dispositions    · Return in about 3 months (around 4/4/2021) for labs 1 week before. Reviewed plan of care. Patient has provided input and agrees with goals.

## 2021-01-04 NOTE — PROGRESS NOTES
Patient is in the office today for a 2 month follow up. 1. Have you been to the ER, urgent care clinic since your last visit? Hospitalized since your last visit? No    2. Have you seen or consulted any other health care providers outside of the [address] [place] [address] since your last visit? Include any pap smears or colon screening. yes, Dr. Allegra Hartman.

## 2021-03-31 ENCOUNTER — APPOINTMENT (OUTPATIENT)
Dept: INTERNAL MEDICINE CLINIC | Age: 56
End: 2021-03-31

## 2021-03-31 DIAGNOSIS — E55.9 VITAMIN D DEFICIENCY: ICD-10-CM

## 2021-03-31 DIAGNOSIS — R73.03 PREDIABETES: ICD-10-CM

## 2021-03-31 DIAGNOSIS — E78.00 PURE HYPERCHOLESTEROLEMIA: ICD-10-CM

## 2021-03-31 DIAGNOSIS — I10 ESSENTIAL HYPERTENSION: ICD-10-CM

## 2021-04-01 LAB
25(OH)D3 SERPL-MCNC: 35 NG/ML (ref 30–100)
ALB/GLOBRATIO, 58C: 1.5 (CALC) (ref 1–2.5)
ALBUMIN SERPL-MCNC: 4.5 G/DL (ref 3.6–5.1)
ALKALINE PHOSPHATASE, TOTAL, 25002000: 80 U/L (ref 37–153)
ALT SERPL-CCNC: 25 U/L (ref 6–29)
AST SERPL W P-5'-P-CCNC: 27 U/L (ref 10–35)
BILIRUB SERPL-MCNC: 0.4 MG/DL (ref 0.2–1.2)
BUN SERPL-MCNC: 11 MG/DL (ref 7–25)
BUN/CREATININE RATIO,BUCR: NORMAL (CALC) (ref 6–22)
CALCIUM SERPL-MCNC: 10.2 MG/DL (ref 8.6–10.4)
CHLORIDE SERPL-SCNC: 102 MMOL/L (ref 98–110)
CHOL/HDL RATIO,CHHDX: 3.5 (CALC)
CHOLEST SERPL-MCNC: 195 MG/DL
CO2 SERPL-SCNC: 28 MMOL/L (ref 20–32)
CREAT SERPL-MCNC: 1.01 MG/DL (ref 0.5–1.05)
GLOBULIN,GLOB: 3.1 G/DL (CALC) (ref 1.9–3.7)
GLUCOSE SERPL-MCNC: 87 MG/DL (ref 65–99)
HBA1C MFR BLD HPLC: 5.7 % OF TOTAL HGB
HDLC SERPL-MCNC: 55 MG/DL
LDL-CHOLESTEROL: 120 MG/DL (CALC)
NON-HDL CHOLESTEROL, 011976: 140 MG/DL (CALC)
POTASSIUM SERPL-SCNC: 4.3 MMOL/L (ref 3.5–5.3)
PROT SERPL-MCNC: 7.6 G/DL (ref 6.1–8.1)
SODIUM SERPL-SCNC: 140 MMOL/L (ref 135–146)
TRIGL SERPL-MCNC: 101 MG/DL (ref ?–150)

## 2021-04-15 ENCOUNTER — OFFICE VISIT (OUTPATIENT)
Dept: INTERNAL MEDICINE CLINIC | Age: 56
End: 2021-04-15
Payer: COMMERCIAL

## 2021-04-15 VITALS
SYSTOLIC BLOOD PRESSURE: 127 MMHG | DIASTOLIC BLOOD PRESSURE: 81 MMHG | WEIGHT: 192 LBS | BODY MASS INDEX: 31.99 KG/M2 | OXYGEN SATURATION: 99 % | HEIGHT: 65 IN | RESPIRATION RATE: 18 BRPM | HEART RATE: 71 BPM | TEMPERATURE: 97.5 F

## 2021-04-15 DIAGNOSIS — J30.1 SEASONAL ALLERGIC RHINITIS DUE TO POLLEN: ICD-10-CM

## 2021-04-15 DIAGNOSIS — E78.00 PURE HYPERCHOLESTEROLEMIA: ICD-10-CM

## 2021-04-15 DIAGNOSIS — E55.9 VITAMIN D DEFICIENCY: ICD-10-CM

## 2021-04-15 DIAGNOSIS — E66.09 CLASS 1 OBESITY DUE TO EXCESS CALORIES WITH SERIOUS COMORBIDITY AND BODY MASS INDEX (BMI) OF 31.0 TO 31.9 IN ADULT: ICD-10-CM

## 2021-04-15 DIAGNOSIS — R73.03 PREDIABETES: ICD-10-CM

## 2021-04-15 DIAGNOSIS — I10 ESSENTIAL HYPERTENSION: Primary | ICD-10-CM

## 2021-04-15 PROCEDURE — 99214 OFFICE O/P EST MOD 30 MIN: CPT | Performed by: INTERNAL MEDICINE

## 2021-04-15 RX ORDER — LEVALBUTEROL TARTRATE 45 UG/1
AEROSOL, METERED ORAL
Qty: 1 INHALER | Refills: 5 | Status: SHIPPED | OUTPATIENT
Start: 2021-04-15

## 2021-04-15 RX ORDER — FLUTICASONE PROPIONATE 50 MCG
2 SPRAY, SUSPENSION (ML) NASAL DAILY
Qty: 1 BOTTLE | Refills: 5 | Status: SHIPPED | OUTPATIENT
Start: 2021-04-15 | End: 2021-08-04 | Stop reason: SDUPTHER

## 2021-04-15 NOTE — PROGRESS NOTES
Patient is in the office today for a 3 month follow up. 1. Have you been to the ER, urgent care clinic since your last visit? Hospitalized since your last visit? No    2. Have you seen or consulted any other health care providers outside of the [address] [place] [address] since your last visit? Include any pap smears or colon screening. yes, Dr. Cecilia Everett, Neuro and Dr. Orlin Devries.

## 2021-04-15 NOTE — PATIENT INSTRUCTIONS
High Blood Pressure: Care Instructions  Overview     It's normal for blood pressure to go up and down throughout the day. But if it stays up, you have high blood pressure. Another name for high blood pressure is hypertension. Despite what a lot of people think, high blood pressure usually doesn't cause headaches or make you feel dizzy or lightheaded. It usually has no symptoms. But it does increase your risk of stroke, heart attack, and other problems. You and your doctor will talk about your risks of these problems based on your blood pressure. Your doctor will give you a goal for your blood pressure. Your goal will be based on your health and your age. Lifestyle changes, such as eating healthy and being active, are always important to help lower blood pressure. You might also take medicine to reach your blood pressure goal.  Follow-up care is a key part of your treatment and safety. Be sure to make and go to all appointments, and call your doctor if you are having problems. It's also a good idea to know your test results and keep a list of the medicines you take. How can you care for yourself at home? Medical treatment  · If you stop taking your medicine, your blood pressure will go back up. You may take one or more types of medicine to lower your blood pressure. Be safe with medicines. Take your medicine exactly as prescribed. Call your doctor if you think you are having a problem with your medicine. · Talk to your doctor before you start taking aspirin every day. Aspirin can help certain people lower their risk of a heart attack or stroke. But taking aspirin isn't right for everyone, because it can cause serious bleeding. · See your doctor regularly. You may need to see the doctor more often at first or until your blood pressure comes down. · If you are taking blood pressure medicine, talk to your doctor before you take decongestants or anti-inflammatory medicine, such as ibuprofen.  Some of these medicines can raise blood pressure. · Learn how to check your blood pressure at home. Lifestyle changes  · Stay at a healthy weight. This is especially important if you put on weight around the waist. Losing even 10 pounds can help you lower your blood pressure. · If your doctor recommends it, get more exercise. Walking is a good choice. Bit by bit, increase the amount you walk every day. Try for at least 30 minutes on most days of the week. You also may want to swim, bike, or do other activities. · Avoid or limit alcohol. Talk to your doctor about whether you can drink any alcohol. · Try to limit how much sodium you eat to less than 2,300 milligrams (mg) a day. Your doctor may ask you to try to eat less than 1,500 mg a day. · Eat plenty of fruits (such as bananas and oranges), vegetables, legumes, whole grains, and low-fat dairy products. · Lower the amount of saturated fat in your diet. Saturated fat is found in animal products such as milk, cheese, and meat. Limiting these foods may help you lose weight and also lower your risk for heart disease. · Do not smoke. Smoking increases your risk for heart attack and stroke. If you need help quitting, talk to your doctor about stop-smoking programs and medicines. These can increase your chances of quitting for good. When should you call for help? Call  911 anytime you think you may need emergency care. This may mean having symptoms that suggest that your blood pressure is causing a serious heart or blood vessel problem. Your blood pressure may be over 180/120. For example, call 911 if:    · You have symptoms of a heart attack. These may include:  ? Chest pain or pressure, or a strange feeling in the chest.  ? Sweating. ? Shortness of breath. ? Nausea or vomiting. ? Pain, pressure, or a strange feeling in the back, neck, jaw, or upper belly or in one or both shoulders or arms. ? Lightheadedness or sudden weakness.   ? A fast or irregular heartbeat.     · You have symptoms of a stroke. These may include:  ? Sudden numbness, tingling, weakness, or loss of movement in your face, arm, or leg, especially on only one side of your body. ? Sudden vision changes. ? Sudden trouble speaking. ? Sudden confusion or trouble understanding simple statements. ? Sudden problems with walking or balance. ? A sudden, severe headache that is different from past headaches.     · You have severe back or belly pain. Do not wait until your blood pressure comes down on its own. Get help right away. Call your doctor now or seek immediate care if:    · Your blood pressure is much higher than normal (such as 180/120 or higher), but you don't have symptoms.     · You think high blood pressure is causing symptoms, such as:  ? Severe headache.  ? Blurry vision. Watch closely for changes in your health, and be sure to contact your doctor if:    · Your blood pressure measures higher than your doctor recommends at least 2 times. That means the top number is higher or the bottom number is higher, or both.     · You think you may be having side effects from your blood pressure medicine. Where can you learn more? Go to http://www.gray.com/  Enter [id number] in the search box to learn more about \"High Blood Pressure: Care Instructions. \"  Current as of: August 31, 2020               Content Version: 12.8  © 2006-2021 [employer]. Care instructions adapted under license by [employer] (which disclaims liability or warranty for this information). If you have questions about a medical condition or this instruction, always ask your healthcare professional. [place] [address] any warranty or liability for your use of this information.

## 2021-04-18 NOTE — PROGRESS NOTES
Subjective:       Chief Complaint  The patient presents for follow up of hypertension and high cholesterol. Vit D deficiency, prediabetes         HPI  Early Kussmaul is a 63 y.o. female seen for follow up of hyperlipidemia. Sheraymundoo has hypertension. Hypertension  well controlled, no significant medication side effects noted, on catapres which is also for hot flashes and Zestoretic 20/12.5 BID,  hyperlipidemia well controlled on Zocor 20 mg .   no significant medication side effects noted, on Zocor 20 mg. Pt continues to struggle with with losing weight. She will continue to try to AIM for fasting 16 hours and eating more protein and  fiber. She has mild prediabeties which she will try to control with diet and weight loss. She was on Metformin at one time. .     Diet and Lifestyle: generally follows a low fat low cholesterol diet, exercises regularly    Home BP Monitoring: is not measured at home. Other symptoms and concerns: vit D level is well controlled on  prenatal vitamin and vit D 2000 units/day. Patient has gastroesophageal reflux disease that is controlled on Prilosec 40 mg daily      Current Outpatient Medications   Medication Sig    levalbuterol tartrate (XOPENEX) 45 mcg/actuation inhaler INHALE 1 TO 2 PUFFS BY MOUTH EVERY 4 TO 6 HOURS AS NEEDED    fluticasone propionate (FLONASE) 50 mcg/actuation nasal spray 2 Sprays by Both Nostrils route daily.  lisinopril-hydroCHLOROthiazide (PRINZIDE, ZESTORETIC) 20-12.5 mg per tablet TAKE 1 TABLET BY MOUTH BID    cloNIDine (CATAPRES) 0.1 mg/24 hr ptwk APPLY 1 PATCH TO SKIN AND CHANGE EVERY 7 DAYS    simvastatin (ZOCOR) 20 mg tablet TAKE 1 TABLET BY MOUTH EVERYDAY AT BEDTIME    sodium chloride (Saline Nasal Mist) 0.65 % nasal squeeze bottle 0.05 mL by Both Nostrils route as needed for Congestion.  omeprazole (PRILOSEC) 40 mg capsule Take 40 mg by mouth daily.  cyclobenzaprine (FLEXERIL) 5 mg tablet TAKE 1-2 TABLETS BY MOUTH AT NIGHT.     YUVAFEM 10 mcg tab vaginal tablet INSERT 1 APPLICATORFUL VAGINALLY AT BEDTIME FOR 14 DAYS THEN TWICE PER WEEK    LACTOBACILLUS ACIDOPHILUS (PROBIOTIC PO) Take  by mouth.  valACYclovir (VALTREX) 500 mg tablet Take 500 mg by mouth daily.  vitamin e 400 unit tab Take 1 Cap by mouth daily.  Cholecalciferol, Vitamin D3, (VITAMIN D) 2,000 unit cap capsule Take 2,000 Units by mouth daily.  metFORMIN (GLUCOPHAGE) 500 mg tablet Take 1 Tab by mouth two (2) times daily (with meals).  cpap machine kit by Does Not Apply route. Replace mask, headgear, heated hose, filters and accessories as needed for one year. CPAP pressure: 9 cwp  Mask: standard Mirage FX plus Opus nasal pillows mask, or mask of choice  [employer]: [employer]  Download data 30 days after initiation of therapy     No current facility-administered medications for this visit.               Review of Systems  Respiratory: negative for dyspnea on exertion  Cardiovascular: negative for chest pain    Objective:     Visit Vitals  /81 (BP 1 Location: Right arm, BP Patient Position: Sitting, BP Cuff Size: Adult)   Pulse 71   Temp 97.5 °F (36.4 °C) (Temporal)   Resp 18   Ht 5' 5\" (1.651 m)   Wt 192 lb (87.1 kg)   SpO2 99%   BMI 31.95 kg/m²        General appearance - alert, well appearing, and in no distress  Chest - clear to auscultation, no wheezes, rales or rhonchi, symmetric air entry  Heart - normal rate, regular rhythm, normal S1, S2, no murmurs, rubs, clicks or gallops  Extremities - peripheral pulses normal, no pedal edema, no clubbing or cyanosis  Skin - normal coloration and turgor, no rashes, no suspicious skin lesions noted      Labs:   Lab Results   Component Value Date/Time    Cholesterol, total 195 03/31/2021 12:47 PM    HDL Cholesterol 55 03/31/2021 12:47 PM    LDL-CHOLESTEROL 120 (H) 03/31/2021 12:47 PM    LDL, calculated 101 (H) 08/12/2015 09:00 AM    LDL-C, External 96 04/23/2015    Triglyceride 101 03/31/2021 12:47 PM Cholesterol/HDL ratio 3.5 03/31/2021 12:47 PM     Lab Results   Component Value Date/Time    Sodium 140 03/31/2021 12:47 PM    Potassium 4.3 03/31/2021 12:47 PM    Chloride 102 03/31/2021 12:47 PM    CO2 28 03/31/2021 12:47 PM    Anion gap 5 10/18/2016 05:28 PM    Glucose 87 03/31/2021 12:47 PM    BUN 11 03/31/2021 12:47 PM    Creatinine 1.01 03/31/2021 12:47 PM    BUN/Creatinine ratio NOT APPLICABLE 24/50/2045 83:38 PM    GFR est AA 72 03/31/2021 12:47 PM    GFR est non-AA 62 03/31/2021 12:47 PM    Calcium 10.2 03/31/2021 12:47 PM    Bilirubin, total 0.4 03/31/2021 12:47 PM    ALT (SGPT) 25 03/31/2021 12:47 PM    Alk. phosphatase 75 01/29/2020 09:48 AM    Protein, total 7.6 03/31/2021 12:47 PM    Albumin 4.5 03/31/2021 12:47 PM    Globulin 3.1 03/31/2021 12:47 PM    A-G Ratio 1.1 10/18/2016 05:28 PM        Labs:   Lab Results   Component Value Date/Time    WBC 3.3 (L) 07/18/2019 08:56 AM    HGB 13.5 07/18/2019 08:56 AM    HCT 40.8 07/18/2019 08:56 AM    PLATELET 310 55/49/5788 08:56 AM    MCV 93.2 07/18/2019 08:56 AM     Lab Results   Component Value Date/Time    Hemoglobin A1c 5.7 (H) 03/31/2021 12:47 PM    Hemoglobin A1c, External 5.8 11/11/2016            Assessment / Plan     Hypertension fwell controlled, on current meds. Will try and improve further with weight loss before considering cutting back medications  Hyperlipidemia borderline controlled, on Zocor 20 mg. Will try to improve it further with diet and weight loss. ICD-10-CM ICD-9-CM    1. Essential hypertension  A52 636.5 METABOLIC PANEL, COMPREHENSIVE   2. Pure hypercholesterolemia  E78.00 272.0 LIPID PANEL   3. Prediabetes  R73.03 790.29  patient will try and improve by cutting back starches and sweets in her diet HEMOGLOBIN A1C W/O EAG   4.  Vitamin D deficiency  E55.9 268.9  well-controlled current supplementation VITAMIN D, 25 HYDROXY   5. Seasonal allergic rhinitis due to pollen  J30.1 477.0 fluticasone propionate (FLONASE) 50 mcg/actuation nasal spray   6. Class 1 obesity due to excess calories with serious comorbidity and body mass index (BMI) of 31.0 to 31.9 in adult  E66.09 278.00  patient will continue to work on trying to increase the amount of time she does fasting and cut back snacking with high sugar foods like candy. Z68.31 V85.31        HM: pt had bilateral prophylactic mastectomy and TRAM flap and no longer does screening mammograms           Low cholesterol diet, weight control and daily exercise discussed. Follow-up and Dispositions    · Return in about 3 months (around 7/15/2021) for labs 1 week before. Reviewed plan of care. Patient has provided input and agrees with goals.

## 2021-05-30 RX ORDER — SIMVASTATIN 20 MG/1
TABLET, FILM COATED ORAL
Qty: 90 TABLET | Refills: 2 | Status: SHIPPED | OUTPATIENT
Start: 2021-05-30 | End: 2022-03-21

## 2021-06-22 RX ORDER — CLONIDINE 0.1 MG/24H
PATCH, EXTENDED RELEASE TRANSDERMAL
Qty: 12 PATCH | Refills: 3 | Status: SHIPPED | OUTPATIENT
Start: 2021-06-22 | End: 2022-05-29

## 2021-07-14 ENCOUNTER — APPOINTMENT (OUTPATIENT)
Dept: INTERNAL MEDICINE CLINIC | Age: 56
End: 2021-07-14

## 2021-07-14 DIAGNOSIS — E78.00 PURE HYPERCHOLESTEROLEMIA: ICD-10-CM

## 2021-07-14 DIAGNOSIS — R73.03 PREDIABETES: ICD-10-CM

## 2021-07-14 DIAGNOSIS — E55.9 VITAMIN D DEFICIENCY: ICD-10-CM

## 2021-07-14 DIAGNOSIS — I10 ESSENTIAL HYPERTENSION: ICD-10-CM

## 2021-07-15 LAB
25(OH)D3 SERPL-MCNC: 34 NG/ML (ref 30–100)
ALB/GLOBRATIO, 58C: 1.6 (CALC) (ref 1–2.5)
ALBUMIN SERPL-MCNC: 4.4 G/DL (ref 3.6–5.1)
ALKALINE PHOSPHATASE, TOTAL, 25002000: 79 U/L (ref 37–153)
ALT SERPL-CCNC: 22 U/L (ref 6–29)
AST SERPL W P-5'-P-CCNC: 24 U/L (ref 10–35)
BILIRUB SERPL-MCNC: 0.5 MG/DL (ref 0.2–1.2)
BUN SERPL-MCNC: 12 MG/DL (ref 7–25)
BUN/CREATININE RATIO,BUCR: ABNORMAL (CALC) (ref 6–22)
CALCIUM SERPL-MCNC: 9.8 MG/DL (ref 8.6–10.4)
CHLORIDE SERPL-SCNC: 105 MMOL/L (ref 98–110)
CHOL/HDL RATIO,CHHDX: 3 (CALC)
CHOLEST SERPL-MCNC: 157 MG/DL
CO2 SERPL-SCNC: 26 MMOL/L (ref 20–32)
CREAT SERPL-MCNC: 0.95 MG/DL (ref 0.5–1.05)
GLOBULIN,GLOB: 2.7 G/DL (CALC) (ref 1.9–3.7)
GLUCOSE SERPL-MCNC: 103 MG/DL (ref 65–99)
HBA1C MFR BLD HPLC: 5.5 % OF TOTAL HGB
HDLC SERPL-MCNC: 52 MG/DL
LDL-CHOLESTEROL: 85 MG/DL (CALC)
NON-HDL CHOLESTEROL, 011976: 105 MG/DL (CALC)
POTASSIUM SERPL-SCNC: 4.8 MMOL/L (ref 3.5–5.3)
PROT SERPL-MCNC: 7.1 G/DL (ref 6.1–8.1)
SODIUM SERPL-SCNC: 141 MMOL/L (ref 135–146)
TRIGL SERPL-MCNC: 108 MG/DL (ref ?–150)

## 2021-07-16 NOTE — PERIOP NOTES
PRE-SURGICAL INSTRUCTIONS        Patient's Name:  Hardy Treviño      [id number] Date:  7/16/2021              Surgery Date:  7/27/2021                1. Do NOT eat or drink anything, including candy, gum, or ice chips after midnight on 07/26/21, unless you have specific instructions from your surgeon or anesthesia provider to do so.  2. You may brush your teeth before coming to the hospital.  3. No smoking 24 hours prior to the day of surgery. 4. No alcohol 24 hours prior to the day of surgery. 5. No recreational drugs for one week prior to the day of surgery. 6. Leave all valuables, including money/purse, at home. 7. Remove all jewelry, nail polish, acrylic nails, and makeup (including mascara); no lotions powders, deodorant, or perfume/cologne/after shave on the skin. 8. Glasses/contact lenses and dentures may be worn to the hospital.  They will be removed prior to surgery. 9. Call your doctor if symptoms of a cold or illness develop within 24-48 hours prior to your surgery. 10.  AN ADULT MUST DRIVE YOU HOME AFTER OUTPATIENT SURGERY. 11.  If you are having an outpatient procedure, please make arrangements for a responsible adult to be with you for 24 hours after your surgery. Special Instructions:      Bring list of CURRENT medications. Bring inhaler. Bring any pertinent legal medical records. Take these medications the morning of surgery with a sip of water:  BP  Follow physician instructions about stopping anticoagulants. On the day of [facility] Let the  at the desk know you are there for surgery. A staff member will come escort you to the surgical area on the second floor.     If you have any questions or concerns, please do not hesitate to call:     (Prior to the day of surgery) PAT department:  [phone]   (Day of surgery) Pre-Op department:  [phone]    These surgical instructions were reviewed with PATIENT during the PAT phone call.

## 2021-07-21 ENCOUNTER — OFFICE VISIT (OUTPATIENT)
Dept: INTERNAL MEDICINE CLINIC | Age: 56
End: 2021-07-21
Payer: COMMERCIAL

## 2021-07-21 VITALS
OXYGEN SATURATION: 100 % | HEIGHT: 65 IN | SYSTOLIC BLOOD PRESSURE: 136 MMHG | HEART RATE: 65 BPM | WEIGHT: 193.6 LBS | DIASTOLIC BLOOD PRESSURE: 82 MMHG | RESPIRATION RATE: 12 BRPM | BODY MASS INDEX: 32.26 KG/M2 | TEMPERATURE: 97.8 F

## 2021-07-21 DIAGNOSIS — E55.9 VITAMIN D DEFICIENCY: ICD-10-CM

## 2021-07-21 DIAGNOSIS — E78.00 PURE HYPERCHOLESTEROLEMIA: ICD-10-CM

## 2021-07-21 DIAGNOSIS — R73.03 PREDIABETES: ICD-10-CM

## 2021-07-21 DIAGNOSIS — I10 ESSENTIAL HYPERTENSION: Primary | ICD-10-CM

## 2021-07-21 PROCEDURE — 99214 OFFICE O/P EST MOD 30 MIN: CPT | Performed by: INTERNAL MEDICINE

## 2021-07-21 NOTE — PROGRESS NOTES
Subjective:       Chief Complaint  The patient presents for follow up of hypertension and high cholesterol. Vit D deficiency, prediabetes         HPI  Yumiko Vieira is a 63 y.o. female seen for follow up of hyperlipidemia. Shealso has hypertension. Hypertension  well controlled, no significant medication side effects noted, on catapres which is also for hot flashes and Zestoretic 20/12.5 BID,  hyperlipidemia well controlled on Zocor 20 mg. No significant medication side effects noted, on Zocor 20 mg. Pt continues to work on losing weight. She will continue to try to AIM for fasting 16 hours and eating more protein and  Fiber. She is currently just fasting 12 hours. She has mild prediabeties which actually resolved with last blood work. She was on Metformin at one time. She is doing well with diet and weight loss at this time. .     Diet and Lifestyle: generally follows a low fat low cholesterol diet, exercises regularly    Home BP Monitoring: is not measured at home. Other symptoms and concerns: vit D level is fairly  well controlled on  vit D 5000 units/week    Patient has gastroesophageal reflux disease that is controlled on Prilosec 40 mg daily. Current Outpatient Medications   Medication Sig    cloNIDine (CATAPRES) 0.1 mg/24 hr ptwk APPLY 1 PATCH TO SKIN AND CHANGE EVERY 7 DAYS    simvastatin (ZOCOR) 20 mg tablet TAKE 1 TABLET BY MOUTH EVERYDAY AT BEDTIME    levalbuterol tartrate (XOPENEX) 45 mcg/actuation inhaler INHALE 1 TO 2 PUFFS BY MOUTH EVERY 4 TO 6 HOURS AS NEEDED    fluticasone propionate (FLONASE) 50 mcg/actuation nasal spray 2 Sprays by Both Nostrils route daily.  lisinopril-hydroCHLOROthiazide (PRINZIDE, ZESTORETIC) 20-12.5 mg per tablet TAKE 1 TABLET BY MOUTH BID    sodium chloride (Saline Nasal Mist) 0.65 % nasal squeeze bottle 0.05 mL by Both Nostrils route as needed for Congestion.  omeprazole (PRILOSEC) 40 mg capsule Take 40 mg by mouth daily.     cyclobenzaprine (FLEXERIL) 5 mg tablet TAKE 1-2 TABLETS BY MOUTH AT NIGHT.  YUVAFEM 10 mcg tab vaginal tablet INSERT 1 APPLICATORFUL VAGINALLY AT BEDTIME FOR 14 DAYS THEN TWICE PER WEEK    LACTOBACILLUS ACIDOPHILUS (PROBIOTIC PO) Take  by mouth.  valACYclovir (VALTREX) 500 mg tablet Take 500 mg by mouth daily.  vitamin e 400 unit tab Take 1 Cap by mouth daily.  Cholecalciferol, Vitamin D3, (VITAMIN D) 2,000 unit cap capsule Take 2,000 Units by mouth daily.  cpap machine kit by Does Not Apply route. Replace mask, headgear, heated hose, filters and accessories as needed for one year. CPAP pressure: 9 cwp  Mask: standard Mirage FX plus Opus nasal pillows mask, or mask of choice  [facility]: [employer]  Download data 30 days after initiation of therapy     No current facility-administered medications for this visit.              Review of Systems  Respiratory: negative for dyspnea on exertion  Cardiovascular: negative for chest pain    Objective:     Visit Vitals  /82   Pulse 65   Temp 97.8 °F (36.6 °C) (Temporal)   Resp 12   Ht 5' 5\" (1.651 m)   Wt 193 lb 9.6 oz (87.8 kg)   SpO2 100%   BMI 32.22 kg/m²        General appearance - alert, well appearing, and in no distress  Chest - clear to auscultation, no wheezes, rales or rhonchi, symmetric air entry  Heart - normal rate, regular rhythm, normal S1, S2, no murmurs, rubs, clicks or gallops  Extremities - peripheral pulses normal, no pedal edema, no clubbing or cyanosis  Skin - normal coloration and turgor, no rashes, no suspicious skin lesions noted      Labs:   Lab Results   Component Value Date/Time    Cholesterol, total 157 07/14/2021 08:51 AM    HDL Cholesterol 52 07/14/2021 08:51 AM    LDL-CHOLESTEROL 85 07/14/2021 08:51 AM    LDL, calculated 101 (H) 08/12/2015 09:00 AM    LDL-C, External 96 04/23/2015 12:00 AM    Triglyceride 108 07/14/2021 08:51 AM    Cholesterol/HDL ratio 3.0 07/14/2021 08:51 AM     Lab Results   Component Value Date/Time    Sodium 141 07/14/2021 08:51 AM    Potassium 4.8 07/14/2021 08:51 AM    Chloride 105 07/14/2021 08:51 AM    CO2 26 07/14/2021 08:51 AM    Anion gap 5 10/18/2016 05:28 PM    Glucose 103 (H) 07/14/2021 08:51 AM    BUN 12 07/14/2021 08:51 AM    Creatinine 0.95 07/14/2021 08:51 AM    BUN/Creatinine ratio NOT APPLICABLE 07/96/5187 98:85 AM    GFR est AA 78 07/14/2021 08:51 AM    GFR est non-AA 67 07/14/2021 08:51 AM    Calcium 9.8 07/14/2021 08:51 AM    Bilirubin, total 0.5 07/14/2021 08:51 AM    ALT (SGPT) 22 07/14/2021 08:51 AM    Alk. phosphatase 75 01/29/2020 09:48 AM    Protein, total 7.1 07/14/2021 08:51 AM    Albumin 4.4 07/14/2021 08:51 AM    Globulin 2.7 07/14/2021 08:51 AM    A-G Ratio 1.1 10/18/2016 05:28 PM        Labs:   Lab Results   Component Value Date/Time    WBC 3.3 (L) 07/18/2019 08:56 AM    HGB 13.5 07/18/2019 08:56 AM    HCT 40.8 07/18/2019 08:56 AM    PLATELET 435 28/74/0510 08:56 AM    MCV 93.2 07/18/2019 08:56 AM     Lab Results   Component Value Date/Time    Hemoglobin A1c 5.5 07/14/2021 08:51 AM    Hemoglobin A1c, External 5.8 11/11/2016 12:00 AM            Assessment / Plan     Hypertension well controlled, on current meds. Will try and improve further with weight loss before considering cutting back medications  Hyperlipidemia fairly well controlled, on Zocor 20 mg. Will try to improve it further with diet and weight loss. ICD-10-CM ICD-9-CM    1. Essential hypertension  K12 793.4 METABOLIC PANEL, COMPREHENSIVE   2. Pure hypercholesterolemia  E78.00 272.0 LIPID PANEL   3. Prediabetes  R73.03 790.29 Well controlled with diet HEMOGLOBIN A1C W/O EAG   4. Vitamin D deficiency  E55.9 268.9 Fairly well controlled on current supplementation but would increase to vit D 5000 units 2x/week VITAMIN D, 25 HYDROXY       HM: pt had bilateral prophylactic mastectomy and TRAM flap and no longer does screening mammograms           Low cholesterol diet, weight control and daily exercise discussed.      Follow-up and Dispositions    · Return in about 3 months (around 10/21/2021) for labs 1 week before. Reviewed plan of care. Patient has provided input and agrees with goals.

## 2021-07-26 ENCOUNTER — ANESTHESIA EVENT (OUTPATIENT)
Dept: ENDOSCOPY | Age: 56
End: 2021-07-26
Payer: OTHER GOVERNMENT

## 2021-07-27 ENCOUNTER — HOSPITAL ENCOUNTER (OUTPATIENT)
Age: 56
Setting detail: OUTPATIENT SURGERY
Discharge: HOME | End: 2021-07-27
Attending: INTERNAL MEDICINE | Admitting: INTERNAL MEDICINE
Payer: OTHER GOVERNMENT

## 2021-07-27 ENCOUNTER — ANESTHESIA (OUTPATIENT)
Dept: ENDOSCOPY | Age: 56
End: 2021-07-27
Payer: OTHER GOVERNMENT

## 2021-07-27 VITALS
SYSTOLIC BLOOD PRESSURE: 122 MMHG | HEIGHT: 65 IN | OXYGEN SATURATION: 100 % | BODY MASS INDEX: 32.49 KG/M2 | TEMPERATURE: 98.4 F | RESPIRATION RATE: 16 BRPM | DIASTOLIC BLOOD PRESSURE: 84 MMHG | HEART RATE: 69 BPM | WEIGHT: 195 LBS

## 2021-07-27 PROCEDURE — 74011000250 HC RX REV CODE- 250: Performed by: NURSE ANESTHETIST, CERTIFIED REGISTERED

## 2021-07-27 PROCEDURE — 00731 ANES UPR GI NDSC PX NOS: CPT | Performed by: ANESTHESIOLOGY

## 2021-07-27 PROCEDURE — 74011250636 HC RX REV CODE- 250/636: Performed by: ANESTHESIOLOGY

## 2021-07-27 PROCEDURE — 76040000019: Performed by: INTERNAL MEDICINE

## 2021-07-27 PROCEDURE — 74011250636 HC RX REV CODE- 250/636: Performed by: NURSE ANESTHETIST, CERTIFIED REGISTERED

## 2021-07-27 PROCEDURE — 76060000031 HC ANESTHESIA FIRST 0.5 HR: Performed by: INTERNAL MEDICINE

## 2021-07-27 PROCEDURE — 2709999900 HC NON-CHARGEABLE SUPPLY: Performed by: INTERNAL MEDICINE

## 2021-07-27 PROCEDURE — 74011000250 HC RX REV CODE- 250: Performed by: ANESTHESIOLOGY

## 2021-07-27 RX ORDER — SODIUM CHLORIDE 0.9 % (FLUSH) 0.9 %
5-40 SYRINGE (ML) INJECTION AS NEEDED
Status: CANCELLED | OUTPATIENT
Start: 2021-07-27

## 2021-07-27 RX ORDER — SODIUM CHLORIDE 0.9 % (FLUSH) 0.9 %
5-40 SYRINGE (ML) INJECTION EVERY 8 HOURS
Status: CANCELLED | OUTPATIENT
Start: 2021-07-27

## 2021-07-27 RX ORDER — SODIUM CHLORIDE, SODIUM LACTATE, POTASSIUM CHLORIDE, CALCIUM CHLORIDE 600; 310; 30; 20 MG/100ML; MG/100ML; MG/100ML; MG/100ML
25 INJECTION, SOLUTION INTRAVENOUS CONTINUOUS
Status: DISCONTINUED | OUTPATIENT
Start: 2021-07-27 | End: 2021-07-27 | Stop reason: HOSPADM

## 2021-07-27 RX ORDER — LIDOCAINE HYDROCHLORIDE 10 MG/ML
0.1 INJECTION, SOLUTION EPIDURAL; INFILTRATION; INTRACAUDAL; PERINEURAL AS NEEDED
Status: DISCONTINUED | OUTPATIENT
Start: 2021-07-27 | End: 2021-07-27 | Stop reason: HOSPADM

## 2021-07-27 RX ORDER — DEXTROMETHORPHAN/PSEUDOEPHED 2.5-7.5/.8
1.2 DROPS ORAL
Status: CANCELLED | OUTPATIENT
Start: 2021-07-27

## 2021-07-27 RX ORDER — EPINEPHRINE 0.1 MG/ML
1 INJECTION INTRACARDIAC; INTRAVENOUS
Status: CANCELLED | OUTPATIENT
Start: 2021-07-27 | End: 2021-07-28

## 2021-07-27 RX ORDER — FLUMAZENIL 0.1 MG/ML
0.2 INJECTION INTRAVENOUS
Status: CANCELLED | OUTPATIENT
Start: 2021-07-27 | End: 2021-07-27

## 2021-07-27 RX ORDER — LIDOCAINE HYDROCHLORIDE 20 MG/ML
INJECTION, SOLUTION EPIDURAL; INFILTRATION; INTRACAUDAL; PERINEURAL AS NEEDED
Status: DISCONTINUED | OUTPATIENT
Start: 2021-07-27 | End: 2021-07-27 | Stop reason: HOSPADM

## 2021-07-27 RX ORDER — PROPOFOL 10 MG/ML
INJECTION, EMULSION INTRAVENOUS AS NEEDED
Status: DISCONTINUED | OUTPATIENT
Start: 2021-07-27 | End: 2021-07-27 | Stop reason: HOSPADM

## 2021-07-27 RX ORDER — ATROPINE SULFATE 0.1 MG/ML
0.5 INJECTION INTRAVENOUS
Status: CANCELLED | OUTPATIENT
Start: 2021-07-27 | End: 2021-07-28

## 2021-07-27 RX ORDER — NALOXONE HYDROCHLORIDE 0.4 MG/ML
0.4 INJECTION, SOLUTION INTRAMUSCULAR; INTRAVENOUS; SUBCUTANEOUS
Status: CANCELLED | OUTPATIENT
Start: 2021-07-27 | End: 2021-07-27

## 2021-07-27 RX ADMIN — LIDOCAINE HYDROCHLORIDE 80 MG: 20 INJECTION, SOLUTION EPIDURAL; INFILTRATION; INTRACAUDAL; PERINEURAL at 09:23

## 2021-07-27 RX ADMIN — PROPOFOL 50 MG: 10 INJECTION, EMULSION INTRAVENOUS at 09:26

## 2021-07-27 RX ADMIN — PROPOFOL 40 MG: 10 INJECTION, EMULSION INTRAVENOUS at 09:29

## 2021-07-27 RX ADMIN — SODIUM CHLORIDE, SODIUM LACTATE, POTASSIUM CHLORIDE, AND CALCIUM CHLORIDE 25 ML/HR: 600; 310; 30; 20 INJECTION, SOLUTION INTRAVENOUS at 09:09

## 2021-07-27 RX ADMIN — FAMOTIDINE 20 MG: 10 INJECTION INTRAVENOUS at 09:10

## 2021-07-27 RX ADMIN — PROPOFOL 20 MG: 10 INJECTION, EMULSION INTRAVENOUS at 09:24

## 2021-07-27 NOTE — PROCEDURES
[email or web address]  [facility], [address]      Brief Procedure Note    Inez Quiñonez  1965  [id number]    Date of Procedure: 7/27/2021    Preoperative diagnosis: Gastro-esophageal reflux disease without esophagitis:  530.81 - K21.9    Postoperative diagnosis: small hiatal hernia    Type of Anesthesia: MAC (Monitored anesthesia care)    Description of findings: same as post op dx    Procedure: Procedure(s):  UPPER ENDOSCOPY    :  Dr. Shyann Blood MD    Assistant(s): Endoscopy Technician-1: Olegario Bowden Staff: Ambrose Posada RN    EBL:None    Specimens:  None    Findings: See printed and scanned procedure note    Complications: None    Dr. Shyann Blood MD  7/27/2021  9:39 AM

## 2021-07-27 NOTE — ANESTHESIA PREPROCEDURE EVALUATION
Relevant Problems   RESPIRATORY SYSTEM   (+) SUBHASH on CPAP      CARDIOVASCULAR   (+) HTN (hypertension)   (+) Undiagnosed cardiac murmurs      GASTROINTESTINAL   (+) GERD (gastroesophageal reflux disease)      ENDOCRINE   (+) Obesity, unspecified       Anesthetic History   No history of anesthetic complications            Review of Systems / Medical History  Patient summary reviewed, nursing notes reviewed and pertinent labs reviewed    Pulmonary        Sleep apnea: No treatment           Neuro/Psych   Within defined limits           Cardiovascular    Hypertension: well controlled              Exercise tolerance: >4 METS     GI/Hepatic/Renal     GERD: poorly controlled           Endo/Other        Obesity and morbid obesity     Other Findings              Physical Exam    Airway  Mallampati: III  TM Distance: < 4 cm  Neck ROM: normal range of motion, short neck   Mouth opening: Normal     Cardiovascular  Regular rate and rhythm,  S1 and S2 normal,  no murmur, click, rub, or gallop  Rhythm: regular  Rate: normal         Dental  No notable dental hx       Pulmonary  Breath sounds clear to auscultation               Abdominal  GI exam deferred       Other Findings            Anesthetic Plan    ASA: 2  Anesthesia type: MAC          Induction: Intravenous  Anesthetic plan and risks discussed with: Patient

## 2021-07-27 NOTE — DISCHARGE INSTRUCTIONS
Upper GI Endoscopy: What to Expect at [address] [place] [address]  After you have an endoscopy, you will stay at the hospital or clinic for 1 to 2 hours. This will allow the medicine to wear off. You will be able to go home after your doctor or nurse checks to make sure you are not having any problems. You may have to stay overnight if you had treatment during the test. You may have a sore throat for a day or two after the test.  This care sheet gives you a general idea about what to expect after the test.  How can you care for yourself at home? Activity  · Rest as much as you need to after you go home. · You should be able to go back to your usual activities the day after the test.  Diet  · Follow your doctor's directions for eating after the test.  · Drink plenty of fluids (unless your doctor has told you not to). Medications  · If you have a sore throat the day after the test, use an over-the-counter spray to numb your throat. Follow-up care is a key part of your treatment and safety. Be sure to make and go to all appointments, and call your doctor if you are having problems. It's also a good idea to know your test results and keep a list of the medicines you take. When should you call for help? Call 911 anytime you think you may need emergency care. For example, call if:  · You passed out (lost consciousness). · You cough up blood. · You vomit blood or what looks like coffee grounds. · You pass maroon or very bloody stools. Call your doctor now or seek immediate medical care if:  · You have trouble swallowing. · You have belly pain. · Your stools are black and tarlike or have streaks of blood. · You are sick to your stomach or cannot keep fluids down. Watch closely for changes in your health, and be sure to contact your doctor if:  · Your throat still hurts after a day or two. · You do not get better as expected. Where can you learn more?    Go to DealExplorer.be  Enter J454 in the search box to learn more about \"Upper GI Endoscopy: What to Expect at Home. \"   © 7089-9912 [employer]. Care instructions adapted under license by [facility] (which disclaims liability or warranty for this information). This care instruction is for use with your licensed healthcare professional. If you have questions about a medical condition or this instruction, always ask your healthcare professional. [address] any warranty or liability for your use of this information. Content Version: 91.2.883055; Current as of: November 14, 2014    DISCHARGE SUMMARY from Nurse     POST-PROCEDURE INSTRUCTIONS:    Call your Physician if you:  ? Observe any excess bleeding. ? Develop a temperature over 100.5o F.  ? Experience abdominal, shoulder or chest pain. ? Notice any signs of decreased circulation or nerve impairment to an extremity such as a change in color, persistent numbness, tingling, coldness or increase in pain. ? Vomit blood or you have nausea and vomiting lasting longer than 4 hours. ? Are unable to take medications. ? Are unable to urinate within 8 hours after discharge following general anesthesia or intravenous sedation. For the next 24 hours after receiving general anesthesia or intravenous sedation, or while taking prescription Narcotics, limit your activities:  ? Do NOT drive a motor vehicle, operate hazard machinery or power tools, or perform tasks that require coordination. The medication you received during your procedure may have some effect on your mental awareness. ? Do NOT make important personal or business decisions. The medication you received during your procedure may have some effect on your mental awareness. ? Do NOT drink alcoholic beverages. These drinks do not mix well with the medications that have been given to you. ? Upon discharge from the hospital, you must be accompanied by a responsible adult. ?  Resume your diet as directed by your physician. ? Resume medications as your physician has prescribed. ? Please give a list of your current medications to your Primary Care Provider. ? Please update this list whenever your medications are discontinued, doses are changed, or new medications (including over-the-counter products) are added. ? Please carry medication information at all times in case of emergency situations. These are general instructions for a healthy lifestyle:    No smoking/ No tobacco products/ Avoid exposure to second hand smoke.  Surgeon General's Warning:  Quitting smoking now greatly reduces serious risk to your health. Obesity, smoking, and a sedentary lifestyle greatly increase your risk for illness.  A healthy diet, regular physical exercise & weight monitoring are important for maintaining a healthy lifestyle   You may be retaining fluid if you have a history of heart failure or if you experience any of the following symptoms:  Weight gain of 3 pounds or more overnight or 5 pounds in a week, increased swelling in our hands or feet or shortness of breath while lying flat in bed. Please call your doctor as soon as you notice any of these symptoms; do not wait until your next office visit. Recognize signs and symptoms of STROKE:  F  -  Face looks uneven  A  -  Arms unable to move or move unevenly  S  -  Speech slurred or non-existent  T  -  Time to call 911 - as soon as signs and symptoms begin - DO NOT go back to bed or wait to see If you get better - TIME IS BRAIN. Colorectal Screening   Colorectal cancer almost always develops from precancerous polyps (abnormal growths) in the colon or rectum. Screening tests can find precancerous polyps, so that they can be removed before they turn into cancer.  Screening tests can also find colorectal cancer early, when treatment works best.  Carlos Speak with your physician about when you should begin screening and how often you should be tested  Carlos   Additional Information    If you have questions, please call [phone]. Remember, MyChart is NOT to be used for urgent needs. For medical emergencies, dial 911. Hiatal Hernia: Care Instructions  Your Care Instructions  A hiatal hernia occurs when part of the stomach bulges into the chest cavity. A hiatal hernia may allow stomach acid and juices to back up into the esophagus (acid reflux). This can cause a feeling of burning, warmth, heat, or pain behind the breastbone. This feeling may often occur after you eat, soon after you lie down, or when you bend forward, and it may come and go. You also may have a sour taste in your mouth. These symptoms are commonly known as heartburn or reflux. But not all hiatal hernias cause symptoms. Follow-up care is a key part of your treatment and safety. Be sure to make and go to all appointments, and call your doctor if you are having problems. It's also a good idea to know your test results and keep a list of the medicines you take. How can you care for yourself at home? · Take your medicines exactly as prescribed. Call your doctor if you think you are having a problem with your medicine. · Do not take aspirin or other nonsteroidal anti-inflammatory drugs (NSAIDs), such as ibuprofen (Advil, Motrin) or naproxen (Aleve), unless your doctor says it is okay. Ask your doctor what you can take for pain. · Your doctor may recommend over-the-counter medicine. For mild or occasional indigestion, antacids such as Tums, Gaviscon, Maalox, or Mylanta may help. Your doctor also may recommend over-the-counter acid reducers, such as famotidine (Pepcid AC), cimetidine (Tagamet HB), or omeprazole (Prilosec). Read and follow all instructions on the label. If you use these medicines often, talk with your doctor. · Change your eating habits. ? It's best to eat several small meals instead of two or three large meals. ? After you eat, wait 2 to 3 hours before you lie down.  Late-night snacks aren't a good idea. ? Chocolate, mint, and alcohol can make heartburn worse. They relax the valve between the esophagus and the stomach. ? Spicy foods, foods that have a lot of acid (like tomatoes and oranges), and coffee can make heartburn symptoms worse in some people. If your symptoms are worse after you eat a certain food, you may want to stop eating that food to see if your symptoms get better. · Do not smoke or chew tobacco.  · If you get heartburn at night, raise the head of your bed 6 to 8 inches by putting the frame on blocks or placing a foam wedge under the head of your mattress. (Adding extra pillows does not work.)  · Do not wear tight clothing around your middle. · Lose weight if you need to. Losing just 5 to 10 pounds can help. When should you call for help? Call your doctor now or seek immediate medical care if:    · You have new or worse belly pain.     · You are vomiting. Watch closely for changes in your health, and be sure to contact your doctor if:    · You have new or worse symptoms of indigestion.     · You have trouble or pain swallowing.     · You are losing weight.     · You do not get better as expected. Where can you learn more? Go to http://www.IntroNet.com/  Enter T074 in the search box to learn more about \"Hiatal Hernia: Care Instructions. \"  Current as of: April 15, 2020               Content Version: 12.8  © 5733-6734 [employer]. Care instructions adapted under license by [employer] (which disclaims liability or warranty for this information). If you have questions about a medical condition or this instruction, always ask your healthcare professional. [place] [address] any warranty or liability for your use of this information. APPOINTMENTS:    Please make a follow-up appointment with your physician. Discharge information has been reviewed with the patient.   The patient verbalized understanding.

## 2021-07-27 NOTE — H&P
[email or web address]  [phone]    GASTROENTEROLOGY Pre-Procedure H and P      Impression/Plan:   1. This patient is consented for an EGD for refractory GERD. Chief Complaint: refractory GERD      HPI:  Iveth Beebe is a 63 y.o. female who presents for an EGD for evaluation of refractory GERD.       PMH:   Past Medical History:   Diagnosis Date    Adverse effect of anesthesia     Hypothermia    Cardiomegaly     pt unaware    Diverticulosis     GERD (gastroesophageal reflux disease)     H/O tubal ligation     H/O: hysterectomy     Hiatal hernia 2007    History of appendectomy     Hypercholesterolemia     Hypertension     Hypothermia due to anesthesia 2010    During  Breast surgery    Obesity, unspecified     SUBHASH on CPAP 4/8/2013    S/P mastectomy, bilateral     2010 : preventative    Undiagnosed cardiac murmurs     pt unaware    Unspecified adverse effect of anesthesia     was hypothermic in past       PSH:   Past Surgical History:   Procedure Laterality Date    COLONOSCOPY N/A 6/25/2020    COLONOSCOPY with Polypectomies performed by Johnna Gonzales MD at [place] [address]  2011    nipple reconstruction 2011    HX CERVICAL FUSION  2018    HX HYSTERECTOMY  2003    HX MASTECTOMY Bilateral 2010    bilateral (Preventive)    HX OOPHORECTOMY  2010       Social HX:   Social History     Socioeconomic History    Marital status:      Spouse name: Not on file    Number of children: Not on file    Years of education: Not on file    Highest education level: Not on file   Occupational History    Not on file   Tobacco Use    Smoking status: Never Smoker    Smokeless tobacco: Never Used   Vaping Use    Vaping Use: Never used   Substance and Sexual Activity    Alcohol use: Not Currently     Comment: 30 years ago    Drug use: Never    Sexual activity: Yes     Partners: Male     Birth control/protection: Surgical   Other Topics Concern    Not on file   Social History Narrative    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 Strain:     Difficulty of Paying Living Expenses:    Food Insecurity:     Worried About [address] in the Last Year:     [address] in the Last Year:    Transportation Needs:     Lack of Transportation (Medical):  Lack of Transportation (Non-Medical):    Physical Activity:     Days of Exercise per Week:     Minutes of Exercise per Session:    Stress:     Feeling of Stress :    Social Connections:     Frequency of Communication with Friends and Family:     Frequency of Social Gatherings with Friends and Family:     Attends Jew Services:     Active Member of Clubs or Organizations:     Attends Club or Organization Meetings:     Marital Status:    Intimate Partner Violence:     Fear of Current or Ex-Partner:     Emotionally Abused:     Physically Abused:     Sexually Abused:        FHX:   Family History   Problem Relation Age of Onset    Cancer Mother         breast    Cancer Sister         breast    Diabetes Maternal Grandmother     Hypertension Maternal Grandmother     Cancer Maternal Grandmother         ovarian    Heart Disease Maternal Grandfather 61        MI    Hypertension Maternal Aunt     Other Maternal Aunt         renal failure    Hypertension Maternal Uncle        Allergy:   Allergies   Allergen Reactions    Bactrim [Sulfamethoprim Ds] Other (comments)     Made mouth feel thick, speech slurred       Home Medications:     Medications Prior to Admission   Medication Sig    cloNIDine (CATAPRES) 0.1 mg/24 hr ptwk APPLY 1 PATCH TO SKIN AND CHANGE EVERY 7 DAYS    simvastatin (ZOCOR) 20 mg tablet TAKE 1 TABLET BY MOUTH EVERYDAY AT BEDTIME    levalbuterol tartrate (XOPENEX) 45 mcg/actuation inhaler INHALE 1 TO 2 PUFFS BY MOUTH EVERY 4 TO 6 HOURS AS NEEDED    fluticasone propionate (FLONASE) 50 mcg/actuation nasal spray 2 Sprays by Both Nostrils route daily.     lisinopril-hydroCHLOROthiazide (PRINZIDE, ZESTORETIC) 20-12.5 mg per tablet TAKE 1 TABLET BY MOUTH BID    sodium chloride (Saline Nasal Mist) 0.65 % nasal squeeze bottle 0.05 mL by Both Nostrils route as needed for Congestion.  omeprazole (PRILOSEC) 40 mg capsule Take 40 mg by mouth daily.  cyclobenzaprine (FLEXERIL) 5 mg tablet TAKE 1-2 TABLETS BY MOUTH AT NIGHT.  YUVAFEM 10 mcg tab vaginal tablet INSERT 1 APPLICATORFUL VAGINALLY AT BEDTIME FOR 14 DAYS THEN TWICE PER WEEK    valACYclovir (VALTREX) 500 mg tablet Take 500 mg by mouth daily.  vitamin e 400 unit tab Take 1 Cap by mouth daily.  Cholecalciferol, Vitamin D3, (VITAMIN D) 2,000 unit cap capsule Take 2,000 Units by mouth daily.  cpap machine kit by Does Not Apply route. Replace mask, headgear, heated hose, filters and accessories as needed for one year. CPAP pressure: 9 cwp  Mask: standard Mirage FX plus Opus nasal pillows mask, or mask of choice  Homecare Company: [employer]  Download data 30 days after initiation of therapy    LACTOBACILLUS ACIDOPHILUS (PROBIOTIC PO) Take  by mouth. Review of Systems:     A complete 10 point review of systems was performed and pertinents are as per the HPI. Remainder of the review of systems was negative. Visit Vitals  BP (!) 152/84   Pulse 80   Temp 98.4 °F (36.9 °C)   Resp 20   Ht 5' 5\" (1.651 m)   Wt 88.5 kg (195 lb)   SpO2 100%   BMI 32.45 kg/m²       Physical Assessment:     General: alert, cooperative, no acute distress, appears stated age. HEENT: normocephalic, no scleral icterus, moist mucous membranes, EOMs intact, no neck masses noted. Respiratory: lungs clear to auscultation bilaterally. Cardiovascular: regular rate and rhythm, no murmurs, rubs or gallops. Abdomen: normal bowel sounds, soft, non-tender to palpation. Extremities: no lower extremity edema, no cyanosis or clubbing. Neuro: alert and oriented x 3; non-focal exam.  Skin: no rashes. Psych: normal mood and affect. Mireille Street MD  [facility]  [email or web address]  Phone: [phone]  Pager: [phone]  [email or web address]

## 2021-07-27 NOTE — ANESTHESIA POSTPROCEDURE EVALUATION
Procedure(s):  UPPER ENDOSCOPY. MAC    Anesthesia Post Evaluation      Multimodal analgesia: multimodal analgesia used between 6 hours prior to anesthesia start to PACU discharge  Patient location during evaluation: bedside  Patient participation: complete - patient participated  Level of consciousness: awake  Pain score: 0  Airway patency: patent  Anesthetic complications: no  Cardiovascular status: acceptable, blood pressure returned to baseline and hemodynamically stable  Respiratory status: acceptable  Hydration status: acceptable  Post anesthesia nausea and vomiting:  none      INITIAL Post-op Vital signs:   Vitals Value Taken Time   BP     Temp     Pulse 71 07/27/21 0947   Resp 24 07/27/21 0947   SpO2 97 % 07/27/21 0947   Vitals shown include unvalidated device data.

## 2021-08-04 ENCOUNTER — VIRTUAL VISIT (OUTPATIENT)
Dept: INTERNAL MEDICINE CLINIC | Age: 56
End: 2021-08-04
Payer: COMMERCIAL

## 2021-08-04 DIAGNOSIS — J01.90 ACUTE NON-RECURRENT SINUSITIS, UNSPECIFIED LOCATION: Primary | ICD-10-CM

## 2021-08-04 DIAGNOSIS — R05.9 COUGH: ICD-10-CM

## 2021-08-04 DIAGNOSIS — J30.1 SEASONAL ALLERGIC RHINITIS DUE TO POLLEN: ICD-10-CM

## 2021-08-04 PROCEDURE — 99213 OFFICE O/P EST LOW 20 MIN: CPT | Performed by: INTERNAL MEDICINE

## 2021-08-04 RX ORDER — FLUTICASONE PROPIONATE 50 MCG
2 SPRAY, SUSPENSION (ML) NASAL DAILY
Qty: 1 BOTTLE | Refills: 5 | Status: SHIPPED | OUTPATIENT
Start: 2021-08-04 | End: 2022-03-01 | Stop reason: SDUPTHER

## 2021-08-04 RX ORDER — CODEINE PHOSPHATE AND GUAIFENESIN 10; 100 MG/5ML; MG/5ML
10 SOLUTION ORAL
Qty: 120 ML | Refills: 0 | Status: SHIPPED | OUTPATIENT
Start: 2021-08-04 | End: 2021-08-11

## 2021-08-04 RX ORDER — AZITHROMYCIN 250 MG/1
TABLET, FILM COATED ORAL
Qty: 6 TABLET | Refills: 0 | Status: SHIPPED | OUTPATIENT
Start: 2021-08-04 | End: 2021-08-09

## 2021-08-04 NOTE — PROGRESS NOTES
Danica Velasquez 63 y.o. female who was seen by synchronous (real-time) audio-video technology on 08/04/21    Consent:  sheand/or her healthcare decision maker is aware that this patient-initiated Telehealth encounter is a billable service, with coverage as determined by her insurance carrier. she is aware that she may receive a bill and has provided verbal consent to proceed: Yes I was in my office while conducting this encounter. The patient was in their home. Danica Velasquez is a 63 y.o.  female and presents with Cough and Sinus Infection      SUBJECTIVE:    Sinus Pain  Patient complains of congestion, headache described as pressure behind eyes , nasal congestion, post nasal drip, productive cough with  yellow colored sputum and sinus pressure. Symptoms include no  fever with no fever, chills, or night sweats. Onset of symptoms was 5 days ago, unchanged since that time. She is drinking plenty of fluids. .  Past history is significant for no history of pneumonia or bronchitis. Patient is non-smoker. Used OTC sinus meds and Naprosyn without any significant improvement. Respiratory ROS: negative for - shortness of breath  Cardiovascular ROS: negative for - chest pain    Current Outpatient Medications   Medication Sig    azithromycin (ZITHROMAX) 250 mg tablet Take 2 tablets today, then take 1 tablet daily    fluticasone propionate (FLONASE) 50 mcg/actuation nasal spray 2 Sprays by Both Nostrils route daily.  guaiFENesin-codeine (Cheratussin AC) 100-10 mg/5 mL solution Take 10 mL by mouth four (4) times daily as needed for Cough for up to 7 days. Max Daily Amount: 40 mL.     cloNIDine (CATAPRES) 0.1 mg/24 hr ptwk APPLY 1 PATCH TO SKIN AND CHANGE EVERY 7 DAYS    simvastatin (ZOCOR) 20 mg tablet TAKE 1 TABLET BY MOUTH EVERYDAY AT BEDTIME    levalbuterol tartrate (XOPENEX) 45 mcg/actuation inhaler INHALE 1 TO 2 PUFFS BY MOUTH EVERY 4 TO 6 HOURS AS NEEDED    lisinopril-hydroCHLOROthiazide (PRINZIDE, ZESTORETIC) 20-12.5 mg per tablet TAKE 1 TABLET BY MOUTH BID    sodium chloride (Saline Nasal Mist) 0.65 % nasal squeeze bottle 0.05 mL by Both Nostrils route as needed for Congestion.  omeprazole (PRILOSEC) 40 mg capsule Take 40 mg by mouth daily.  cyclobenzaprine (FLEXERIL) 5 mg tablet TAKE 1-2 TABLETS BY MOUTH AT NIGHT.  YUVAFEM 10 mcg tab vaginal tablet INSERT 1 APPLICATORFUL VAGINALLY AT BEDTIME FOR 14 DAYS THEN TWICE PER WEEK    LACTOBACILLUS ACIDOPHILUS (PROBIOTIC PO) Take  by mouth.  valACYclovir (VALTREX) 500 mg tablet Take 500 mg by mouth daily.  vitamin e 400 unit tab Take 1 Cap by mouth daily.  Cholecalciferol, Vitamin D3, (VITAMIN D) 2,000 unit cap capsule Take 2,000 Units by mouth daily.  cpap machine kit by Does Not Apply route. Replace mask, headgear, heated hose, filters and accessories as needed for one year. CPAP pressure: 9 cwp  Mask: standard Mirage FX plus Opus nasal pillows mask, or mask of choice  [employer]: [employer]  Download data 30 days after initiation of therapy     No current facility-administered medications for this visit. OBJECTIVE:  alert, well appearing, and in no distress  Patient-Reported Vitals 8/4/2021   Patient-Reported Weight unsure   Patient-Reported Height 55   Patient-Reported Pulse 70   Patient-Reported Temperature 97   Patient-Reported SpO2 99   Patient-Reported Systolic  075   Patient-Reported Diastolic 86   Patient-Reported Peak Flow N/A   Patient-Reported LMP N/A           well developed and well nourished            Assessment/Plan      ICD-10-CM ICD-9-CM    1. Acute non-recurrent sinusitis, unspecified location  J01.90 461.9 azithromycin (ZITHROMAX) 250 mg tablet   2. Seasonal allergic rhinitis due to pollen  J30.1 477.0 fluticasone propionate (FLONASE) 50 mcg/actuation nasal spray   3.  Cough  R05 786.2 guaiFENesin-codeine (Cheratussin AC) 100-10 mg/5 mL solution     Follow-up and Dispositions    · Return if symptoms worsen or fail to improve. Reviewed plan of care. Patient has provided input and agrees with goals.

## 2021-09-17 DIAGNOSIS — I10 ESSENTIAL HYPERTENSION: ICD-10-CM

## 2021-09-17 RX ORDER — LISINOPRIL AND HYDROCHLOROTHIAZIDE 12.5; 2 MG/1; MG/1
TABLET ORAL
Qty: 180 TABLET | Refills: 2 | Status: SHIPPED | OUTPATIENT
Start: 2021-09-17 | End: 2022-09-14

## 2021-09-21 DIAGNOSIS — E04.9 GOITER: ICD-10-CM

## 2021-09-22 ENCOUNTER — HOSPITAL ENCOUNTER (OUTPATIENT)
Dept: ULTRASOUND IMAGING | Age: 56
Discharge: HOME | End: 2021-09-22
Attending: INTERNAL MEDICINE
Payer: OTHER GOVERNMENT

## 2021-09-22 PROCEDURE — 76536 US EXAM OF HEAD AND NECK: CPT

## 2021-10-06 ENCOUNTER — OFFICE VISIT (OUTPATIENT)
Dept: CARDIOLOGY CLINIC | Age: 56
End: 2021-10-06
Payer: COMMERCIAL

## 2021-10-06 VITALS
SYSTOLIC BLOOD PRESSURE: 130 MMHG | HEART RATE: 63 BPM | HEIGHT: 65 IN | BODY MASS INDEX: 31.32 KG/M2 | DIASTOLIC BLOOD PRESSURE: 87 MMHG | WEIGHT: 188 LBS

## 2021-10-06 DIAGNOSIS — E66.9 OBESITY (BMI 30.0-34.9): ICD-10-CM

## 2021-10-06 DIAGNOSIS — R07.89 OTHER CHEST PAIN: Primary | ICD-10-CM

## 2021-10-06 DIAGNOSIS — E78.00 HYPERCHOLESTEREMIA: ICD-10-CM

## 2021-10-06 DIAGNOSIS — I10 PRIMARY HYPERTENSION: ICD-10-CM

## 2021-10-06 PROCEDURE — 99203 OFFICE O/P NEW LOW 30 MIN: CPT | Performed by: INTERNAL MEDICINE

## 2021-10-06 PROCEDURE — 93000 ELECTROCARDIOGRAM COMPLETE: CPT | Performed by: INTERNAL MEDICINE

## 2021-10-06 NOTE — PATIENT INSTRUCTIONS
There are no discontinued medications. Learning About the [address] [facility] Lola Pirindola Diet  What is the Mediterranean diet? The Mediterranean diet is a style of eating rather than a diet plan. It features foods eaten in [place], [place], [place] and [place], and other countries along the [facility]. It emphasizes eating foods like fish, fruits, vegetables, beans, high-fiber breads and whole grains, nuts, and olive oil. This style of eating includes limited red meat, cheese, and sweets. Why choose the Mediterranean diet? A Mediterranean-style diet may improve heart health. It contains more fat than other heart-healthy diets. But the fats are mainly from nuts, unsaturated oils (such as fish oils and olive oil), and certain nut or seed oils (such as canola, soybean, or flaxseed oil). These fats may help protect the heart and blood vessels. How can you get started on the Mediterranean diet? Here are some things you can do to switch to a more Mediterranean way of eating. What to eat  · Eat a variety of fruits and vegetables each day, such as grapes, blueberries, tomatoes, broccoli, peppers, figs, olives, spinach, eggplant, beans, lentils, and chickpeas. · Eat a variety of whole-grain foods each day, such as oats, brown rice, and whole wheat bread, pasta, and couscous. · Eat fish at least 2 times a week. Try tuna, salmon, mackerel, lake trout, herring, or sardines. · Eat moderate amounts of low-fat dairy products, such as milk, cheese, or yogurt. · Eat moderate amounts of poultry and eggs. · Choose healthy (unsaturated) fats, such as nuts, olive oil, and certain nut or seed oils like canola, soybean, and flaxseed. · Limit unhealthy (saturated) fats, such as butter, palm oil, and coconut oil. And limit fats found in animal products, such as meat and dairy products made with whole milk. Try to eat red meat only a few times a month in very small amounts.   · Limit sweets and desserts to only a few times a week. This includes sugar-sweetened drinks like soda. The Mediterranean diet may also include red wine with your meal--1 glass each day for women and up to 2 glasses a day for men. Tips for eating at home  · Use herbs, spices, garlic, lemon zest, and citrus juice instead of salt to add flavor to foods. · Add avocado slices to your sandwich instead of santiago. · Have fish for lunch or dinner instead of red meat. Brush the fish with olive oil, and broil or grill it. · Sprinkle your salad with seeds or nuts instead of cheese. · Cook with olive or canola oil instead of butter or oils that are high in saturated fat. · Switch from 2% milk or whole milk to 1% or fat-free milk. · Dip raw vegetables in a vinaigrette dressing or hummus instead of dips made from mayonnaise or sour cream.  · Have a piece of fruit for dessert instead of a piece of cake. Try baked apples, or have some dried fruit. Tips for eating out  · Try broiled, grilled, baked, or poached fish instead of having it fried or breaded. · Ask your  to have your meals prepared with olive oil instead of butter. · Order dishes made with marinara sauce or sauces made from olive oil. Avoid sauces made from cream or mayonnaise. · Choose whole-grain breads, whole wheat pasta and pizza crust, brown rice, beans, and lentils. · Cut back on butter or margarine on bread. Instead, you can dip your bread in a small amount of olive oil. · Ask for a side salad or grilled vegetables instead of french fries or chips. Where can you learn more? Go to http://www.espinosa.com/  Enter O407 in the search box to learn more about \"Learning About the Mediterranean Diet. \"  Current as of: December 17, 2020               Content Version: 13.0  © 6678-3899 [employer]wise, Incorporated. Care instructions adapted under license by [employer] (which disclaims liability or warranty for this information).  If you have questions about a medical condition or this instruction, always ask your healthcare professional. [place] [address] any warranty or liability for your use of this information.

## 2021-10-06 NOTE — PROGRESS NOTES
HISTORY OF PRESENT ILLNESS  Elizabeth Beck is a 63 y.o. female. 10/21 seen after many years for chest discomfort. Chest Pain (Angina)   The history is provided by the patient. This is a new problem. The current episode started more than 1 week ago (9/21). Duration of episode(s) is 30 minutes. Episode frequency: 1-2/month. The pain is associated with normal activity and rest. The pain is present in the substernal region. The quality of the pain is described as pressure-like. The pain does not radiate. Pertinent negatives include no claudication, no cough, no diaphoresis, no dizziness, no fever, no headaches, no malaise/fatigue, no nausea, no orthopnea, no palpitations, no PND, no shortness of breath and no vomiting.      Allergies   Allergen Reactions    Bactrim [Sulfamethoprim Ds] Other (comments)     Made mouth feel thick, speech slurred       Past Medical History:   Diagnosis Date    Adverse effect of anesthesia     Hypothermia    Cardiomegaly     pt unaware    Diverticulosis     GERD (gastroesophageal reflux disease)     H/O tubal ligation     H/O: hysterectomy     Hiatal hernia 2007    History of appendectomy     Hypercholesterolemia     Hypertension     Hypothermia due to anesthesia 2010    During  Breast surgery    Obesity, unspecified     SUBHASH on CPAP 4/8/2013    S/P mastectomy, bilateral     2010 : prevent[facility] to [facility]    Undiagnosed cardiac murmurs     pt unaware    Unspecified adverse effect of anesthesia     was hypothermic in past       Family History   Problem Relation Age of Onset    Cancer Mother         breast    Cancer Sister         breast    Diabetes Maternal Grandmother     Hypertension Maternal Grandmother     Cancer Maternal Grandmother         ovarian    Heart Disease Maternal Grandfather 61        MI    Hypertension Maternal Aunt     Other Maternal Aunt         renal failure    Hypertension Maternal Uncle     Stroke Neg Hx     Heart Attack Neg Hx Social History     Tobacco Use    Smoking status: Never Smoker    Smokeless tobacco: Never Used   Vaping Use    Vaping Use: Never used   Substance Use Topics    Alcohol use: Not Currently     Comment: 30 years ago    Drug use: Never        Current Outpatient Medications   Medication Sig    lisinopril-hydroCHLOROthiazide (PRINZIDE, ZESTORETIC) 20-12.5 mg per tablet TAKE 1 TABLET BY MOUTH TWICE A DAY    fluticasone propionate (FLONASE) 50 mcg/actuation nasal spray 2 Sprays by Both Nostrils route daily.  cloNIDine (CATAPRES) 0.1 mg/24 hr ptwk APPLY 1 PATCH TO SKIN AND CHANGE EVERY 7 DAYS    simvastatin (ZOCOR) 20 mg tablet TAKE 1 TABLET BY MOUTH EVERYDAY AT BEDTIME (Patient taking differently: 40 mg.)    levalbuterol tartrate (XOPENEX) 45 mcg/actuation inhaler INHALE 1 TO 2 PUFFS BY MOUTH EVERY 4 TO 6 HOURS AS NEEDED    sodium chloride (Saline Nasal Mist) 0.65 % nasal squeeze bottle 0.05 mL by Both Nostrils route as needed for Congestion.  omeprazole (PRILOSEC) 40 mg capsule Take 40 mg by mouth daily.  cyclobenzaprine (FLEXERIL) 5 mg tablet TAKE 1-2 TABLETS BY MOUTH AT NIGHT.  YUVAFEM 10 mcg tab vaginal tablet INSERT 1 APPLICATORFUL VAGINALLY AT BEDTIME FOR 14 DAYS THEN TWICE PER WEEK    LACTOBACILLUS ACIDOPHILUS (PROBIOTIC PO) Take  by mouth.  valACYclovir (VALTREX) 500 mg tablet Take 500 mg by mouth daily.  vitamin e 400 unit tab Take 1 Cap by mouth daily.  Cholecalciferol, Vitamin D3, (VITAMIN D) 2,000 unit cap capsule Take 2,000 Units by mouth daily.  cpap machine kit by Does Not Apply route. Replace mask, headgear, heated hose, filters and accessories as needed for one year. CPAP pressure: 9 cwp  Mask: standard Mirage FX plus Opus nasal pillows mask, or mask of choice  Home[employer]: [employer]  Download data 30 days after initiation of therapy     No current facility-administered medications for this visit.         Past Surgical History:   Procedure Laterality Date    COLONOSCOPY N/A 6/25/2020    COLONOSCOPY with Polypectomies performed by Niles Ibrahim [facility] at [facility]  2011    nipple reconstruction 2011    HX CERVICAL FUSION  2018    HX HYSTERECTOMY  2003    HX MASTECTOMY Bilateral 2010    bilateral (Preventive)    HX OOPHORECTOMY  2010       Visit Vitals  /87 (BP 1 Location: Left upper arm, BP Patient Position: Sitting, BP Cuff Size: Adult)   Pulse 63   Ht 5' 5\" (1.651 m)   Wt 85.3 kg (188 lb)   BMI 31.28 kg/m²       Diagnostic Studies:  I have reviewed the relevant tests done on the patient and show as follows  EKG tracings reviewed by me today. EKG Results     Procedure 720 Value Units Date/Time    AMB POC EKG ROUTINE W/ 12 LEADS, INTER & REP [[id number]] Resulted: 10/06/21 0944    Order Status: Completed Updated: 10/06/21 0946        XR Results (most recent):  Results from [place] encounter on 05/04/20    XR SPINE LUMB MIN 4 V    Narrative  EXAM: X-ray of the Lumbar Spine. INDICATION: back pain Back pain    TECHNIQUE: AP, lateral, bilateral oblique and spot views of the lumbar spine  obtained. COMPARISON: None    FINDINGS: There are 5 lumbar-type vertebra. There is 5 mm anterolisthesis of L4  on L5. No fracture appreciated. The facets are appropriately aligned. Mild disc  space height loss is noted multiple levels with mild endplate osteophyte  formation is noted. Multilevel facet arthropathy is noted. This is most  pronounced on the left at L4-L5. Impression  IMPRESSION:  1. Mild multilevel degenerative disc disease with mild anterolisthesis of L4 on  L5. Ms. Ignacio Rizzo has a reminder for a \"due or due soon\" health maintenance. I have asked that she contact her primary care provider for follow-up on this health maintenance. Review of Systems   Constitutional: Negative for chills, diaphoresis, fever, malaise/fatigue and weight loss. HENT: Negative for nosebleeds.     Eyes: Negative for discharge. Respiratory: Negative for cough, shortness of breath and wheezing. Cardiovascular: Positive for chest pain. Negative for palpitations, orthopnea, claudication, leg swelling and PND. Gastrointestinal: Negative for diarrhea, nausea and vomiting. Genitourinary: Negative for dysuria and hematuria. Musculoskeletal: Negative for joint pain. Skin: Negative for rash. Neurological: Negative for dizziness, seizures, loss of consciousness and headaches. Endo/Heme/Allergies: Negative for polydipsia. Does not bruise/bleed easily. Psychiatric/Behavioral: Negative for depression and substance abuse. The patient does not have insomnia. Physical Exam  Constitutional:       General: She is not in acute distress. Appearance: She is well-developed. She is obese. HENT:      Head: Normocephalic and atraumatic. Mouth/Throat:      Dentition: Normal dentition. Eyes:      General: No scleral icterus. Right eye: No discharge. Left eye: No discharge. Neck:      Thyroid: No thyromegaly. Vascular: No carotid bruit or JVD. Cardiovascular:      Rate and Rhythm: Normal rate and regular rhythm. Pulses: Intact distal pulses. Heart sounds: Normal heart sounds, S1 normal and S2 normal. No murmur heard. No friction rub. No gallop. Pulmonary:      Effort: Pulmonary effort is normal.      Breath sounds: Normal breath sounds. No wheezing or rales. Abdominal:      Palpations: Abdomen is soft. There is no mass. Tenderness: There is no abdominal tenderness. Musculoskeletal:      Cervical back: Neck supple. Right lower leg: No edema. Left lower leg: No edema. Lymphadenopathy:      Cervical:      Right cervical: No superficial cervical adenopathy. Left cervical: No superficial cervical adenopathy. Skin:     General: Skin is warm and dry. Findings: No rash.    Neurological:      Mental Status: She is alert and oriented to person, place, and time.   Psychiatric:         Behavior: Behavior normal.         ASSESSMENT and PLAN    Results for Sukhwinder Ribera (MRN [id number]) as of 10/6/2021 10:02   Ref. Range 8/5/2020 07:41 9/11/2020 14:17 12/22/2020 10:26 3/31/2021 12:47 7/14/2021 08:51   Triglyceride Latest Ref Range: <150 mg/dL 104  139 101 108   Cholesterol, total Latest Ref Range: <200 mg/dL 163  169 195 157   HDL Cholesterol Latest Ref Range: > OR = 50 mg/dL 51  47 (L) 55 52   Non-HDL Cholesterol Latest Ref Range: <130 mg/dL (calc) 112  122 140 (H) 105   LDL-CHOLESTEROL Latest Units: mg/dL (calc) 92  98 120 (H) 85   Cholesterol/HDL ratio Latest Ref Range: <5.0 (calc) 3.2  3.6 3.5 3.0   Hemoglobin A1c, (calculated) Latest Ref Range: <5.7 % of total Hgb 5.5  5.8 (H) 5.7 (H) 5.5         Diagnoses and all orders for this visit:    1. Other chest pain  -     EXERCISE CARDIAC STRESS TEST; Future    2. Primary hypertension  -     AMB POC EKG ROUTINE W/ 12 LEADS, INTER & REP    3. Hypercholesteremia    4. Obesity (BMI 30.0-34. 9)        Pertinent laboratory and test data reviewed and discussed with patient. See patient instructions also for other medical advice given    There are no discontinued medications. Follow-up and Dispositions    · Return in about 2 months (around 12/6/2021), or if symptoms worsen or fail to improve, for post test.       10/6/2021 chest pain is atypical but get a treadmill stress test given some anxiety. Blood pressure is controlled. Continue same medications  Lipids are improving and acceptable. Continue statins. Diet weight and exercise discussed. She is trying to lose weight. Mediterranean diet guidelines printed.

## 2021-10-13 ENCOUNTER — APPOINTMENT (OUTPATIENT)
Dept: INTERNAL MEDICINE CLINIC | Age: 56
End: 2021-10-13

## 2021-10-14 LAB
25(OH)D3 SERPL-MCNC: 31 NG/ML (ref 30–100)
ALB/GLOBRATIO, 58C: 1.5 (CALC) (ref 1–2.5)
ALBUMIN SERPL-MCNC: 4.4 G/DL (ref 3.6–5.1)
ALKALINE PHOSPHATASE, TOTAL, 25002000: 66 U/L (ref 37–153)
ALT SERPL-CCNC: 20 U/L (ref 6–29)
AST SERPL W P-5'-P-CCNC: 22 U/L (ref 10–35)
BILIRUB SERPL-MCNC: 0.6 MG/DL (ref 0.2–1.2)
BUN SERPL-MCNC: 12 MG/DL (ref 7–25)
BUN/CREATININE RATIO,BUCR: NORMAL (CALC) (ref 6–22)
CALCIUM SERPL-MCNC: 9.9 MG/DL (ref 8.6–10.4)
CHLORIDE SERPL-SCNC: 102 MMOL/L (ref 98–110)
CHOL/HDL RATIO,CHHDX: 3.2 (CALC)
CHOLEST SERPL-MCNC: 188 MG/DL
CO2 SERPL-SCNC: 29 MMOL/L (ref 20–32)
CREAT SERPL-MCNC: 0.87 MG/DL (ref 0.5–1.05)
GLOBULIN,GLOB: 3 G/DL (CALC) (ref 1.9–3.7)
GLUCOSE SERPL-MCNC: 81 MG/DL (ref 65–99)
HBA1C MFR BLD HPLC: 5.7 % OF TOTAL HGB
HDLC SERPL-MCNC: 59 MG/DL
LDL-CHOLESTEROL: 108 MG/DL (CALC)
NON-HDL CHOLESTEROL, 011976: 129 MG/DL (CALC)
POTASSIUM SERPL-SCNC: 3.6 MMOL/L (ref 3.5–5.3)
PROT SERPL-MCNC: 7.4 G/DL (ref 6.1–8.1)
SODIUM SERPL-SCNC: 142 MMOL/L (ref 135–146)
TRIGL SERPL-MCNC: 113 MG/DL (ref ?–150)

## 2021-11-04 ENCOUNTER — OFFICE VISIT (OUTPATIENT)
Dept: INTERNAL MEDICINE CLINIC | Age: 56
End: 2021-11-04
Payer: COMMERCIAL

## 2021-11-04 VITALS
DIASTOLIC BLOOD PRESSURE: 82 MMHG | TEMPERATURE: 97.9 F | HEIGHT: 65 IN | OXYGEN SATURATION: 96 % | BODY MASS INDEX: 32.49 KG/M2 | SYSTOLIC BLOOD PRESSURE: 119 MMHG | HEART RATE: 99 BPM | RESPIRATION RATE: 18 BRPM | WEIGHT: 195 LBS

## 2021-11-04 DIAGNOSIS — E55.9 VITAMIN D DEFICIENCY: ICD-10-CM

## 2021-11-04 DIAGNOSIS — R73.03 PREDIABETES: ICD-10-CM

## 2021-11-04 DIAGNOSIS — I10 ESSENTIAL HYPERTENSION: Primary | ICD-10-CM

## 2021-11-04 DIAGNOSIS — E78.00 PURE HYPERCHOLESTEROLEMIA: ICD-10-CM

## 2021-11-04 PROCEDURE — 99214 OFFICE O/P EST MOD 30 MIN: CPT | Performed by: INTERNAL MEDICINE

## 2021-11-04 NOTE — PROGRESS NOTES
Patient is in the office today for a 3 month follow up. 1. \"Have you been to the ER, urgent care clinic since your last visit? Hospitalized since your last visit? \" No    2. \"Have you seen or consulted any other health care providers outside of the [address] [place] [address] since your last visit? \" yes, Dr. Roman Cruz. 3. For patients aged 39-70: Has the patient had a colonoscopy? No     If the patient is female:    4. For patients aged 41-77: Has the patient had a mammogram within the past 2 years? No    5. For patients aged 21-65: Has the patient had a pap smear?  No

## 2021-11-04 NOTE — PROGRESS NOTES
Subjective:       Chief Complaint  The patient presents for follow up of hypertension and high cholesterol. Vit D deficiency, prediabetes         HPI  Laraine Prader is a 63 y.o. female seen for follow up of hyperlipidemia. Shealso has hypertension. Hypertension  well controlled, no significant medication side effects noted, on catapres which is also for hot flashes and Zestoretic 20/12.5 BID,  hyperlipidemia borderline controlled on Zocor 20 mg. Has recently regained some weight. No significant medication side effects noted, on Zocor 20 mg. Pt continues to work on losing weight. She will continue to try to AIM for fasting 16 hours and eating more protein and  Fiber. She has mild prediabeties which she is trying to control with cutting back starches and sweets and losing weight. .  She was on Metformin at one time. .     Diet and Lifestyle: generally follows a low fat low cholesterol diet, does not rigorously follow a diabetic diet, exercises regularly    Home BP Monitoring: is not measured at home. Other symptoms and concerns: vit D level is fairly  well controlled on  vit D 5000 units/week    Patient has gastroesophageal reflux disease that is controlled on Prilosec 40 mg daily. Current Outpatient Medications   Medication Sig    lisinopril-hydroCHLOROthiazide (PRINZIDE, ZESTORETIC) 20-12.5 mg per tablet TAKE 1 TABLET BY MOUTH TWICE A DAY    fluticasone propionate (FLONASE) 50 mcg/actuation nasal spray 2 Sprays by Both Nostrils route daily.  cloNIDine (CATAPRES) 0.1 mg/24 hr ptwk APPLY 1 PATCH TO SKIN AND CHANGE EVERY 7 DAYS    simvastatin (ZOCOR) 20 mg tablet TAKE 1 TABLET BY MOUTH EVERYDAY AT BEDTIME (Patient taking differently: 40 mg.)    levalbuterol tartrate (XOPENEX) 45 mcg/actuation inhaler INHALE 1 TO 2 PUFFS BY MOUTH EVERY 4 TO 6 HOURS AS NEEDED    sodium chloride (Saline Nasal Mist) 0.65 % nasal squeeze bottle 0.05 mL by Both Nostrils route as needed for Congestion.     omeprazole (PRILOSEC) 40 mg capsule Take 40 mg by mouth daily.  cyclobenzaprine (FLEXERIL) 5 mg tablet TAKE 1-2 TABLETS BY MOUTH AT NIGHT.  YUVAFEM 10 mcg tab vaginal tablet INSERT 1 APPLICATORFUL VAGINALLY AT BEDTIME FOR 14 DAYS THEN TWICE PER WEEK    LACTOBACILLUS ACIDOPHILUS (PROBIOTIC PO) Take  by mouth.  valACYclovir (VALTREX) 500 mg tablet Take 500 mg by mouth daily.  vitamin e 400 unit tab Take 1 Cap by mouth daily.  Cholecalciferol, Vitamin D3, (VITAMIN D) 2,000 unit cap capsule Take 2,000 Units by mouth daily.  cpap machine kit by Does Not Apply route. Replace mask, headgear, heated hose, filters and accessories as needed for one year. CPAP pressure: 9 cwp  Mask: standard Mirage FX plus Opus nasal pillows mask, or mask of choice  [employer]: [employer]  Download data 30 days after initiation of therapy     No current facility-administered medications for this visit.              Review of Systems  Respiratory: negative for dyspnea on exertion  Cardiovascular: negative for chest pain    Objective:     Visit Vitals  /82 (BP 1 Location: Left arm, BP Patient Position: Sitting, BP Cuff Size: Adult)   Pulse 99   Temp 97.9 °F (36.6 °C) (Temporal)   Resp 18   Ht 5' 5\" (1.651 m)   Wt 195 lb (88.5 kg)   SpO2 96%   BMI 32.45 kg/m²        General appearance - alert, well appearing, and in no distress  Chest - clear to auscultation, no wheezes, rales or rhonchi, symmetric air entry  Heart - normal rate, regular rhythm, normal S1, S2, no murmurs, rubs, clicks or gallops  Extremities - peripheral pulses normal, no pedal edema, no clubbing or cyanosis  Skin - normal coloration and turgor, no rashes, no suspicious skin lesions noted      Labs:   Lab Results   Component Value Date/Time    Cholesterol, total 188 10/13/2021 01:42 PM    HDL Cholesterol 59 10/13/2021 01:42 PM    LDL-CHOLESTEROL 108 (H) 10/13/2021 01:42 PM    LDL, calculated 101 (H) 08/12/2015 09:00 AM    LDL-C, External 96 04/23/2015 12:00 AM Triglyceride 113 10/13/2021 01:42 PM    Cholesterol/HDL ratio 3.2 10/13/2021 01:42 PM     Lab Results   Component Value Date/Time    Sodium 142 10/13/2021 01:42 PM    Potassium 3.6 10/13/2021 01:42 PM    Chloride 102 10/13/2021 01:42 PM    CO2 29 10/13/2021 01:42 PM    Anion gap 5 10/18/2016 05:28 PM    Glucose 81 10/13/2021 01:42 PM    BUN 12 10/13/2021 01:42 PM    Creatinine 0.87 10/13/2021 01:42 PM    BUN/Creatinine ratio NOT APPLICABLE 16/26/6068 98:06 PM    GFR est AA 86 10/13/2021 01:42 PM    GFR est non-AA 74 10/13/2021 01:42 PM    Calcium 9.9 10/13/2021 01:42 PM    Bilirubin, total 0.6 10/13/2021 01:42 PM    ALT (SGPT) 20 10/13/2021 01:42 PM    Alk. phosphatase 75 01/29/2020 09:48 AM    Protein, total 7.4 10/13/2021 01:42 PM    Albumin 4.4 10/13/2021 01:42 PM    Globulin 3.0 10/13/2021 01:42 PM    A-G Ratio 1.1 10/18/2016 05:28 PM        Labs:   Lab Results   Component Value Date/Time    WBC 3.3 (L) 07/18/2019 08:56 AM    HGB 13.5 07/18/2019 08:56 AM    HCT 40.8 07/18/2019 08:56 AM    PLATELET 532 15/86/9617 08:56 AM    MCV 93.2 07/18/2019 08:56 AM     Lab Results   Component Value Date/Time    Hemoglobin A1c 5.7 (H) 10/13/2021 01:42 PM    Hemoglobin A1c, External 5.8 11/11/2016 12:00 AM            Assessment / Plan     Hypertension well controlled, on current meds. Will try and improve further with weight loss before considering cutting back medications  Hyperlipidemia borderline controlled, on Zocor 20 mg. Will try to improve it further with diet and weight loss. ICD-10-CM ICD-9-CM    1. Essential hypertension  D61 664.8 METABOLIC PANEL, COMPREHENSIVE      MICROALBUMIN, UR, RAND W/ MICROALB/CREAT RATIO   2. Pure hypercholesterolemia  E78.00 272.0 LIPID PANEL   3. Prediabetes  R73.03 790.29  patient continues to work on trying to lose weight and improve diet to keep this under control HEMOGLOBIN A1C W/O EAG   4.  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 Follow-up and Dispositions    · Return in about 3 months (around 2/4/2022) for labs 1 week before. Reviewed plan of care. Patient has provided input and agrees with goals.

## 2021-11-04 NOTE — PATIENT INSTRUCTIONS
High Blood Pressure: Care Instructions  Overview     It's normal for blood pressure to go up and down throughout the day. But if it stays up, you have high blood pressure. Another name for high blood pressure is hypertension. Despite what a lot of people think, high blood pressure usually doesn't cause headaches or make you feel dizzy or lightheaded. It usually has no symptoms. But it does increase your risk of stroke, heart attack, and other problems. You and your doctor will talk about your risks of these problems based on your blood pressure. Your doctor will give you a goal for your blood pressure. Your goal will be based on your health and your age. Lifestyle changes, such as eating healthy and being active, are always important to help lower blood pressure. You might also take medicine to reach your blood pressure goal.  Follow-up care is a key part of your treatment and safety. Be sure to make and go to all appointments, and call your doctor if you are having problems. It's also a good idea to know your test results and keep a list of the medicines you take. How can you care for yourself at home? Medical treatment  · If you stop taking your medicine, your blood pressure will go back up. You may take one or more types of medicine to lower your blood pressure. Be safe with medicines. Take your medicine exactly as prescribed. Call your doctor if you think you are having a problem with your medicine. · Talk to your doctor before you start taking aspirin every day. Aspirin can help certain people lower their risk of a heart attack or stroke. But taking aspirin isn't right for everyone, because it can cause serious bleeding. · See your doctor regularly. You may need to see the doctor more often at first or until your blood pressure comes down. · If you are taking blood pressure medicine, talk to your doctor before you take decongestants or anti-inflammatory medicine, such as ibuprofen.  Some of these medicines can raise blood pressure. · Learn how to check your blood pressure at home. Lifestyle changes  · Stay at a healthy weight. This is especially important if you put on weight around the waist. Losing even 10 pounds can help you lower your blood pressure. · If your doctor recommends it, get more exercise. Walking is a good choice. Bit by bit, increase the amount you walk every day. Try for at least 30 minutes on most days of the week. You also may want to swim, bike, or do other activities. · Avoid or limit alcohol. Talk to your doctor about whether you can drink any alcohol. · Try to limit how much sodium you eat to less than 2,300 milligrams (mg) a day. Your doctor may ask you to try to eat less than 1,500 mg a day. · Eat plenty of fruits (such as bananas and oranges), vegetables, legumes, whole grains, and low-fat dairy products. · Lower the amount of saturated fat in your diet. Saturated fat is found in animal products such as milk, cheese, and meat. Limiting these foods may help you lose weight and also lower your risk for heart disease. · Do not smoke. Smoking increases your risk for heart attack and stroke. If you need help quitting, talk to your doctor about stop-smoking programs and medicines. These can increase your chances of quitting for good. When should you call for help? Call 911  anytime you think you may need emergency care. This may mean having symptoms that suggest that your blood pressure is causing a serious heart or blood vessel problem. Your blood pressure may be over 180/120. For example, call 911 if:    · You have symptoms of a heart attack. These may include:  ? Chest pain or pressure, or a strange feeling in the chest.  ? Sweating. ? Shortness of breath. ? Nausea or vomiting. ? Pain, pressure, or a strange feeling in the back, neck, jaw, or upper belly or in one or both shoulders or arms. ? Lightheadedness or sudden weakness.   ? A fast or irregular heartbeat.     · You have symptoms of a stroke. These may include:  ? Sudden numbness, tingling, weakness, or loss of movement in your face, arm, or leg, especially on only one side of your body. ? Sudden vision changes. ? Sudden trouble speaking. ? Sudden confusion or trouble understanding simple statements. ? Sudden problems with walking or balance. ? A sudden, severe headache that is different from past headaches.     · You have severe back or belly pain. Do not wait until your blood pressure comes down on its own. Get help right away. Call your doctor now or seek immediate care if:    · Your blood pressure is much higher than normal (such as 180/120 or higher), but you don't have symptoms.     · You think high blood pressure is causing symptoms, such as:  ? Severe headache.  ? Blurry vision. Watch closely for changes in your health, and be sure to contact your doctor if:    · Your blood pressure measures higher than your doctor recommends at least 2 times. That means the top number is higher or the bottom number is higher, or both.     · You think you may be having side effects from your blood pressure medicine. Where can you learn more? Go to http://www.gray.com/  Enter [id number] in the search box to learn more about \"High Blood Pressure: Care Instructions. \"  Current as of: April 29, 2021               Content Version: 13.0  © 2006-2021 [employer]. Care instructions adapted under license by [employer] (which disclaims liability or warranty for this information). If you have questions about a medical condition or this instruction, always ask your healthcare professional. [place] [address] any warranty or liability for your use of this information.

## 2021-12-21 ENCOUNTER — PATIENT MESSAGE (OUTPATIENT)
Dept: INTERNAL MEDICINE CLINIC | Age: 56
End: 2021-12-21

## 2021-12-21 NOTE — TELEPHONE ENCOUNTER
From: Melvi Ashton  To: Irene Sutton MD  Sent: 12/21/2021 4:28 PM EST  Subject: Appointment Request    Appointment Request From: Melvi Ashton    With Provider: Irene Sutton MD [[facility]]    Preferred Date Range: 12/22/2021  12/22/2021    Preferred Times:  Wednesday Morning, Wednesday Afternoon    Reason for visit: Request an Appointment    Comments:  Popping in upper neck

## 2021-12-30 ENCOUNTER — TELEPHONE (OUTPATIENT)
Dept: INTERNAL MEDICINE CLINIC | Age: 56
End: 2021-12-30

## 2021-12-30 ENCOUNTER — VIRTUAL VISIT (OUTPATIENT)
Dept: INTERNAL MEDICINE CLINIC | Age: 56
End: 2021-12-30
Payer: OTHER GOVERNMENT

## 2021-12-30 DIAGNOSIS — M54.2 NECK PAIN: ICD-10-CM

## 2021-12-30 DIAGNOSIS — M25.512 ACUTE PAIN OF LEFT SHOULDER: Primary | ICD-10-CM

## 2021-12-30 PROCEDURE — 99442 PR PHYS/QHP TELEPHONE EVALUATION 11-20 MIN: CPT | Performed by: INTERNAL MEDICINE

## 2021-12-30 NOTE — TELEPHONE ENCOUNTER
Pt wants to know if her appt today could be changed to virtual?  I wasn't sure given the reason for the appt.   Please advise her at [phone]

## 2021-12-30 NOTE — PROGRESS NOTES
Mariam Obregon is a 61 y.o. female, evaluated via audio-only technology on 12/30/2021 for Neck Pain and Shoulder Pain (left)  . Assessment & Plan:   Diagnoses and all orders for this visit:    1. Acute pain of left shoulder    2. Neck pain        12  Subjective:   Patient who has a history of sleep apnea but uses oral device was advised to adjust her oral device because she was snoring more. Once she started to adjust the device she began to feel more popping in her neck. She has had previous neck surgery. She did not have any significant discomfort in the neck but she adjusted the oral appliance to its original position and noticed the popping in her neck to improve. Patient is aware that she will need to discuss with her sleep specialist about making a new oral device or going back to CPAP which would put less stress on her neck alignment. Patient is also having increased left shoulder pain which is worse with internal and external rotation consistent with rotator cuff pathology. Patient will try doing rotator cuff exercises at home and if not improving will refer for physical therapy. Prior to Admission medications    Medication Sig Start Date End Date Taking? Authorizing Provider   lisinopril-hydroCHLOROthiazide (PRINZIDE, ZESTORETIC) 20-12.5 mg per tablet TAKE 1 TABLET BY MOUTH TWICE A DAY 9/17/21   Jose Eduardo Ortega MD   fluticasone propionate (FLONASE) 50 mcg/actuation nasal spray 2 Sprays by Both Nostrils route daily.  8/4/21   Jose Eduardo Ortega MD   cloNIDine (CATAPRES) 0.1 mg/24 hr ptwk APPLY 1 PATCH TO SKIN AND CHANGE EVERY 7 DAYS 6/22/21   Jose Eduardo Ortega MD   simvastatin (ZOCOR) 20 mg tablet TAKE 1 TABLET BY MOUTH EVERYDAY AT BEDTIME  Patient taking differently: 40 mg. 5/30/21   Jose Eduardo Ortega MD   levalbuterol tartrate (XOPENEX) 45 mcg/actuation inhaler INHALE 1 TO 2 PUFFS BY MOUTH EVERY 4 TO 6 HOURS AS NEEDED 4/15/21   Jose Eduardo Ortega MD   sodium chloride (Saline Nasal Mist) 0.65 % nasal squeeze bottle 0.05 mL by Both Nostrils route as needed for Congestion. 5/20/20   Rosa Elena Panda DNP   omeprazole (PRILOSEC) 40 mg capsule Take 40 mg by mouth daily. Provider, Historical   cyclobenzaprine (FLEXERIL) 5 mg tablet TAKE 1-2 TABLETS BY MOUTH AT NIGHT. 9/7/17   Mei Ortega MD   YUVAFEM 10 mcg tab vaginal tablet INSERT 1 APPLICATORFUL VAGINALLY AT BEDTIME FOR 14 DAYS THEN TWICE PER WEEK 2/9/17   Provider, Historical   LACTOBACILLUS ACIDOPHILUS (PROBIOTIC PO) Take  by mouth. Other, MD Uriel   valACYclovir (VALTREX) 500 mg tablet Take 500 mg by mouth daily. 2/26/15   Provider, Historical   vitamin e 400 unit tab Take 1 Cap by mouth daily. Provider, Historical   Cholecalciferol, Vitamin D3, (VITAMIN D) 2,000 unit cap capsule Take 2,000 Units by mouth daily. 11/4/14   Marlin Valentine MD   cpap machine kit by Does Not Apply route. Replace mask, headgear, heated hose, filters and accessories as needed for one year.    CPAP pressure: 9 cwp  Mask: standard Mirage FX plus Opus nasal pillows mask, or mask of choice  [employer]: [employer]  Download data 30 days after initiation of therapy 1/25/13   Michelle Colon MD     Patient Active Problem List   Diagnosis Code    Plantar fasciitis M72.2    HTN (hypertension) I10    Hyperlipidemia E78.5    SUBHASH on CPAP G47.33, Z99.89    GERD (gastroesophageal reflux disease) K21.9    Undiagnosed cardiac murmurs R01.1    Obesity, unspecified E66.9    Cardiomegaly I51.7    Vitamin D deficiency E55.9       ROS    Patient-Reported Vitals 8/4/2021   Patient-Reported Weight unsure   Patient-Reported Height 55   Patient-Reported Pulse 70   Patient-Reported Temperature 97   Patient-Reported SpO2 99   Patient-Reported Systolic  654   Patient-Reported Diastolic 86   Patient-Reported Peak Flow N/A   Patient-Reported LMP N/A       Nishant Cano, who was evaluated through a patient-initiated, synchronous (real-time) audio only encounter, and/or her healthcare decision maker, is aware that it is a billable service, with coverage as determined by her insurance carrier. She provided verbal consent to proceed: Yes. She has not had a related appointment within my department in the past 7 days or scheduled within the next 24 hours. On this date 12/30/2021 I have spent 11minutes reviewing previous notes, test results and face to face (virtual) with the patient discussing the diagnosis and importance of compliance with the treatment plan as well as documenting on the day of the visit.     Tessa Mario MD

## 2022-02-02 ENCOUNTER — TELEPHONE (OUTPATIENT)
Dept: INTERNAL MEDICINE CLINIC | Age: 57
End: 2022-02-02

## 2022-02-02 NOTE — TELEPHONE ENCOUNTER
----- Message from Deya Carey sent at 2/2/2022 12:24 PM EST -----  Subject: Message to Provider    QUESTIONS  Information for Provider? Pt needs to be rescheduled for her lab work. She   is free 2/22 in the morning,  ---------------------------------------------------------------------------  --------------  CALL BACK INFO  What is the best way for the office to contact you? OK to leave message on   voicemail  Preferred Call Back Phone Number? [phone]  ---------------------------------------------------------------------------  --------------  SCRIPT ANSWERS  Relationship to Patient?  Self

## 2022-02-22 ENCOUNTER — APPOINTMENT (OUTPATIENT)
Dept: INTERNAL MEDICINE CLINIC | Age: 57
End: 2022-02-22

## 2022-02-28 ENCOUNTER — TELEPHONE (OUTPATIENT)
Dept: INTERNAL MEDICINE CLINIC | Age: 57
End: 2022-02-28

## 2022-02-28 NOTE — TELEPHONE ENCOUNTER
Patient wants to know if we have gotten her lab results. She came in and had them drawn on 2/22/22. Her appt with Dr. Marianna Mcgee is tomorrow.

## 2022-03-01 ENCOUNTER — OFFICE VISIT (OUTPATIENT)
Dept: INTERNAL MEDICINE CLINIC | Age: 57
End: 2022-03-01
Payer: OTHER GOVERNMENT

## 2022-03-01 VITALS
TEMPERATURE: 97.1 F | BODY MASS INDEX: 33.99 KG/M2 | HEART RATE: 68 BPM | OXYGEN SATURATION: 100 % | HEIGHT: 65 IN | DIASTOLIC BLOOD PRESSURE: 66 MMHG | WEIGHT: 204 LBS | SYSTOLIC BLOOD PRESSURE: 107 MMHG | RESPIRATION RATE: 16 BRPM

## 2022-03-01 DIAGNOSIS — M54.50 CHRONIC RIGHT-SIDED LOW BACK PAIN WITHOUT SCIATICA: ICD-10-CM

## 2022-03-01 DIAGNOSIS — I10 ESSENTIAL HYPERTENSION: Primary | ICD-10-CM

## 2022-03-01 DIAGNOSIS — E55.9 VITAMIN D DEFICIENCY: ICD-10-CM

## 2022-03-01 DIAGNOSIS — G89.29 CHRONIC RIGHT-SIDED LOW BACK PAIN WITHOUT SCIATICA: ICD-10-CM

## 2022-03-01 DIAGNOSIS — E78.00 PURE HYPERCHOLESTEROLEMIA: ICD-10-CM

## 2022-03-01 DIAGNOSIS — R73.03 PREDIABETES: ICD-10-CM

## 2022-03-01 DIAGNOSIS — M25.512 CHRONIC LEFT SHOULDER PAIN: ICD-10-CM

## 2022-03-01 DIAGNOSIS — S46.812A STRAIN OF LEFT TRAPEZIUS MUSCLE, INITIAL ENCOUNTER: ICD-10-CM

## 2022-03-01 DIAGNOSIS — G89.29 CHRONIC LEFT SHOULDER PAIN: ICD-10-CM

## 2022-03-01 DIAGNOSIS — J30.1 SEASONAL ALLERGIC RHINITIS DUE TO POLLEN: ICD-10-CM

## 2022-03-01 PROCEDURE — 99214 OFFICE O/P EST MOD 30 MIN: CPT | Performed by: INTERNAL MEDICINE

## 2022-03-01 RX ORDER — FLUTICASONE PROPIONATE 50 MCG
2 SPRAY, SUSPENSION (ML) NASAL DAILY
Qty: 1 EACH | Refills: 5 | Status: SHIPPED | OUTPATIENT
Start: 2022-03-01

## 2022-03-01 NOTE — PROGRESS NOTES
Subjective:       Chief Complaint  The patient presents for follow up of hypertension and high cholesterol. Vit D deficiency, prediabetes         HPI  Viktoria Betancur is a 61 y.o. female seen for follow up of hyperlipidemia. Sheraymundoo has hypertension. Hypertension  well controlled, no significant medication side effects noted, on catapres which is also for hot flashes and Zestoretic 20/12.5 BID,  hyperlipidemia borderline controlled on Zocor 20 mg. Has recently regained some weight. No significant medication side effects noted, on Zocor 20 mg. Pt continues to work on losing weight. She will continue to try to AIM for fasting 16 hours and eating more protein and  Fiber. She has mild prediabeties which she is trying to control with cutting back starches and sweets and losing weight. .  She was on Metformin at one time. .     Diet and Lifestyle: generally follows a low fat low cholesterol diet, does not rigorously follow a diabetic diet, exercises regularly    Home BP Monitoring: is not measured at home. Other symptoms and concerns: vit D level is fairly  well controlled on  vit D 5000 units/week    Patient has gastroesophageal reflux disease that is controlled on Prilosec 40 mg daily. Patient is having exacerbation of neck pain as well as low back pain and left shoulder pain. She did not have much improvement with home exercises therefore will refer to physical therapy and add dry needling. Current Outpatient Medications   Medication Sig    fluticasone propionate (FLONASE) 50 mcg/actuation nasal spray 2 Sprays by Both Nostrils route daily.     lisinopril-hydroCHLOROthiazide (PRINZIDE, ZESTORETIC) 20-12.5 mg per tablet TAKE 1 TABLET BY MOUTH TWICE A DAY    cloNIDine (CATAPRES) 0.1 mg/24 hr ptwk APPLY 1 PATCH TO SKIN AND CHANGE EVERY 7 DAYS    simvastatin (ZOCOR) 20 mg tablet TAKE 1 TABLET BY MOUTH EVERYDAY AT BEDTIME (Patient taking differently: 40 mg.)    levalbuterol tartrate (XOPENEX) 45 mcg/actuation inhaler INHALE 1 TO 2 PUFFS BY MOUTH EVERY 4 TO 6 HOURS AS NEEDED    sodium chloride (Saline Nasal Mist) 0.65 % nasal squeeze bottle 0.05 mL by Both Nostrils route as needed for Congestion.  omeprazole (PRILOSEC) 40 mg capsule Take 40 mg by mouth daily.  cyclobenzaprine (FLEXERIL) 5 mg tablet TAKE 1-2 TABLETS BY MOUTH AT NIGHT.  YUVAFEM 10 mcg tab vaginal tablet INSERT 1 APPLICATORFUL VAGINALLY AT BEDTIME FOR 14 DAYS THEN TWICE PER WEEK    LACTOBACILLUS ACIDOPHILUS (PROBIOTIC PO) Take  by mouth.  valACYclovir (VALTREX) 500 mg tablet Take 500 mg by mouth daily.  vitamin e 400 unit tab Take 1 Cap by mouth daily.  Cholecalciferol, Vitamin D3, (VITAMIN D) 2,000 unit cap capsule Take 2,000 Units by mouth daily.  cpap machine kit by Does Not Apply route. Replace mask, headgear, heated hose, filters and accessories as needed for one year. CPAP pressure: 9 cwp  Mask: standard Mirage FX plus Opus nasal pillows mask, or mask of choice  Home[facility] Company: [employer]  Download data 30 days after initiation of therapy     No current facility-administered medications for this visit.              Review of Systems  Respiratory: negative for dyspnea on exertion  Cardiovascular: negative for chest pain    Objective:     Visit Vitals  /66   Pulse 68   Temp 97.1 °F (36.2 °C) (Temporal)   Resp 16   Ht 5' 5\" (1.651 m)   Wt 204 lb (92.5 kg)   SpO2 100%   BMI 33.95 kg/m²        General appearance - alert, well appearing, and in no distress  Chest - clear to auscultation, no wheezes, rales or rhonchi, symmetric air entry  Heart - normal rate, regular rhythm, normal S1, S2, no murmurs, rubs, clicks or gallops  Extremities - peripheral pulses normal, no pedal edema, no clubbing or cyanosis  Skin - normal coloration and turgor, no rashes, no suspicious skin lesions noted      Labs:   Lab Results   Component Value Date/Time    Cholesterol, total 188 10/13/2021 01:42 PM    HDL Cholesterol 59 10/13/2021 01:42 PM    LDL-CHOLESTEROL 108 (H) 10/13/2021 01:42 PM    LDL, calculated 101 (H) 08/12/2015 09:00 AM    LDL-C, External 96 04/23/2015 12:00 AM    Triglyceride 113 10/13/2021 01:42 PM    Cholesterol/HDL ratio 3.2 10/13/2021 01:42 PM     Lab Results   Component Value Date/Time    Sodium 142 10/13/2021 01:42 PM    Potassium 3.6 10/13/2021 01:42 PM    Chloride 102 10/13/2021 01:42 PM    CO2 29 10/13/2021 01:42 PM    Anion gap 5 10/18/2016 05:28 PM    Glucose 81 10/13/2021 01:42 PM    BUN 12 10/13/2021 01:42 PM    Creatinine 0.87 10/13/2021 01:42 PM    BUN/Creatinine ratio NOT APPLICABLE 73/00/3343 14:31 PM    GFR est AA 86 10/13/2021 01:42 PM    GFR est non-AA 74 10/13/2021 01:42 PM    Calcium 9.9 10/13/2021 01:42 PM    Bilirubin, total 0.6 10/13/2021 01:42 PM    ALT (SGPT) 20 10/13/2021 01:42 PM    Alk. phosphatase 75 01/29/2020 09:48 AM    Protein, total 7.4 10/13/2021 01:42 PM    Albumin 4.4 10/13/2021 01:42 PM    Globulin 3.0 10/13/2021 01:42 PM    A-G Ratio 1.1 10/18/2016 05:28 PM        Labs:   Lab Results   Component Value Date/Time    WBC 3.3 (L) 07/18/2019 08:56 AM    HGB 13.5 07/18/2019 08:56 AM    HCT 40.8 07/18/2019 08:56 AM    PLATELET 758 96/80/2535 08:56 AM    MCV 93.2 07/18/2019 08:56 AM     Lab Results   Component Value Date/Time    Hemoglobin A1c 5.7 (H) 10/13/2021 01:42 PM    Hemoglobin A1c, External 5.8 11/11/2016 12:00 AM        Labs collected 2/22/2022 reviewed. Assessment / Plan     Hypertension well controlled, on current meds. Hyperlipidemia borderline controlled, on Zocor 20 mg. Will try to improve it further with diet and weight loss. ICD-10-CM ICD-9-CM    1. Essential hypertension  W57 215.6 METABOLIC PANEL, COMPREHENSIVE   2. Pure hypercholesterolemia  E78.00 272.0 LIPID PANEL   3. Prediabetes  R73.03 790.29  will try improved by cutting back starches and sweets in her diet HEMOGLOBIN A1C W/O EAG   4.  Vitamin D deficiency  E55.9 268.9  well-controlled on current supplementation VITAMIN D, 25 HYDROXY   5. Strain of left trapezius muscle, initial encounter  S46.812A 840.8 REFERRAL TO PHYSICAL THERAPY      REFERRAL TO PHYSICAL THERAPY   6. Chronic right-sided low back pain without sciatica  M54.50 724.2 REFERRAL TO PHYSICAL THERAPY    G89.29 338.29 REFERRAL TO PHYSICAL THERAPY   7. Chronic left shoulder pain  M25.512 719.41 REFERRAL TO PHYSICAL THERAPY    G89.29 338.29 REFERRAL TO PHYSICAL THERAPY   8. 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 Follow-up and Dispositions    · Return in about 3 months (around 6/1/2022) for labs 1 week before. Reviewed plan of care. Patient has provided input and agrees with goals.

## 2022-03-01 NOTE — PROGRESS NOTES
Hilary Lo presents today for   Chief Complaint   Patient presents with    Follow-up    Hypertension    Cholesterol Problem     1. \"Have you been to the ER, urgent care clinic since your last visit? Hospitalized since your last visit? \" no    2. \"Have you seen or consulted any other health care providers outside of the [address] [place] [address] since your last visit? \" no     3. For patients aged 39-70: Has the patient had a colonoscopy / FIT/ Cologuard? Yes - no Care Gap present      If the patient is female:    4. For patients aged 41-77: Has the patient had a mammogram within the past 2 years? Yes - no Care Gap present  See top three    5. For patients aged 21-65: Has the patient had a pap smear?  Yes - no Care Gap present

## 2022-03-08 ENCOUNTER — HOSPITAL ENCOUNTER (OUTPATIENT)
Dept: PHYSICAL THERAPY | Age: 57
Discharge: HOME | End: 2022-03-08
Attending: INTERNAL MEDICINE
Payer: COMMERCIAL

## 2022-03-08 PROCEDURE — 97162 PT EVAL MOD COMPLEX 30 MIN: CPT

## 2022-03-08 PROCEDURE — 97110 THERAPEUTIC EXERCISES: CPT

## 2022-03-08 PROCEDURE — 97530 THERAPEUTIC ACTIVITIES: CPT

## 2022-03-08 NOTE — PROGRESS NOTES
Request for use of Dry Needling/Intramuscular Manual Therapy  Patient: Misael Blakely     Referral Source: Donte Templeton MD  Diagnosis: Neck pain on left side [M54.2]  Left shoulder pain [M25.512]  Right low back pain [M54.50]      : 1965  Date of initial visit: 3/8/2022   Attended visits: 1  Missed Visits: 0    Based on findings from the physical therapy examination and evaluation, the evaluating therapist believes the patient, Misael Blakely  would benefit from including Dry Needling as part of the plan of care. Dry needling is a treatment technique utilized in conjunction with other PT interventions to inactivate myofascial trigger points and the pain and dysfunction they cause. Dry Needling is an advanced procedure that requires additional training including greater than 54 hours of intensive course work. Physical Therapists at [address] [place] [address] are trained and/or certified through [employer] for their education. PROCEDURE:   Solid filament sterile needle (typically 0.3mm/30 gauge) inserted into a trigger point   Repeated movements inactivate the trigger points, taking 30-60 seconds per site   Typically consists of 1 dry needling session per week and a possible second treatment including muscle re-education, flexibility, strengthening and other manual techniques to facilitate the benefits of dry needling     BENEFITS:   Inactivation of trigger points   Decreased pain   Increased muscle length   Improved movement patterns   Restoration of function POTENTIAL RISKS:   Post-needling soreness   Infection   Bruising/bleeding   Penetration of a nerve   Pneumothorax   All treating PTs have been thoroughly educated in avoiding adverse reactions    If you agree with this recommendation, please sign this form and fax it to us at [phone].   If you have questions or concerns regarding dry needling or any other treatment we may be providing, please contact us at [phone]. Thank you for allowing us to assist in the care of your patient. Deepthi Walls, PT, DPT    3/8/2022 12:27 PM     NOTE TO PHYSICIAN:  PLEASE COMPLETE THE ORDERS BELOW AND   FAX TO [facility]: [phone]  If you are unable to process this request in 24 hours please contact our office:   [phone]    I have read the above request and AGREE to the recommendation of including dry needling as part of the plan of care.       Physicians signature: _________________________Date: _________Time:________

## 2022-03-08 NOTE — PROGRESS NOTES
PT DAILY TREATMENT NOTE     Patient Name: Brittani Chand  Date:3/8/2022  : 1965  [x]  Patient  Verified  Payor: Bella Early / Plan: Tyrone Oconnor / Product Type: Commerical /    In time: 1030  Out time:1120  Total Treatment Time (min): 50  Visit #: 1 of 8    Treatment Area: Low back pain [M54.50]    SUBJECTIVE  Pain Level (0-10 scale): 7-8  Any medication changes, allergies to medications, adverse drug reactions, diagnosis change, or new procedure performed?: [x] No    [] Yes (see summary sheet for update)  Subjective functional status/changes:   [] No changes reported  Patient reports chronic neck and low back pain with exacerbation ~30-40 days ago. She reports no mechanism of injury and was encouraged to stretch and complete exercise. She reports no change in symptoms with these activities. She describes her right low back/hip pain as aching, \"sharp\" at times, and \"heat\" sensation that does not go past right buttocks. Her left neck/shoulder is described as a \"rope\" with tightness reported. She feels if her neck is loosened she will be able to move her left shoulder with greater ease. OBJECTIVE    23 min [x]Eval                  []Re-Eval       12 min Therapeutic Exercise:  [x] See flow sheet : Patient provided printed HEP to begin addressing impairments. Patient provided demonstration of exercises and rationale for each exercise to improve compliance to HEP. Education provided on importance of compliance to HEP for improved carry over between therapy session. Rationale: increase ROM, increase strength and improve coordination to improve the patients ability to perform ADLs and self care tasks with greater ease     15 min Therapeutic Activity:  [x]  See flow sheet : Patient education provided on pathology, findings, and treatment plan of care.     Rationale: increase strength and improve coordination  to improve the patients ability to perform functional tasks with improved ease             With   [] TE   [] TA   [] neuro   [] other: Patient Education: [x] Review HEP    [] Progressed/Changed HEP based on:   [] positioning   [] body mechanics   [] transfers   [] heat/ice application    [] other:      Other Objective/Functional Measures: see paper chart for evaluation form      Pain Level (0-10 scale) post treatment: 6-7    ASSESSMENT/Changes in Function: Patient is a 61year old female presenting with left neck/shoulder pain and right low back/hip pain. She is right hand dominant and denies limitations with daily/work activities prior to onset ~30-40 days ago. Patient denies mechanism of injury and reports \"rope-like\" tightness through left neck that limits shoulder mobility and aching/sharp pain intermittently to right low back and hip. She denies radicular symptoms, though does report some pain into right buttocks. She demonstrates the following deficits: decreased cervical rotation and SB, decreased left shoulder scaption AROM, decreased left shoulder strength, decreased right glute med and max strength, decreased posterior chain flexibility (left > right), and decreased core stabilization. She has significant tightness noted through left upper trap musculature and would likely benefit from dry needling to this area. Also note, moderate/severe TTP to right piriformis. Patient's signs and symptoms appear consistent with muscular strain to left upper trap and piriformis syndrome on right. She would benefit from skilled PT 2x/week for 4 weeks to address the above limitations and promote return to PLOF.       Patient will continue to benefit from skilled PT services to modify and progress therapeutic interventions, address functional mobility deficits, address ROM deficits, address strength deficits, analyze and address soft tissue restrictions, analyze and cue movement patterns, analyze and modify body mechanics/ergonomics, assess and modify postural abnormalities and instruct in home and community integration to attain remaining goals. [x]  See Plan of Care  []  See progress note/recertification  []  See Discharge Summary         Progress towards goals / Updated goals:  Short Term Goals: To be accomplished in 2 weeks:  1. Patient will report performance of home exercise program 4 of 7 days in the next week demonstrating compliance to therapy program and progress towards independent management of symptoms. Eval: HEP provided and instructed     Long Term Goals: To be accomplished in 4 weeks:  1. Patient will increase left shoulder scaption AROM to at least 130 degrees without increase in pain to promote improved ease with self care tasks. Eval: 105 degrees AROM, pain in left shoulder and neck  2. Patient will increase bilateral cervical SB to 30 degrees and left rotation to at least 60 degrees to improve ease with daily activities with greater ease. Eval: right SB 20 degrees, left SB 17 degrees, left rotation 30 degrees   3. Patient will increase right glute med and max strength to at least 5-/5 without increase in pain to promote improved ease with work activities. Eval: 4/5 glute med, 4-/5 glute max   4. Patient will report waking up </=2x/night due to pain to promote improved overall quality of life. Eval: 6-7x night due to pain in neck and back  5. Patient will increase FOTO Score to at least 58 to promote improved ease with household activities.     Eval: 44    PLAN  []  Upgrade activities as tolerated     [x]  Continue plan of care  []  Update interventions per flow sheet       []  Discharge due to:_  []  Other:_      Guerda Dobbs, PT, DPT 3/8/2022  11:12 AM    Future Appointments   Date Time Provider Chay Meng   5/25/2022 10:45 AM [facility] LAB VISIT IO BS AMB   5/31/2022 10:40 AM Isela Ortega MD IO BS AMB

## 2022-03-08 NOTE — PROGRESS NOTES
[facility]  [address]  [place], [address]  [phone] fax    Plan of Care/ Statement of Necessity for Physical Therapy Services    Patient name: Frank Vernon Start of Care: 3/8/2022   Referral source: Kimberly Warren MD : 1965    Medical Diagnosis: Low back pain [M54.50]  Payor: Barbara Lin / Plan: [place] / Product Type: Commerical /  Onset Date: ~30-40 days prior to [facility]    Treatment Diagnosis: neck pain, low back pain   Prior Hospitalization: see medical history Provider#: [id number]   Medications: Verified on Patient summary List    Comorbidities: allergies, back pain, BMI>30, HTN, C3-5 fusion (2018)   Prior Level of Function: right hand dominant, no limitations with daily/work tasks prior to onset 30-40 days ago     The Plan of Care and following information is based on the information from the initial evaluation. Assessment/ key information: Patient is a 61year old female presenting with left neck/shoulder pain and right low back/hip pain. She is right hand dominant and denies limitations with daily/work activities prior to onset ~30-40 days ago. Patient denies mechanism of injury and reports \"rope-like\" tightness through left neck that limits shoulder mobility and aching/sharp pain intermittently to right low back and hip. She denies radicular symptoms, though does report some pain into right buttocks. She demonstrates the following deficits: decreased cervical rotation and SB, decreased left shoulder scaption AROM, decreased left shoulder strength, decreased right glute med and max strength, decreased posterior chain flexibility (left > right), and decreased core stabilization. She has significant tightness noted through left upper trap musculature and would likely benefit from dry needling to this area. Also note, moderate/severe TTP over right piriformis.  Patient's signs and symptoms appear consistent with muscular strain to left upper trap and piriformis syndrome on right. She would benefit from skilled PT 2x/week for 4 weeks to address the above limitations and promote return to PLOF. Evaluation Complexity History HIGH Complexity :3+ comorbidities / personal factors will impact the outcome/ POC ; Examination MEDIUM Complexity : 3 Standardized tests and measures addressing body structure, function, activity limitation and / or participation in recreation  ;Presentation MEDIUM Complexity : Evolving with changing characteristics  ; Clinical Decision Making MEDIUM Complexity : FOTO score of 26-74  Overall Complexity Rating: MEDIUM  Problem List: pain affecting function, decrease ROM, decrease strength, decrease ADL/ functional abilitiies, decrease activity tolerance, decrease flexibility/ joint mobility and decrease transfer abilities   Treatment Plan may include any combination of the following: Therapeutic exercise, Therapeutic activities, Neuromuscular re-education, Physical agent/modality, Gait/balance training, Manual therapy, Patient education, Self Care training, Functional mobility training, Home safety training and Stair training **DRY NEEDLING**  Patient / Family readiness to learn indicated by: asking questions and trying to perform skills  Persons(s) to be included in education: patient (P)  Barriers to Learning/Limitations: None  Patient Goal (s): I want to be pain free  Patient Self Reported Health Status: good  Rehabilitation Potential: good    Short Term Goals: To be accomplished in 2 weeks:  1. Patient will report performance of home exercise program 4 of 7 days in the next week demonstrating compliance to therapy program and progress towards independent management of symptoms. Long Term Goals: To be accomplished in 4 weeks:  1. Patient will increase left shoulder scaption AROM to at least 130 degrees without increase in pain to promote improved ease with self care tasks.   2. Patient will increase bilateral cervical SB to 30 degrees and left rotation to at least 60 degrees to improve ease with daily activities with greater ease. 3. Patient will increase right glute med and max strength to at least 5-/5 without increase in pain to promote improved ease with work activities. 4. Patient will report waking up </=2x/night due to pain to promote improved overall quality of life. 5. Patient will increase FOTO Score to at least 58 to promote improved ease with household activities. Frequency / Duration: Patient to be seen 2 times per week for 4 weeks. Patient/ Caregiver education and instruction: Diagnosis, prognosis, activity modification and exercises   [x]  Plan of care has been reviewed with ANAYELI Anne PT, DPT 3/8/2022 11:12 AM    ________________________________________________________________________    I certify that the above Therapy Services are being furnished while the patient is under my care. I agree with the treatment plan and certify that this therapy is necessary.     [de-identified] Signature:____________Date:_________TIME:________     Rosa Elena Stiles MD  ** Signature, Date and Time must be completed for valid certification **    Please sign and return to [facility] [address] [place] [address] & [facility] [address], [facility] [address]  [phone] fax

## 2022-03-15 ENCOUNTER — APPOINTMENT (OUTPATIENT)
Dept: PHYSICAL THERAPY | Age: 57
End: 2022-03-15
Attending: INTERNAL MEDICINE
Payer: COMMERCIAL

## 2022-03-17 ENCOUNTER — HOSPITAL ENCOUNTER (OUTPATIENT)
Dept: PHYSICAL THERAPY | Age: 57
Discharge: HOME | End: 2022-03-17
Attending: INTERNAL MEDICINE
Payer: COMMERCIAL

## 2022-03-17 PROCEDURE — 20560 NDL INSJ W/O NJX 1 OR 2 MUSC: CPT

## 2022-03-17 PROCEDURE — 97112 NEUROMUSCULAR REEDUCATION: CPT

## 2022-03-17 PROCEDURE — 97140 MANUAL THERAPY 1/> REGIONS: CPT

## 2022-03-17 PROCEDURE — 97110 THERAPEUTIC EXERCISES: CPT

## 2022-03-17 NOTE — PROGRESS NOTES
PT DAILY TREATMENT NOTE     Patient Name: Simona Cancer  Date:3/17/2022  : 1965  [x]  Patient  Verified  Payor: Meg Lewis / Plan: Parth Kahn / Product Type: Commerical /    In BSML:[id number]  Out OMSJ:[id number]  Total Treatment Time (min): 62  Visit #: 2 of 8    Treatment Area: Neck pain on left side [M54.2]  Left shoulder pain [M25.512]  Right low back pain [M54.50]    SUBJECTIVE  Pain Level (0-10 scale): 7  Any medication changes, allergies to medications, adverse drug reactions, diagnosis change, or new procedure performed?: [x] No    [] Yes (see summary sheet for update)  Subjective functional status/changes:   [] No changes reported  The pt reports HEP compliance and feels it has been helping her mobility and flexibility but no change in pain.      OBJECTIVE    Modality rationale: decrease inflammation and decrease pain to improve the patients ability to perform ADL   Min Type Additional Details    [] Estim:  []Unatt       []IFC  []Premod                        []Other:  []w/ice   []w/heat  Position:  Location:    [] Estim: []Att    []TENS instruct  []NMES                    []Other:  []w/US   []w/ice   []w/heat  Position:  Location:    []  Traction: [] Cervical       []Lumbar                       [] Prone          []Supine                       []Intermittent   []Continuous Lbs:  [] before manual  [] after manual    []  Ultrasound: []Continuous   [] Pulsed                           []1MHz   []3MHz W/cm2:  Location:    []  Iontophoresis with dexamethasone         Location: [] Take home patch   [] In clinic   10 [x]  Ice     []  heat  []  Ice massage  []  Laser   []  Anodyne Position:seated  Location:lumbar and left UT    []  Laser with stim  []  Other:  Position:  Location:    []  Vasopneumatic Device    []  Right     []  Left  Pre-treatment girth:  Post-treatment girth:  Measured at (location):  Pressure:       [] lo [] med [] hi   Temperature: [] lo [] med [] hi   [] Skin assessment post-treatment: []intact []redness- no adverse reaction    []redness - adverse reaction:         10 min Therapeutic Exercise:  [x] See flow sheet :   Rationale: increase ROM and increase strength to improve the patients ability to perform ADL    24 min Neuromuscular Re-education:  [x]  See flow sheet :    Rationale: increase strength, improve coordination and increase proprioception  to improve the patients ability to perform ADl    15 min Manual Therapy:  IMDN to include education, assessment, set-up, palpation, hemostasis, STM/stretch to treated areas and reassessment only. The manual therapy interventions were performed at a separate and distinct time from the therapeutic activities interventions. Rationale: decrease pain, increase ROM, increase tissue extensibility and decrease trigger points to perform ADL    3 min Dry Needling:   [x]  CPT 63290:  needle insertion(s) without injection(s); 1 or 2 muscle(s)  []  CPT 81493:  needle insertion(s) without injection(s); 3 or more muscles. Rationale: decrease pain, increase ROM, increase tissue extensibility and decrease trigger points to perform daily activities    Dry Needling Procedure Note    Procedure: An intramuscular manual therapy (dry needling) and a neuro-muscular re-education treatment was done to deactivate myofascial trigger points with a 30 gauge filament needle under aseptic technique. Indications:  [x] Myofascial pain and dysfunction [] Muscled spasms  [x] Myalgia/myositis   [] Muscle cramps  [] Muscle imbalances  [] Temporomandibular Dysfunction  [] Other:    Chart reviewed for the following:  Ama MARK, PT, have reviewed the medical history, summary list and precautions/contraindications for [employer].   TIME OUT performed immediately prior to start of procedure:  Ama MARK PT, have performed the following reviews on [employer] prior to the start of the session:      [x] Verified patient identification by name and date of birth [x] Agreement on all muscles being treated was verified   [x] Purpose of dry needling, side effects, possible complications, risks and benefits were explained to the patient   [x] Procedure site(s) verified  [x] Patient was positioned for comfort and draped for privacy  [x] Informed Consent was signed (initial visit) and verified verbally (subsequent visits)  [x] Patient was instructed on the need to report the use of blood thinners and/or immunosuppressant medications  [x] How to respond to possible adverse effects of treatment  [x] Self treatment of post needling soreness: ice, heat (moist heat, heat wraps) and stretching  [x] Opportunity was given to ask any questions, all questions were answered            Time: 0823  Date of procedure: 3/17/2022  Treatment: The following muscles were treated today with intramuscular dry needling  [] Left [] Right Masster  [] Left [] Right Temporalis  [] Left [] Right Zygomaticus Major / Minor  [] Left [] Right Lateral Pterygoid  [] Left [] Right Medial Pterygoid  [] Left [] Right Digastric Post / Anterior Belly  [] Left [] Right Sternocleidomastoid  [] Left [] Right Scalene Anterior / Medial / Posterior  [] Left [] Right Extra Laryngeal Muscles  [x] Left [] Right Upper Trapezius  [] Left [] Right Middle Trapezius  [] Left [] Right Lower Trapezius  [] Left [] Right Oblique Capitis Inferior  [] Left [] Right Splenius Capitis / Cervicis  [] Left [] Right Semispinalis: Capitis / Cervicis  [] Left [] Right Multifidi / Rotatores Cervicis / Thoracic  [] Left [] Right Longissimus Thoracis / Illiocostalis  [] Left [] Right Levator Scapulae  [] Left [] Right Supraspinatus / Infraspinatus  [] Left [] Right Teres Major / Minor  [] Left [] Right Rhomboids / Serratus posterior superior  [] Left [] Right Pectoralis Major / Minor  [] Left [] Right Serratus Anterior  [] Left [] Right Latissimus Dorsi  [] Left [] Right Subscapularis  [] Left [] Right Coracobrachialis  [] Left [] Right Biceps Brachii  [] Left [] Right Deltoid: Anterior / Medial / Posterior  [] Left [] Right Brachialis  [] Left [] Right Triceps  [] Left [] Right Brachioradialis  [] Left [] Right Extensor Carpi Radialis Brevis / Extensor Carpi Radialis Longus    [] Left [] Right  Extensor digitorum  [] Left [] Right Supinator / Pronator Teres  [] Left [] Right Flexor Carpi Radialis/ Flexor Carpi Ulnaris   [] Left [] Right  Flexor Digitorum Superficialis/ Flexor Digitorum Profundus  [] Left [] Right Flexor Pollicis Longus / Flexor Pollicis Brevis / Palmaris Longus  [] Left [] Right Abductor Pollicis Longus / Abductor Pollicis Brevis  [] Left [] Right Opponens Pollicis / Adductor Pollicis  [] Left [] Right Dorsal / Palmar Interossei / Lumbricalis  [] Left [] Right Abductor Digiti Minimi / Opponens Digiti Minimi    Patient's response to today's treatment:  [x] Latent Twitch Response     [x] Muscle relaxation [x] Pain Relief  [] Post needling soreness    [x] without complications  [] Increased Range of Motion   [] Decreased headaches    [] Decreased Tinnitus  [] Other:     Performed by: Jayme Hong PT              With   [] TE   [] TA   [] neuro   [] other: Patient Education: [x] Review HEP    [] Progressed/Changed HEP based on:   [] positioning   [] body mechanics   [] transfers   [] heat/ice application    [x] other: post DN care     Other Objective/Functional Measures: initiated first f/u session     Pain Level (0-10 scale) post treatment: 5    ASSESSMENT/Changes in Function:  The pt demonstrated good response to DN treatment with improved mobility and diminished pain post session. + cues with therex at times to ensure proper form. Overall good response to therex as well.     Patient will continue to benefit from skilled PT services to modify and progress therapeutic interventions, address functional mobility deficits, address ROM deficits, address strength deficits, analyze and address soft tissue restrictions and analyze and cue movement patterns to attain remaining goals. []  See Plan of Care  []  See progress note/recertification  []  See Discharge Summary         Progress towards goals / Updated goals:  Short Term Goals: To be accomplished in 2 weeks:  1. Patient will report performance of home exercise program 4 of 7 days in the next week demonstrating compliance to therapy program and progress towards independent management of symptoms. Eval: HEP provided and instructed    Current: HEP compliance reported on 3-17-22     Long Term Goals: To be accomplished in 4 weeks:  1. Patient will increase left shoulder scaption AROM to at least 130 degrees without increase in pain to promote improved ease with self care tasks. Eval: 105 degrees AROM, pain in left shoulder and neck  2. Patient will increase bilateral cervical SB to 30 degrees and left rotation to at least 60 degrees to improve ease with daily activities with greater ease. Eval: right SB 20 degrees, left SB 17 degrees, left rotation 30 degrees   3. Patient will increase right glute med and max strength to at least 5-/5 without increase in pain to promote improved ease with work activities. Eval: 4/5 glute med, 4-/5 glute max   4. Patient will report waking up </=2x/night due to pain to promote improved overall quality of life. Eval: 6-7x night due to pain in neck and back  5. Patient will increase FOTO Score to at least 58 to promote improved ease with household activities.                Eval: 44    PLAN  []  Upgrade activities as tolerated     [x]  Continue plan of care  []  Update interventions per flow sheet       []  Discharge due to:_  []  Other:_      Henri Jovel, PT 3/17/2022  7:49 AM    Future Appointments   Date Time Provider Chay Meng   3/22/2022 10:45 AM Lesia Deleon PT [facility]   3/29/2022 10:00 AM Lesia Deleon PT [facility]   5/25/2022 10:45 AM [facility] VIS[facility] 5/31/2022 10:40 AM Calderon Ortega MD [facility]

## 2022-03-21 RX ORDER — SIMVASTATIN 20 MG/1
TABLET, FILM COATED ORAL
Qty: 90 TABLET | Refills: 2 | Status: SHIPPED | OUTPATIENT
Start: 2022-03-21

## 2022-03-22 ENCOUNTER — TRANSCRIBE ORDER (OUTPATIENT)
Dept: SCHEDULING | Age: 57
End: 2022-03-22

## 2022-03-22 ENCOUNTER — HOSPITAL ENCOUNTER (OUTPATIENT)
Dept: PHYSICAL THERAPY | Age: 57
Discharge: HOME | End: 2022-03-22
Attending: INTERNAL MEDICINE
Payer: COMMERCIAL

## 2022-03-22 DIAGNOSIS — N63.20 MASS OF LEFT BREAST: Primary | ICD-10-CM

## 2022-03-22 PROCEDURE — 97110 THERAPEUTIC EXERCISES: CPT

## 2022-03-22 PROCEDURE — 20560 NDL INSJ W/O NJX 1 OR 2 MUSC: CPT

## 2022-03-22 PROCEDURE — 97112 NEUROMUSCULAR REEDUCATION: CPT

## 2022-03-22 PROCEDURE — 97140 MANUAL THERAPY 1/> REGIONS: CPT

## 2022-03-22 NOTE — PROGRESS NOTES
PT DAILY TREATMENT NOTE     Patient Name: Jesica Wasserman  Date:3/22/2022  : 1965  [x]  Patient  Verified  Payor: Goldie Perez / Plan: Divina Salinas / Product Type: Commerical /    In time:1050  Out time:1145  Total Treatment Time (min): 55  Visit #: 2 of 8      Treatment Area: Neck pain on left side [M54.2]  Left shoulder pain [M25.512]  Right low back pain [M54.50]    SUBJECTIVE  Pain Level (0-10 scale): 7-8 left c/s, 4- L/S  Any medication changes, allergies to medications, adverse drug reactions, diagnosis change, or new procedure performed?: [x] No    [] Yes (see summary sheet for update)  Subjective functional status/changes:   [] No changes reported  The pt reports she had increased pain all over after last session but is feeling better overall. She reports improvement from DN.      OBJECTIVE    Modality rationale: decrease inflammation and decrease pain to improve the patients ability to perform ADL   Min Type Additional Details    [] Estim:  []Unatt       []IFC  []Premod                        []Other:  []w/ice   []w/heat  Position:  Location:    [] Estim: []Att    []TENS instruct  []NMES                    []Other:  []w/US   []w/ice   []w/heat  Position:  Location:    []  Traction: [] Cervical       []Lumbar                       [] Prone          []Supine                       []Intermittent   []Continuous Lbs:  [] before manual  [] after manual    []  Ultrasound: []Continuous   [] Pulsed                           []1MHz   []3MHz W/cm2:  Location:    []  Iontophoresis with dexamethasone         Location: [] Take home patch   [] In clinic   5 [x]  Ice     []  heat  []  Ice massage  []  Laser   []  Anodyne Position:seated  Location:lumbar, UT    []  Laser with stim  []  Other:  Position:  Location:    []  Vasopneumatic Device    []  Right     []  Left  Pre-treatment girth:  Post-treatment girth:  Measured at (location):  Pressure:       [] lo [] med [] hi   Temperature: [] lo [] med [] hi   [] Skin assessment post-treatment:  []intact []redness- no adverse reaction    []redness - adverse reaction:       10 min Therapeutic Exercise:  [x] See flow sheet :   Rationale: increase ROM and increase strength to improve the patients ability to perform ADL    23 min Neuromuscular Re-education:  [x]  See flow sheet Core Align, [place] series, t-band bridge   Rationale: increase strength, improve coordination and increase proprioception  to improve the patients ability to perform ADL    12 min Manual Therapy:  IMDN to include education, assessment, set-up, palpation, hemostasis, STM/stretch to treated areas and reassessment only     The manual therapy interventions were performed at a separate and distinct time from the therapeutic activities interventions. Rationale: decrease pain, increase ROM, increase tissue extensibility and decrease trigger points to perform ADL    5 min Dry Needling:   [x]  CPT 71577:  needle insertion(s) without injection(s); 1 or 2 muscle(s)  []  CPT 52943:  needle insertion(s) without injection(s); 3 or more muscles. Rationale: decrease pain, increase ROM, increase tissue extensibility and decrease trigger points to perform functional tasks. Dry Needling Procedure Note    Procedure: An intramuscular manual therapy (dry needling) and a neuro-muscular re-education treatment was done to deactivate myofascial trigger points with a 30 gauge filament needle under aseptic technique. Indications:  [x] Myofascial pain and dysfunction [] Muscled spasms  [x] Myalgia/myositis   [] Muscle cramps  [x] Muscle imbalances  [] Temporomandibular Dysfunction  [] Other:    Chart reviewed for the following:  Cecilia MARK PT, have reviewed the medical history, summary list and precautions/contraindications for [employer].   TIME OUT performed immediately prior to start of procedure:  Cecilia MARK PT, have performed the following reviews on [employer] prior to the start of the session: [x] Verified patient identification by name and date of birth    [x] Agreement on all muscles being treated was verified   [x] Purpose of dry needling, side effects, possible complications, risks and benefits were explained to the patient   [x] Procedure site(s) verified  [x] Patient was positioned for comfort and draped for privacy  [x] Informed Consent was signed (initial visit) and verified verbally (subsequent visits)  [x] Patient was instructed on the need to report the use of blood thinners and/or immunosuppressant medications  [x] How to respond to possible adverse effects of treatment  [x] Self treatment of post needling soreness: ice, heat (moist heat, heat wraps) and stretching  [x] Opportunity was given to ask any questions, all questions were answered            Time: 1125  Date of procedure: 3/22/2022  Treatment: The following muscles were treated today with intramuscular dry needling  [] Left [] Right Masster  [] Left [] Right Temporalis  [] Left [] Right Zygomaticus Major / Minor  [] Left [] Right Lateral Pterygoid  [] Left [] Right Medial Pterygoid  [] Left [] Right Digastric Post / Anterior Belly  [] Left [] Right Sternocleidomastoid  [] Left [] Right Scalene Anterior / Medial / Posterior  [] Left [] Right Extra Laryngeal Muscles  [x] Left [] Right Upper Trapezius  [] Left [] Right Middle Trapezius  [] Left [] Right Lower Trapezius  [] Left [] Right Oblique Capitis Inferior  [] Left [] Right Splenius Capitis / Cervicis  [] Left [] Right Semispinalis: Capitis / Cervicis  [] Left [] Right Multifidi / Rotatores Cervicis / Thoracic  [] Left [] Right Longissimus Thoracis / Illiocostalis  [] Left [] Right Levator Scapulae  [] Left [] Right Supraspinatus / Infraspinatus  [] Left [] Right Teres Major / Minor  [] Left [] Right Rhomboids / Serratus posterior superior  [] Left [] Right Pectoralis Major / Minor  [] Left [] Right Serratus Anterior  [] Left [] Right Latissimus Dorsi  [] Left [] Right Subscapularis  [] Left [] Right Coracobrachialis  [] Left [] Right Biceps Brachii  [] Left [] Right Deltoid: Anterior / Medial / Posterior  [] Left [] Right Brachialis  [] Left [] Right Triceps  [] Left [] Right Brachioradialis  [] Left [] Right Extensor Carpi Radialis Brevis / Extensor Carpi Radialis Longus    [] Left [] Right  Extensor digitorum  [] Left [] Right Supinator / Pronator Teres  [] Left [] Right Flexor Carpi Radialis/ Flexor Carpi Ulnaris   [] Left [] Right  Flexor Digitorum Superficialis/ Flexor Digitorum Profundus  [] Left [] Right Flexor Pollicis Longus / Flexor Pollicis Brevis / Palmaris Longus  [] Left [] Right Abductor Pollicis Longus / Abductor Pollicis Brevis  [] Left [] Right Opponens Pollicis / Adductor Pollicis  [] Left [] Right Dorsal / Palmar Interossei / Lumbricalis  [] Left [] Right Abductor Digiti Minimi / Opponens Digiti Minimi    Patient's response to today's treatment:  [x] Latent Twitch Response     [x] Muscle relaxation [x] Pain Relief  [] Post needling soreness    [x] without complications  [] Increased Range of Motion   [] Decreased headaches    [] Decreased Tinnitus  [] Other:     Performed by: Stewart Johnston, PT                With   [] TE   [] TA   [] neuro   [] other: Patient Education: [x] Review HEP    [] Progressed/Changed HEP based on:   [] positioning   [] body mechanics   [] transfers   [x] heat/ice application    [] other:      Other Objective/Functional Measures: added PPT with march and Core Align hip abd     Pain Level (0-10 scale) post treatment: 5    ASSESSMENT/Changes in Function: The pt with good response to DN today. Notable improvement in myofascial tightness post DN of the left UT region. Pt instructed in possible post needle soreness.      Patient will continue to benefit from skilled PT services to modify and progress therapeutic interventions, address functional mobility deficits, address ROM deficits, address strength deficits, analyze and address soft tissue restrictions, analyze and cue movement patterns and assess and modify postural abnormalities to attain remaining goals. []  See Plan of Care  []  See progress note/recertification  []  See Discharge Summary         Progress towards goals / Updated goals:  Short Term Goals: To be accomplished in 2 weeks:  1. Patient will report performance of home exercise program 4 of 7 days in the next week demonstrating compliance to therapy program and progress towards independent management of symptoms.             Eval: HEP provided and instructed               Current: HEP compliance reported on 3-17-22     Long Term Goals: To be accomplished in 4 weeks:  1. Patient will increase left shoulder scaption AROM to at least 130 degrees without increase in pain to promote improved ease with self care tasks.              Eval: 105 degrees AROM, pain in left shoulder and neck   Current: slow improvement 3-22-22  2. Patient will increase bilateral cervical SB to 30 degrees and left rotation to at least 60 degrees to improve ease with daily activities with greater ease.              Eval: right SB 20 degrees, left SB 17 degrees, left rotation 30 degrees   3. Patient will increase right glute med and max strength to at least 5-/5 without increase in pain to promote improved ease with work activities.                Eval: 4/5 glute med, 4-/5 glute max   4. Patient will report waking up </=2x/night due to pain to promote improved overall quality of life.               Eval: 6-7x night due to pain in neck and back  5. Patient will increase FOTO Score to at least 58 to promote improved ease with household activities.                Eval: 40       PLAN  []  Upgrade activities as tolerated     [x]  Continue plan of care  []  Update interventions per flow sheet       []  Discharge due to:_  []  Other:_      Isabel Smoker, PT 3/22/2022  10:54 AM    Future Appointments   Date Time Provider Chay Meng   3/29/2022 10:00 AM Anisha Gentile, PT [facility]   5/25/2022 10:45 AM [facility] LAB VISIT [facility]   5/31/2022 10:40 AM Genaro Ortega MD [facility] AM[facility]

## 2022-03-29 ENCOUNTER — APPOINTMENT (OUTPATIENT)
Dept: PHYSICAL THERAPY | Age: 57
End: 2022-03-29
Attending: INTERNAL MEDICINE
Payer: COMMERCIAL

## 2022-03-30 ENCOUNTER — HOSPITAL ENCOUNTER (OUTPATIENT)
Dept: ULTRASOUND IMAGING | Age: 57
Discharge: HOME | End: 2022-03-30
Attending: NURSE PRACTITIONER
Payer: OTHER GOVERNMENT

## 2022-03-30 ENCOUNTER — HOSPITAL ENCOUNTER (OUTPATIENT)
Dept: MAMMOGRAPHY | Age: 57
Discharge: HOME | End: 2022-03-30
Attending: NURSE PRACTITIONER
Payer: OTHER GOVERNMENT

## 2022-03-30 DIAGNOSIS — N63.20 MASS OF LEFT BREAST: ICD-10-CM

## 2022-03-30 PROCEDURE — 77061 BREAST TOMOSYNTHESIS UNI: CPT

## 2022-03-30 PROCEDURE — 76642 ULTRASOUND BREAST LIMITED: CPT

## 2022-04-05 ENCOUNTER — HOSPITAL ENCOUNTER (OUTPATIENT)
Dept: PHYSICAL THERAPY | Age: 57
Discharge: HOME | End: 2022-04-05
Attending: INTERNAL MEDICINE
Payer: COMMERCIAL

## 2022-04-05 PROCEDURE — 20560 NDL INSJ W/O NJX 1 OR 2 MUSC: CPT

## 2022-04-05 PROCEDURE — 97112 NEUROMUSCULAR REEDUCATION: CPT

## 2022-04-05 PROCEDURE — 97110 THERAPEUTIC EXERCISES: CPT

## 2022-04-05 PROCEDURE — 97140 MANUAL THERAPY 1/> REGIONS: CPT

## 2022-04-05 NOTE — PROGRESS NOTES
[facility]  [address]  [place], [address]  [phone] fax    Physical Therapy Progress Note  Patient name: Camilla Del Cid Start of Care: 3/8/2022   Referral source: Xander Garcia MD : 1965                Medical Diagnosis: Low back pain [M54.50]  Payor: Gil Machado / Plan: Dolly Alberto / Product Type: Commerical /  Onset Date: ~30-40 days prior to [facility]    Treatment Diagnosis: neck pain, low back pain   Prior Hospitalization: see medical history Provider#: [id number]   Medications: Verified on Patient summary List    Comorbidities: allergies, back pain, BMI>30, HTN, C3-5 fusion (2018)   Prior Level of Function: right hand dominant, no limitations with daily/work tasks prior to onset 30-40 days ago    Visits from Start of Care: 4    Missed Visits: 3        Key Functional Changes: The pt is progressing with c/s AROM and has met LTG 1 for AROM shoulder elevation. C/S pain has diminished and trigger points have improved in the UT region. Progress is slower with lumbar region. Progress towards goals:  Short Term Goals: To be accomplished in 2 weeks:  1. Patient will report performance of home exercise program 4 of 7 days in the next week demonstrating compliance to therapy program and progress towards independent management of symptoms.             Eval: HEP provided and instructed               LLGLXCH: HEP compliance reported on 3-17-22     Long Term Goals: To be accomplished in 4 weeks:  1. Patient will increase left shoulder scaption AROM to at least 130 degrees without increase in pain to promote improved ease with self care tasks.              Eval: 105 degrees AROM, pain in left shoulder and neck              Current: MET full AROM on 22  2.  Patient will increase bilateral cervical SB to 30 degrees and left rotation to at least 60 degrees to improve ease with daily activities with greater ease.              Eval: right SB 20 degrees, left SB 17 degrees, left rotation 30 degrees    Current: right SB 22 degrees, left 20 degrees,  Left rotation 60 degrees  3. Patient will increase right glute med and max strength to at least 5-/5 without increase in pain to promote improved ease with work activities.                Eval: 4/5 glute med, 4-/5 glute max    Current: 4/5 glute med, 4-/5 max  4. Patient will report waking up </=2x/night due to pain to promote improved overall quality of life.               Eval: 6-7x night due to pain in neck and back  5. Patient will increase FOTO Score to at least 58 to promote improved ease with household activities.                Eval: 44    Updated Goals: to be achieved in 3-4 weeks:   1. Patient will increase bilateral cervical SB to 30 degrees and left rotation to at least 60 degrees to improve ease with daily activities with greater ease.              PN: right SB 22 degrees, left 20 degrees,  Left rotation 60 degrees  2. Patient will increase right glute med and max strength to at least 5-/5 without increase in pain to promote improved ease with work activities.                PNl: 4/5 glute med, 4-/5 glute max   3. Patient will report waking up </=2x/night due to pain to promote improved overall quality of life.               PN: improving  4. Patient will increase FOTO Score to at least 58 to promote improved ease with household activities.                PN: 44    ASSESSMENT/RECOMMENDATIONS:  []Continue therapy per initial plan/protocol at a frequency of  1-2 x per week for 3-4 weeks  []Continue therapy with the following recommended changes:_____________________      _____________________________________________________________________  []Discontinue therapy progressing towards or have reached established goals  []Discontinue therapy due to lack of appreciable progress towards goals  []Discontinue therapy due to lack of attendance or compliance  []Await Physician's recommendations/decisions regarding therapy  []Other:________________________________________________________________    Thank you for this referral.    Kandace Renetta, PT 4/5/2022 1:55 PM  NOTE TO PHYSICIAN:  PLEASE COMPLETE THE ORDERS BELOW AND   FAX TO [facility]: [phone]  If you are unable to process this request in 24 hours please contact our office: [phone]    ? I have read the above report and request that my patient continue as recommended. ? I have read the above report and request that my patient continue therapy with the following changes/special instructions:__________________________________________________________  ? I have read the above report and request that my patient be discharged from therapy.     Physicians signature: ______________________________Date: ______Time:______     Tyler Rowe MD

## 2022-04-05 NOTE — PROGRESS NOTES
PT DAILY TREATMENT NOTE     Patient Name: Anahy Nobles  Date:2022  : 1965  [x]  Patient  Verified  Payor: Fadumo Whelan / Plan: Erich Dodge / Product Type: Commerical /    In time:1:00  Out time:1:50  Total Treatment Time (min): 50  Visit #: 4 of 8      Treatment Area: Neck pain on left side [M54.2]  Left shoulder pain [M25.512]  Right low back pain [M54.50]    SUBJECTIVE  Pain Level (0-10 scale): right lumbar/glute 5-6, c/s 2  Any medication changes, allergies to medications, adverse drug reactions, diagnosis change, or new procedure performed?: [x] No    [] Yes (see summary sheet for update)  Subjective functional status/changes:   [] No changes reported  The pt reports improvement in the c/s area but pain in her back and hip area are more of a problem.     OBJECTIVE    Modality rationale: decrease inflammation and decrease pain to improve the patients ability to perform ADL   Min Type Additional Details    [] Estim:  []Unatt       []IFC  []Premod                        []Other:  []w/ice   []w/heat  Position:  Location:    [] Estim: []Att    []TENS instruct  []NMES                    []Other:  []w/US   []w/ice   []w/heat  Position:  Location:    []  Traction: [] Cervical       []Lumbar                       [] Prone          []Supine                       []Intermittent   []Continuous Lbs:  [] before manual  [] after manual    []  Ultrasound: []Continuous   [] Pulsed                           []1MHz   []3MHz W/cm2:  Location:    []  Iontophoresis with dexamethasone         Location: [] Take home patch   [] In clinic   10 [x]  Ice     []  heat  []  Ice massage  []  Laser   []  Anodyne Position:seated  Location:lumbar and left UT    []  Laser with stim  []  Other:  Position:  Location:    []  Vasopneumatic Device    []  Right     []  Left  Pre-treatment girth:  Post-treatment girth:  Measured at (location):  Pressure:       [] lo [] med [] hi   Temperature: [] lo [] med [] hi   [] Skin assessment post-treatment:  []intact []redness- no adverse reaction    []redness - adverse reaction:     10 min Therapeutic Exercise:  [x] See flow sheet :   Rationale: increase ROM and increase strength to improve the patients ability to perform ADL    17 min Neuromuscular Re-education:  [x]  See flow sheet :   Rationale: increase strength, improve coordination and increase proprioception  to improve the patients ability to perform ADL    10 min Manual Therapy:  IMDN to include education, assessment, set-up, palpation, hemostasis, STM/stretch to treated areas and reassessment only     The manual therapy interventions were performed at a separate and distinct time from the therapeutic activities interventions. Rationale: decrease pain, increase ROM, increase tissue extensibility and decrease trigger points to perform ADL  3 min Dry Needling:   [x]  CPT 32705:  needle insertion(s) without injection(s); 1 or 2 muscle(s)  []  CPT 84205:  needle insertion(s) without injection(s); 3 or more muscles. Rationale: decrease pain, increase ROM, increase tissue extensibility and decrease trigger points to perform ADL    Dry Needling Procedure Note    Procedure: An intramuscular manual therapy (dry needling) and a neuro-muscular re-education treatment was done to deactivate myofascial trigger points with a 30 gauge filament needle under aseptic technique. Indications:  [x] Myofascial pain and dysfunction [] Muscled spasms  [x] Myalgia/myositis   [] Muscle cramps  [] Muscle imbalances  [] Temporomandibular Dysfunction  [] Other:    Chart reviewed for the following:  IJocelin PT, have reviewed the medical history, summary list and precautions/contraindications for [employer].   TIME OUT performed immediately prior to start of procedure:  Jocelin MARK PT, have performed the following reviews on [employer] prior to the start of the session:      [x] Verified patient identification by name and date of birth [x] Agreement on all muscles being treated was verified   [x] Purpose of dry needling, side effects, possible complications, risks and benefits were explained to the patient   [x] Procedure site(s) verified  [x] Patient was positioned for comfort and draped for privacy  [x] Informed Consent was signed (initial visit) and verified verbally (subsequent visits)  [x] Patient was instructed on the need to report the use of blood thinners and/or immunosuppressant medications  [x] How to respond to possible adverse effects of treatment  [x] Self treatment of post needling soreness: ice, heat (moist heat, heat wraps) and stretching  [x] Opportunity was given to ask any questions, all questions were answered            Time: 1:20  Date of procedure: 4/5/2022  Treatment: The following muscles were treated today with intramuscular dry needling  [] Left [] Right Masster  [] Left [] Right Temporalis  [] Left [] Right Zygomaticus Major / Minor  [] Left [] Right Lateral Pterygoid  [] Left [] Right Medial Pterygoid  [] Left [] Right Digastric Post / Anterior Belly  [] Left [] Right Sternocleidomastoid  [] Left [] Right Scalene Anterior / Medial / Posterior  [] Left [] Right Extra Laryngeal Muscles  [x] Left [] Right Upper Trapezius  [] Left [] Right Middle Trapezius  [] Left [] Right Lower Trapezius  [] Left [] Right Oblique Capitis Inferior   [] Left [] Right Splenius Capitis / Cervicis  [] Left [] Right Semispinalis: Capitis / Cervicis  [] Left [x] Right Multifidi lumbar  [] Left [] Right Longissimus Thoracis / Illiocostalis  [] Left [] Right Levator Scapulae  [] Left [] Right Supraspinatus / Infraspinatus  [] Left [] Right Teres Major / Minor  [] Left [] Right Rhomboids / Serratus posterior superior  [] Left [] Right Pectoralis Major / Minor  [] Left [] Right Serratus Anterior  [] Left [] Right Latissimus Dorsi  [] Left [] Right Subscapularis  [] Left [] Right Coracobrachialis  [] Left [] Right Biceps Brachii  [] Left [] Right Deltoid: Anterior / Medial / Posterior  [] Left [] Right Brachialis  [] Left [] Right Triceps  [] Left [] Right Brachioradialis  [] Left [] Right Extensor Carpi Radialis Brevis / Extensor Carpi Radialis Longus    [] Left [] Right  Extensor digitorum  [] Left [] Right Supinator / Pronator Teres  [] Left [] Right Flexor Carpi Radialis/ Flexor Carpi Ulnaris   [] Left [] Right  Flexor Digitorum Superficialis/ Flexor Digitorum Profundus  [] Left [] Right Flexor Pollicis Longus / Flexor Pollicis Brevis / Palmaris Longus  [] Left [] Right Abductor Pollicis Longus / Abductor Pollicis Brevis  [] Left [] Right Opponens Pollicis / Adductor Pollicis  [] Left [] Right Dorsal / Palmar Interossei / Lumbricalis  [] Left [] Right Abductor Digiti Minimi / Opponens Digiti Minimi    Patient's response to today's treatment:  [x] Latent Twitch Response     [x] Muscle relaxation [] Pain Relief  [] Post needling soreness    [x] without complications  [] Increased Range of Motion   [] Decreased headaches    [] Decreased Tinnitus  [] Other:     Performed by: Thom Gonzalez PT            With   [] TE   [] TA   [] neuro   [] other: Patient Education: [x] Review HEP    [] Progressed/Changed HEP based on:   [] positioning   [] body mechanics   [] transfers   [] heat/ice application    [] other:      Other Objective/Functional Measures:       Pain Level (0-10 scale) post treatment: 0    ASSESSMENT/Changes in Function: The pt is progressing with c/s AROM and has met LTG 1 for AROM shoulder elevation. C/S pain has diminished and trigger points have improved in the UT region. Progress is slower with lumbar region. The pt gained good relief post DN today.      Patient will continue to benefit from skilled PT services to modify and progress therapeutic interventions, address functional mobility deficits, address ROM deficits, address strength deficits, analyze and address soft tissue restrictions and analyze and cue movement patterns to attain remaining goals. []  See Plan of Care  [x]  See progress note/recertification  []  See Discharge Summary         Progress towards goals / Updated goals:  Short Term Goals: To be accomplished in 2 weeks:  1. Patient will report performance of home exercise program 4 of 7 days in the next week demonstrating compliance to therapy program and progress towards independent management of symptoms.             Eval: HEP provided and instructed               CALEBN: HEP compliance reported on 3-17-22     Long Term Goals: To be accomplished in 4 weeks:  1. Patient will increase left shoulder scaption AROM to at least 130 degrees without increase in pain to promote improved ease with self care tasks.              Eval: 105 degrees AROM, pain in left shoulder and neck              Current: MET full AROM on 4-5-22  2. Patient will increase bilateral cervical SB to 30 degrees and left rotation to at least 60 degrees to improve ease with daily activities with greater ease.              Eval: right SB 20 degrees, left SB 17 degrees, left rotation 30 degrees    Current: right SB 22 degrees, left 20 degrees,  Left rotation 60 degrees  3. Patient will increase right glute med and max strength to at least 5-/5 without increase in pain to promote improved ease with work activities.                Eval: 4/5 glute med, 4-/5 glute max    Current: 4/5 glute med, 4-/5 max  4. Patient will report waking up </=2x/night due to pain to promote improved overall quality of life.               Eval: 6-7x night due to pain in neck and back  5. Patient will increase FOTO Score to at least 58 to promote improved ease with household activities.                Eval: 40       PLAN  []  Upgrade activities as tolerated     [x]  Continue plan of care  []  Update interventions per flow sheet       []  Discharge due to:_  []  Other:_      David Shafer, PT 4/5/2022  1:10 PM    Future Appointments   Date Time Provider Chay Meng   5/25/2022 10:45 AM [facility] LAB VISIT [facility] BS AMB   5/31/2022 10:40 AM Annabel Ortega MD [facility] BS AMB

## 2022-04-22 ENCOUNTER — HOSPITAL ENCOUNTER (OUTPATIENT)
Dept: PHYSICAL THERAPY | Age: 57
Discharge: HOME | End: 2022-04-22
Attending: INTERNAL MEDICINE
Payer: COMMERCIAL

## 2022-04-22 PROCEDURE — 97110 THERAPEUTIC EXERCISES: CPT

## 2022-04-22 PROCEDURE — 20560 NDL INSJ W/O NJX 1 OR 2 MUSC: CPT

## 2022-04-22 PROCEDURE — 97112 NEUROMUSCULAR REEDUCATION: CPT

## 2022-04-22 PROCEDURE — 97140 MANUAL THERAPY 1/> REGIONS: CPT

## 2022-04-22 NOTE — PROGRESS NOTES
PT DAILY TREATMENT NOTE     Patient Name: Berlin Lyons  Date:2022  : 1965  [x]  Patient  Verified  Payor: Jovi Read / Plan: Cristy Zamudio / Product Type: Commerical /    In time:0700  Out time:0758  Total Treatment Time (min): 58  Visit #: 1 of 2-3    Treatment Area: Neck pain on left side [M54.2]  Left shoulder pain [M25.512]  Right low back pain [M54.50]    SUBJECTIVE  Pain Level (0-10 scale): 6- left shoulder, 4 lumbar  Any medication changes, allergies to medications, adverse drug reactions, diagnosis change, or new procedure performed?: [x] No    [] Yes (see summary sheet for update)  Subjective functional status/changes:   [] No changes reported  The pt reports she is doing better overall. She feels some increase in pain recently due to performing increase exercise program at home.      OBJECTIVE    Modality rationale: decrease inflammation and decrease pain to improve the patients ability to perform ADL   Min Type Additional Details    [] Estim:  []Unatt       []IFC  []Premod                        []Other:  []w/ice   []w/heat  Position:  Location:    [] Estim: []Att    []TENS instruct  []NMES                    []Other:  []w/US   []w/ice   []w/heat  Position:  Location:    []  Traction: [] Cervical       []Lumbar                       [] Prone          []Supine                       []Intermittent   []Continuous Lbs:  [] before manual  [] after manual    []  Ultrasound: []Continuous   [] Pulsed                           []1MHz   []3MHz W/cm2:  Location:    []  Iontophoresis with dexamethasone         Location: [] Take home patch   [] In clinic   10 [x]  Ice     []  heat  []  Ice massage  []  Laser   []  Anodyne Position:seated  Location: left UT, lumbar    []  Laser with stim  []  Other:  Position:  Location:    []  Vasopneumatic Device    []  Right     []  Left  Pre-treatment girth:  Post-treatment girth:  Measured at (location):  Pressure:       [] lo [] med [] hi   Temperature: [] lo [] med [] hi   [] Skin assessment post-treatment:  []intact []redness- no adverse reaction    []redness - adverse reaction:         14 min Therapeutic Exercise:  [x] See flow sheet :   Rationale: increase ROM and increase strength to improve the patients ability to perform ADL       15 min Neuromuscular Re-education:  [x]  See flow sheet :   Rationale: increase strength, improve coordination and increase proprioception  to improve the patients ability to perform functional tasks. 15 min Manual Therapy:  IMDN to include education, assessment, set-up, palpation, hemostasis, STM/stretch to treated areas and reassessment only     The manual therapy interventions were performed at a separate and distinct time from the therapeutic activities interventions. Rationale: decrease pain, increase ROM, increase tissue extensibility and decrease trigger points to perform ADL    4 min Dry Needling:   [x]  CPT 18652:  needle insertion(s) without injection(s); 1 or 2 muscle(s)  []  CPT 59603:  needle insertion(s) without injection(s); 3 or more muscles. Rationale: decrease pain, increase ROM, increase tissue extensibility and decrease trigger points to perform ADL    Dry Needling Procedure Note    Procedure: An intramuscular manual therapy (dry needling) and a neuro-muscular re-education treatment was done to deactivate myofascial trigger points with a 30 gauge filament needle under aseptic technique. Indications:  [x] Myofascial pain and dysfunction [] Muscled spasms  [] Myalgia/myositis   [] Muscle cramps  [x] Muscle imbalances  [] Temporomandibular Dysfunction  [] Other:    Chart reviewed for the following:  Danelle MARK PT, have reviewed the medical history, summary list and precautions/contraindications for [employer].   TIME OUT performed immediately prior to start of procedure:  Danelle MARK PT, have performed the following reviews on [employer] prior to the start of the session:      [x] Verified patient identification by name and date of birth    [x] Agreement on all muscles being treated was verified   [x] Purpose of dry needling, side effects, possible complications, risks and benefits were explained to the patient   [x] Procedure site(s) verified  [x] Patient was positioned for comfort and draped for privacy  [x] Informed Consent was signed (initial visit) and verified verbally (subsequent visits)  [x] Patient was instructed on the need to report the use of blood thinners and/or immunosuppressant medications  [x] How to respond to possible adverse effects of treatment  [x] Self treatment of post needling soreness: ice, heat (moist heat, heat wraps) and stretching  [x] Opportunity was given to ask any questions, all questions were answered            Time: 0730  Date of procedure: 4/22/2022  Treatment: The following muscles were treated today with intramuscular dry needling  [] Left [] Right Masster  [] Left [] Right Temporalis  [] Left [] Right Zygomaticus Major / Minor  [] Left [] Right Lateral Pterygoid  [] Left [] Right Medial Pterygoid  [] Left [] Right Digastric Post / Anterior Belly  [] Left [] Right Sternocleidomastoid  [] Left [] Right Scalene Anterior / Medial / Posterior  [] Left [] Right Extra Laryngeal Muscles  [x] Left [] Right Upper Trapezius  [] Left [] Right Middle Trapezius  [] Left [] Right Lower Trapezius  [] Left [] Right Oblique Capitis Inferior  [] Left [] Right Splenius Capitis / Cervicis  [] Left [] Right Semispinalis: Capitis / Cervicis  [] Left [x] Right Multifidi - Lumbar  [] Left [x] Right Longissimus Lumbar  [] Left [] Right Levator Scapulae  [] Left [] Right Supraspinatus / Infraspinatus  [] Left [] Right Teres Major / Minor  [] Left [] Right Rhomboids / Serratus posterior superior  [] Left [] Right Pectoralis Major / Minor  [] Left [] Right Serratus Anterior  [] Left [] Right Latissimus Dorsi  [] Left [] Right Subscapularis  [] Left [] Right Coracobrachialis  [] Left [] Right Biceps Brachii  [] Left [] Right Deltoid: Anterior / Medial / Posterior  [] Left [] Right Brachialis  [] Left [] Right Triceps  [] Left [] Right Brachioradialis  [] Left [] Right Extensor Carpi Radialis Brevis / Extensor Carpi Radialis Longus    [] Left [] Right  Extensor digitorum  [] Left [] Right Supinator / Pronator Teres  [] Left [] Right Flexor Carpi Radialis/ Flexor Carpi Ulnaris   [] Left [] Right  Flexor Digitorum Superficialis/ Flexor Digitorum Profundus  [] Left [] Right Flexor Pollicis Longus / Flexor Pollicis Brevis / Palmaris Longus  [] Left [] Right Abductor Pollicis Longus / Abductor Pollicis Brevis  [] Left [] Right Opponens Pollicis / Adductor Pollicis  [] Left [] Right Dorsal / Palmar Interossei / Lumbricalis  [] Left [] Right Abductor Digiti Minimi / Opponens Digiti Minimi    Patient's response to today's treatment:  [x] Latent Twitch Response     [x] Muscle relaxation [x] Pain Relief (lumbar)  [x] Post needling soreness(uppper trap)    [x] without complications  [x] Increased Range of Motion   [] Decreased headaches    [] Decreased Tinnitus  [] Other:     Performed by: Lili Villanueva, PT              With   [] TE   [] TA   [] neuro   [] other: Patient Education: [x] Review HEP    [] Progressed/Changed HEP based on:   [] positioning   [] body mechanics   [] transfers   [x] heat/ice application    [] other:      Other Objective/Functional Measures:       Pain Level (0-10 scale) post treatment: 0 for lumbar, 8 for left UT    ASSESSMENT/Changes in Function: Significant twitch response of the left UT today with DN with improved ROM. + post DN soreness noted following in the UT but full pain relief of the lumbar region. The pt has met updated goals 1 and 3 and may be ready for discharge following next session.      Patient will continue to benefit from skilled PT services to modify and progress therapeutic interventions, address functional mobility deficits, address ROM deficits, address strength deficits, analyze and address soft tissue restrictions, analyze and cue movement patterns and assess and modify postural abnormalities to attain remaining goals. []  See Plan of Care  []  See progress note/recertification  []  See Discharge Summary         Progress towards goals / Updated goals:              4. Patient will increase bilateral cervical SB to 30 degrees and left rotation to at least 60 degrees to improve ease with daily activities with greater ease.              PN: right SB 22 degrees, left 20 degrees,  Left rotation 60 degrees              Current: MET- B SB  30 degrees  2. Patient will increase right glute med and max strength to at least 5-/5 without increase in pain to promote improved ease with work activities.                PN: 4/5 glute med, 4-/5 glute max   3. Patient will report waking up </=2x/night due to pain to promote improved overall quality of life.               PN: improving              Current: MET on 4-22-22  4. Patient will increase FOTO Score to at least 58 to promote improved ease with household activities.                PN: 40    PLAN  []  Upgrade activities as tolerated     [x]  Continue plan of care  []  Update interventions per flow sheet       []  Discharge due to:_  []  Other:_      José Clarke, PT 4/22/2022  7:47 AM    Future Appointments   Date Time Provider Chay Meng   5/25/2022 10:45 AM [facility] LAB VISIT [facility] BS AMB   5/31/2022 10:40 AM Irais Ortega MD [facility] BS AMB

## 2022-05-02 ENCOUNTER — HOSPITAL ENCOUNTER (OUTPATIENT)
Dept: PHYSICAL THERAPY | Age: 57
Discharge: HOME | End: 2022-05-02
Attending: INTERNAL MEDICINE
Payer: COMMERCIAL

## 2022-05-02 PROCEDURE — 97110 THERAPEUTIC EXERCISES: CPT

## 2022-05-02 PROCEDURE — 20560 NDL INSJ W/O NJX 1 OR 2 MUSC: CPT

## 2022-05-02 PROCEDURE — 97140 MANUAL THERAPY 1/> REGIONS: CPT

## 2022-05-02 PROCEDURE — 97112 NEUROMUSCULAR REEDUCATION: CPT

## 2022-05-02 NOTE — PROGRESS NOTES
PT DISCHARGE DAILY NOTE AND [id number]    Patient name: Radha Guzman of Care: 3/8/2022   Referral Salvatore Goode MD : [id number]                Medical Diagnosis: Low back pain [M54.50]  Payor: Leticia Guerrero / Plan: Kristen Pedlar / Product Type: Commerical /  Onset Date: ~30-40 days prior to [facility]   Treatment Diagnosis: neck pain, low back pain   Prior Hospitalization: see medical history Provider#: [id number]   Medications: Verified on Patient summary List    Comorbidities: allergies, back pain, BMI>30, HTN, C3-5 fusion (2018)   Prior Level of Function: right hand dominant, no limitations with daily/work tasks prior to onset 30-40 days ago        Visits from Start of Care: 6    Missed Visits: 3    Reporting Period : 22 to 22    Date:2022  : 1965  [x]  Patient  Verified  Payor: Leticia Guerrero / Plan: Kristen Pedlar / Product Type: Commerical /    In [id number]  Out [id number]  Total Treatment Time (min): 49  Visit #: 2 of 2      SUBJECTIVE  Pain Level (0-10 scale): hip -3, 6- shoulder  Any medication changes, allergies to medications, adverse drug reactions, diagnosis change, or new procedure performed?: [x] No    [] Yes (see summary sheet for update)  Subjective functional status/changes:   [] No changes reported  The pt reports she felt better after DN and areas are not as tight. She reports improved c/s AROM. Her main complaint is now left shoulder socket area pain.      OBJECTIVE    Modality rationale: decrease inflammation and decrease pain to improve the patients ability to perform ADL   Min Type Additional Details    [] Estim:  []Unatt       []IFC  []Premod                        []Other:  []w/ice   []w/heat  Position:  Location:    [] Estim: []Att    []TENS instruct  []NMES                    []Other:  []w/US   []w/ice   []w/heat  Position:  Location:    []  Traction: [] Cervical       []Lumbar                       [] Prone          []Supine                       []Intermittent   []Continuous Lbs:  [] before manual  [] after manual    []  Ultrasound: []Continuous   [] Pulsed                           []1MHz   []3MHz W/cm2:  Location:    []  Iontophoresis with dexamethasone         Location: [] Take home patch   [] In clinic   10 [x]  Ice     []  heat  []  Ice massage  []  Laser   []  Anodyne Position:seated  Location:left UT, lumbar    []  Laser with stim  []  Other:  Position:  Location:    []  Vasopneumatic Device    []  Right     []  Left  Pre-treatment girth:  Post-treatment girth:  Measured at (location):  Pressure:       [] lo [] med [] hi   Temperature: [] lo [] med [] hi   [] Skin assessment post-treatment:  []intact []redness- no adverse reaction    []redness - adverse reaction:       10 min Therapeutic Exercise:  [x] See flow sheet :   Rationale: increase ROM and increase strength to improve the patients ability to perform ADL       10 min Neuromuscular Re-education:  [x]  See flow sheet : Core align series, [place] series   Rationale: increase strength, improve coordination and increase proprioception  to improve the patients ability to perform ADL    17 min Manual Therapy:  IMDN to include education, assessment, set-up, palpation, hemostasis, STM/stretch to treated areas and reassessment only     The manual therapy interventions were performed at a separate and distinct time from the therapeutic activities interventions. Rationale: decrease pain, increase ROM, increase tissue extensibility and decrease trigger points to perform ADL  2 min Dry Needling:   [x]  CPT 81791:  needle insertion(s) without injection(s); 1 or 2 muscle(s)  []  CPT 54164:  needle insertion(s) without injection(s); 3 or more muscles. Rationale: decrease pain, increase tissue extensibility and decrease trigger points to perform ADL    Dry Needling Procedure Note    Procedure:  An intramuscular manual therapy (dry needling) and a neuro-muscular re-education treatment was done to deactivate myofascial trigger points with a 30 gauge filament needle under aseptic technique. Indications:  [x] Myofascial pain and dysfunction [] Muscled spasms  [x] Myalgia/myositis   [] Muscle cramps  [] Muscle imbalances  [] Temporomandibular Dysfunction  [] Other:    Chart reviewed for the following:  Tayler MARK PT, have reviewed the medical history, summary list and precautions/contraindications for [employer].   TIME OUT performed immediately prior to start of procedure:  Tayler MARK PT, have performed the following reviews on [employer] prior to the start of the session:      [x] Verified patient identification by name and date of birth    [x] Agreement on all muscles being treated was verified   [x] Purpose of dry needling, side effects, possible complications, risks and benefits were explained to the patient   [x] Procedure site(s) verified  [x] Patient was positioned for comfort and draped for privacy  [x] Informed Consent was signed (initial visit) and verified verbally (subsequent visits)  [x] Patient was instructed on the need to report the use of blood thinners and/or immunosuppressant medications  [x] How to respond to possible adverse effects of treatment  [x] Self treatment of post needling soreness: ice, heat (moist heat, heat wraps) and stretching  [x] Opportunity was given to ask any questions, all questions were answered            Time: 9053  Date of procedure: 5/2/2022  Treatment: The following muscles were treated today with intramuscular dry needling  [] Left [] Right Masster  [] Left [] Right Temporalis  [] Left [] Right Zygomaticus Major / Minor  [] Left [] Right Lateral Pterygoid  [] Left [] Right Medial Pterygoid  [] Left [] Right Digastric Post / Anterior Belly  [] Left [] Right Sternocleidomastoid  [] Left [] Right Scalene Anterior / Medial / Posterior  [] Left [] Right Extra Laryngeal Muscles  [x] Left [] Right Upper Trapezius  [] Left [] Right Middle Trapezius  [] Left [] Right Lower Trapezius  [] Left [] Right Oblique Capitis Inferior  [] Left [] Right Splenius Capitis / Cervicis  [] Left [] Right Semispinalis: Capitis / Cervicis  [] Left [x] Right Multifidi lumbar  [] Left [] Right Longissimus Thoracis / Illiocostalis  [] Left [] Right Levator Scapulae  [] Left [] Right Supraspinatus / Infraspinatus  [] Left [] Right Teres Major / Minor  [] Left [] Right Rhomboids / Serratus posterior superior  [] Left [] Right Pectoralis Major / Minor  [] Left [] Right Serratus Anterior  [] Left [] Right Latissimus Dorsi  [] Left [] Right Subscapularis  [] Left [] Right Coracobrachialis  [] Left [] Right Biceps Brachii  [] Left [] Right Deltoid: Anterior / Medial / Posterior  [] Left [] Right Brachialis  [] Left [] Right Triceps  [] Left [] Right Brachioradialis  [] Left [] Right Extensor Carpi Radialis Brevis / Extensor Carpi Radialis Longus    [] Left [] Right  Extensor digitorum  [] Left [] Right Supinator / Pronator Teres  [] Left [] Right Flexor Carpi Radialis/ Flexor Carpi Ulnaris   [] Left [] Right  Flexor Digitorum Superficialis/ Flexor Digitorum Profundus  [] Left [] Right Flexor Pollicis Longus / Flexor Pollicis Brevis / Palmaris Longus  [] Left [] Right Abductor Pollicis Longus / Abductor Pollicis Brevis  [] Left [] Right Opponens Pollicis / Adductor Pollicis  [] Left [] Right Dorsal / Palmar Interossei / Lumbricalis  [] Left [] Right Abductor Digiti Minimi / Opponens Digiti Minimi    Patient's response to today's treatment:  [x] Latent Twitch Response     [x] Muscle relaxation [] Pain Relief  [x] Post needling soreness    [x] without complications  [] Increased Range of Motion   [] Decreased headaches    [] Decreased Tinnitus  [] Other:     Performed by: Reji Letters, PT            With   [] TE   [] TA   [] neuro   [] other: Patient Education: [x] Review HEP    [] Progressed/Changed HEP based on:   [] positioning   [] body mechanics   [] transfers   [x] heat/ice application    [] other: Other Objective/Functional Measures:       Pain Level (0-10 scale) post treatment: 0-l/s, 6-UT    Summary of Care:              2. Patient will increase bilateral cervical SB to 30 degrees and left rotation to at least 60 degrees to improve ease with daily activities with greater ease.              PN: right SB 22 degrees, left 20 degrees,  Left rotation 60 degrees              Current: MET- B SB  30 degrees  2. Patient will increase right glute med and max strength to at least 5-/5 without increase in pain to promote improved ease with work activities.                PN: 4/5 glute med, 4-/5 glute max    Current: progressed  4/5  3. Patient will report waking up </=2x/night due to pain to promote improved overall quality of life.               PN: improving              Current: MET on 4-22-22  4. Patient will increase FOTO Score to at least 58 to promote improved ease with household activities.                PN: 44   Current: 66- met on 5-2-22    ASSESSMENT/Changes in Function: The pt shows improved ROM and decreased myofascial tightness and diminished overall pain. She has exceeded the predicated FOTO outcome, indicating better than predicted results in ability for functional activities. She feels she is now ready for discharge. Her primary complaint at this time is internal pain in the left shoulder.       Thank you for this referral!      PLAN  [x]Discontinue therapy: [x]Patient has reached or is progressing toward set goals      []Patient is non-compliant or has abdicated      []Due to lack of appreciable progress towards set [address], PT 5/2/2022  7:52 AM

## 2022-05-24 ENCOUNTER — TELEPHONE (OUTPATIENT)
Dept: INTERNAL MEDICINE CLINIC | Age: 57
End: 2022-05-24

## 2022-05-24 NOTE — TELEPHONE ENCOUNTER
Pt returned call and made aware of Dr Wade Rodriguez message. She then asked for appt 05/31/22 to be changed to virtual for shoulder issue, pain both shoulders more on left side     She wanted to be sure Dr Mago Gonzales could see the PT sessions and dry needling.      She re-scheduled 3 mo f/u and labs to late June and early July

## 2022-05-24 NOTE — TELEPHONE ENCOUNTER
Pt calling asking if she can change her upcoming visit to a Virtual? Is this an appt you can do virtually or do you prefer to see in person? She is also wanting to cancel the lab appt or reschedule it since she wants to do it on a Monday. She wanted the answer on the office visit before rescheduling labs.

## 2022-05-29 RX ORDER — CLONIDINE 0.1 MG/24H
PATCH, EXTENDED RELEASE TRANSDERMAL
Qty: 12 PATCH | Refills: 3 | Status: SHIPPED | OUTPATIENT
Start: 2022-05-29

## 2022-06-27 ENCOUNTER — APPOINTMENT (OUTPATIENT)
Dept: INTERNAL MEDICINE CLINIC | Age: 57
End: 2022-06-27

## 2022-06-28 LAB
CREATININE, EXTERNAL: 1.06
HBA1C MFR BLD HPLC: 5.8 %
LDL-C, EXTERNAL: 99

## 2022-07-01 ENCOUNTER — TRANSCRIBE ORDER (OUTPATIENT)
Dept: SCHEDULING | Age: 57
End: 2022-07-01

## 2022-07-01 DIAGNOSIS — I63.033 CEREBRAL INFARCTION DUE TO BILATERAL THROMBOSIS OF CAROTID ARTERIES (HCC): Primary | ICD-10-CM

## 2022-07-05 ENCOUNTER — TRANSCRIBE ORDER (OUTPATIENT)
Dept: SCHEDULING | Age: 57
End: 2022-07-05

## 2022-07-05 DIAGNOSIS — H81.10 BENIGN PAROXYSMAL POSITIONAL VERTIGO: Primary | ICD-10-CM

## 2022-07-08 ENCOUNTER — HOSPITAL ENCOUNTER (OUTPATIENT)
Dept: VASCULAR SURGERY | Age: 57
Discharge: HOME | End: 2022-07-08
Payer: OTHER GOVERNMENT

## 2022-07-08 DIAGNOSIS — I63.033 CEREBRAL INFARCTION DUE TO BILATERAL THROMBOSIS OF CAROTID ARTERIES (HCC): ICD-10-CM

## 2022-07-08 LAB
LEFT BULB EDV: 14.5 CM/S
LEFT BULB PSV: 31.1 CM/S
LEFT CCA DIST DIAS: 24.1 CM/S
LEFT CCA DIST SYS: 64.2 CM/S
LEFT CCA MID DIAS: 30.54 CM/S
LEFT CCA MID SYS: 81.02 CM/S
LEFT CCA PROX DIAS: 22.8 CM/S
LEFT CCA PROX SYS: 65.5 CM/S
LEFT ECA DIAS: 24.07 CM/S
LEFT ECA SYS: 81 CM/S
LEFT ICA DIST DIAS: 34.9 CM/S
LEFT ICA DIST SYS: 62.4 CM/S
LEFT ICA MID DIAS: 29.5 CM/S
LEFT ICA MID SYS: 63 CM/S
LEFT ICA PROX DIAS: 29 CM/S
LEFT ICA PROX SYS: 55.3 CM/S
LEFT ICA/CCA SYS: 0.98 NO UNITS
LEFT VERTEBRAL DIAS: 13.72 CM/S
LEFT VERTEBRAL SYS: 34.4 CM/S
RIGHT BULB EDV: 19.4 CM/S
RIGHT BULB PSV: 63.3 CM/S
RIGHT CCA DIST DIAS: 25.6 CM/S
RIGHT CCA DIST SYS: 68.5 CM/S
RIGHT CCA MID DIAS: 26.71 CM/S
RIGHT CCA MID SYS: 73.97 CM/S
RIGHT CCA PROX DIAS: 22.1 CM/S
RIGHT CCA PROX SYS: 46.5 CM/S
RIGHT ECA DIAS: 10.12 CM/S
RIGHT ECA SYS: 45.9 CM/S
RIGHT ICA DIST DIAS: 40.9 CM/S
RIGHT ICA DIST SYS: 75.8 CM/S
RIGHT ICA MID DIAS: 35.7 CM/S
RIGHT ICA MID SYS: 74.5 CM/S
RIGHT ICA PROX DIAS: 39.1 CM/S
RIGHT ICA PROX SYS: 79.8 CM/S
RIGHT ICA/CCA SYS: 1.2 NO UNITS
RIGHT VERTEBRAL DIAS: 17.1 CM/S
RIGHT VERTEBRAL SYS: 37.2 CM/S
VAS LEFT SUBCLAVIAN PROX EDV: 11.2 CM/S
VAS LEFT SUBCLAVIAN PROX PSV: 100 CM/S
VAS RIGHT SUBCLAVIAN PROX EDV: 5.1 CM/S
VAS RIGHT SUBCLAVIAN PROX PSV: 76.2 CM/S

## 2022-07-08 PROCEDURE — 93880 EXTRACRANIAL BILAT STUDY: CPT

## 2022-07-11 ENCOUNTER — TELEPHONE (OUTPATIENT)
Dept: INTERNAL MEDICINE CLINIC | Age: 57
End: 2022-07-11

## 2022-07-11 ENCOUNTER — OFFICE VISIT (OUTPATIENT)
Dept: INTERNAL MEDICINE CLINIC | Age: 57
End: 2022-07-11
Payer: OTHER GOVERNMENT

## 2022-07-11 VITALS
WEIGHT: 203 LBS | TEMPERATURE: 97.5 F | OXYGEN SATURATION: 96 % | HEIGHT: 65 IN | SYSTOLIC BLOOD PRESSURE: 118 MMHG | BODY MASS INDEX: 33.82 KG/M2 | HEART RATE: 68 BPM | DIASTOLIC BLOOD PRESSURE: 76 MMHG | RESPIRATION RATE: 20 BRPM

## 2022-07-11 DIAGNOSIS — R73.03 PREDIABETES: ICD-10-CM

## 2022-07-11 DIAGNOSIS — E55.9 VITAMIN D DEFICIENCY: ICD-10-CM

## 2022-07-11 DIAGNOSIS — H81.13 BENIGN PAROXYSMAL POSITIONAL VERTIGO DUE TO BILATERAL VESTIBULAR DISORDER: ICD-10-CM

## 2022-07-11 DIAGNOSIS — I10 ESSENTIAL HYPERTENSION: Primary | ICD-10-CM

## 2022-07-11 DIAGNOSIS — E78.00 PURE HYPERCHOLESTEROLEMIA: ICD-10-CM

## 2022-07-11 PROCEDURE — 99214 OFFICE O/P EST MOD 30 MIN: CPT | Performed by: INTERNAL MEDICINE

## 2022-07-11 NOTE — PROGRESS NOTES
Subjective:       Chief Complaint  The patient presents for follow up of hypertension and high cholesterol. Vit D deficiency, prediabetes         HPI  Ruchi Pinon is a 61 y.o. female seen for follow up of hyperlipidemia. Sheraymundoo has hypertension. Hypertension  well controlled, no significant medication side effects noted, on catapres which is also for hot flashes and Zestoretic 20/12.5 BID,  hyperlipidemia fairly well controlled on Zocor 20 mg. LDL down to 99. She continues to struggle to lose weight by cutting back starches and snacks. No significant medication side effects noted, on Zocor 20 mg. She will continue to try to AIM for not eating more than 3x/day and more protein and fiber in her diet. She has mild prediabeties which she is trying to control with cutting back starches and sweets and losing weight. .  She was on Metformin at one time but not currently. .     Diet and Lifestyle: generally follows a low fat low cholesterol diet, does not rigorously follow a diabetic diet, exercises regularly    Home BP Monitoring: is not measured at home. Other symptoms and concerns: vit D level is fairly  well controlled on  vit D 5000 units/week    Patient has gastroesophageal reflux disease that is controlled on Prilosec 40 mg daily. Pt has some mild vertigo for which she will try home exercises. Can consider vestibular therapy in future if needed. Ms. Wilma Sherman has a history of lymphedema in her abdomen, legs and feet that has been ongoing since she prophylactic mastectomy in 2011 along with breast reconstruction surgery. She requires compression garments (pantyhose with open and close toe, high waist capris and knee-high socks). She also needs Flexi-touch lymphedema pump which she can use on a daily basis to control her lymphedema. Current Outpatient Medications   Medication Sig    LACTULOSE PO Take  by mouth.     cloNIDine (CATAPRES) 0.1 mg/24 hr ptwk APPLY 1 PATCH TO SKIN AND CHANGE EVERY 7 DAYS  simvastatin (ZOCOR) 20 mg tablet TAKE 1 TABLET BY MOUTH EVERYDAY AT BEDTIME    fluticasone propionate (FLONASE) 50 mcg/actuation nasal spray 2 Sprays by Both Nostrils route daily.  lisinopril-hydroCHLOROthiazide (PRINZIDE, ZESTORETIC) 20-12.5 mg per tablet TAKE 1 TABLET BY MOUTH TWICE A DAY    levalbuterol tartrate (XOPENEX) 45 mcg/actuation inhaler INHALE 1 TO 2 PUFFS BY MOUTH EVERY 4 TO 6 HOURS AS NEEDED    sodium chloride (Saline Nasal Mist) 0.65 % nasal squeeze bottle 0.05 mL by Both Nostrils route as needed for Congestion.  omeprazole (PRILOSEC) 40 mg capsule Take 40 mg by mouth daily.  cyclobenzaprine (FLEXERIL) 5 mg tablet TAKE 1-2 TABLETS BY MOUTH AT NIGHT.  YUVAFEM 10 mcg tab vaginal tablet INSERT 1 APPLICATORFUL VAGINALLY AT BEDTIME FOR 14 DAYS THEN TWICE PER WEEK    LACTOBACILLUS ACIDOPHILUS (PROBIOTIC PO) Take  by mouth.  valACYclovir (VALTREX) 500 mg tablet Take 500 mg by mouth daily.  vitamin e 400 unit tab Take 1 Cap by mouth daily.  Cholecalciferol, Vitamin D3, (VITAMIN D) 2,000 unit cap capsule Take 2,000 Units by mouth daily.  cpap machine kit by Does Not Apply route. Replace mask, headgear, heated hose, filters and accessories as needed for one year. CPAP pressure: 9 cwp  Mask: standard Mirage FX plus Opus nasal pillows mask, or mask of choice  Homecare Company: [employer]  Download data 30 days after initiation of therapy     No current facility-administered medications for this visit.              Review of Systems  Respiratory: negative for dyspnea on exertion  Cardiovascular: negative for chest pain    Objective:     Visit Vitals  /76 (BP 1 Location: Right arm, BP Patient Position: Sitting, BP Cuff Size: Adult)   Pulse 68   Temp 97.5 °F (36.4 °C) (Temporal)   Resp 20   Ht 5' 5\" (1.651 m)   Wt 203 lb (92.1 kg)   LMP 06/16/2003   SpO2 96%   BMI 33.78 kg/m²        General appearance - alert, well appearing, and in no distress  Chest - clear to auscultation, no wheezes, rales or rhonchi, symmetric air entry  Heart - normal rate, regular rhythm, normal S1, S2, no murmurs, rubs, clicks or gallops  Extremities - peripheral pulses normal, no pedal edema, no clubbing or cyanosis  Skin - normal coloration and turgor, no rashes, no suspicious skin lesions noted      Labs:   Lab Results   Component Value Date/Time    Cholesterol, total 188 10/13/2021 01:42 PM    HDL Cholesterol 59 10/13/2021 01:42 PM    LDL-CHOLESTEROL 108 (H) 10/13/2021 01:42 PM    LDL, calculated 101 (H) 08/12/2015 09:00 AM    LDL-C, External 96 04/23/2015 12:00 AM    Triglyceride 113 10/13/2021 01:42 PM    Cholesterol/HDL ratio 3.2 10/13/2021 01:42 PM     Lab Results   Component Value Date/Time    Sodium 142 10/13/2021 01:42 PM    Potassium 3.6 10/13/2021 01:42 PM    Chloride 102 10/13/2021 01:42 PM    CO2 29 10/13/2021 01:42 PM    Anion gap 5 10/18/2016 05:28 PM    Glucose 81 10/13/2021 01:42 PM    BUN 12 10/13/2021 01:42 PM    Creatinine 0.87 10/13/2021 01:42 PM    BUN/Creatinine ratio NOT APPLICABLE 39/27/8268 42:30 PM    GFR est AA 86 10/13/2021 01:42 PM    GFR est non-AA 74 10/13/2021 01:42 PM    Calcium 9.9 10/13/2021 01:42 PM    Bilirubin, total 0.6 10/13/2021 01:42 PM    ALT (SGPT) 20 10/13/2021 01:42 PM    Alk. phosphatase 75 01/29/2020 09:48 AM    Protein, total 7.4 10/13/2021 01:42 PM    Albumin 4.4 10/13/2021 01:42 PM    Globulin 3.0 10/13/2021 01:42 PM    A-G Ratio 1.1 10/18/2016 05:28 PM        Labs:   Lab Results   Component Value Date/Time    WBC 3.3 (L) 07/18/2019 08:56 AM    HGB 13.5 07/18/2019 08:56 AM    HCT 40.8 07/18/2019 08:56 AM    PLATELET 121 45/88/2802 08:56 AM    MCV 93.2 07/18/2019 08:56 AM     Lab Results   Component Value Date/Time    Hemoglobin A1c 5.7 (H) 10/13/2021 01:42 PM    Hemoglobin A1c, External 5.8 11/11/2016 12:00 AM        Labs collected 6/27/2022 and reviewed. Assessment / Plan     Hypertension well controlled, on current meds.     Hyperlipidemia borderline controlled, on Zocor 20 mg. Will try to improve it further with diet and weight loss. ICD-10-CM ICD-9-CM    1. Essential hypertension  I10 401.9 LIPID PANEL   2. Pure hypercholesterolemia  P17.68 827.3 METABOLIC PANEL, COMPREHENSIVE   3. Prediabetes  R73.03 790.29 Controlled with diet HEMOGLOBIN A1C W/O EAG   4. Vitamin D deficiency  E55.9 268.9 Well controlled on vit D 5000 units/day VITAMIN D, 25 HYDROXY   5. Benign paroxysmal positional vertigo due to bilateral vestibular disorder  H81.13 386.11 Will try home exercises. 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 Follow-up and Dispositions    · Return in about 4 months (around 11/11/2022) for labs 1 week before. Reviewed plan of care. Patient has provided input and agrees with goals.

## 2022-07-11 NOTE — PATIENT INSTRUCTIONS
High Blood Pressure: Care Instructions  Overview     It's normal for blood pressure to go up and down throughout the day. But if it stays up, you have high blood pressure. Another name for high blood pressure is hypertension. Despite what a lot of people think, high blood pressure usually doesn't cause headaches or make you feel dizzy or lightheaded. It usually has no symptoms. But it does increase your risk of stroke, heart attack, and other problems. You and your doctor will talk about your risks of these problems based on your blood pressure. Your doctor will give you a goal for your blood pressure. Your goal will be based on your health and your age. Lifestyle changes, such as eating healthy and being active, are always important to help lower blood pressure. You might also take medicine to reach your blood pressure goal.  Follow-up care is a key part of your treatment and safety. Be sure to make and go to all appointments, and call your doctor if you are having problems. It's also a good idea to know your test results and keep a list of the medicines you take. How can you care for yourself at home? Medical treatment  · If you stop taking your medicine, your blood pressure will go back up. You may take one or more types of medicine to lower your blood pressure. Be safe with medicines. Take your medicine exactly as prescribed. Call your doctor if you think you are having a problem with your medicine. · Talk to your doctor before you start taking aspirin every day. Aspirin can help certain people lower their risk of a heart attack or stroke. But taking aspirin isn't right for everyone, because it can cause serious bleeding. · See your doctor regularly. You may need to see the doctor more often at first or until your blood pressure comes down. · If you are taking blood pressure medicine, talk to your doctor before you take decongestants or anti-inflammatory medicine, such as ibuprofen.  Some of these medicines can raise blood pressure. · Learn how to check your blood pressure at home. Lifestyle changes  · Stay at a healthy weight. This is especially important if you put on weight around the waist. Losing even 10 pounds can help you lower your blood pressure. · If your doctor recommends it, get more exercise. Walking is a good choice. Bit by bit, increase the amount you walk every day. Try for at least 30 minutes on most days of the week. You also may want to swim, bike, or do other activities. · Avoid or limit alcohol. Talk to your doctor about whether you can drink any alcohol. · Try to limit how much sodium you eat to less than 2,300 milligrams (mg) a day. Your doctor may ask you to try to eat less than 1,500 mg a day. · Eat plenty of fruits (such as bananas and oranges), vegetables, legumes, whole grains, and low-fat dairy products. · Lower the amount of saturated fat in your diet. Saturated fat is found in animal products such as milk, cheese, and meat. Limiting these foods may help you lose weight and also lower your risk for heart disease. · Do not smoke. Smoking increases your risk for heart attack and stroke. If you need help quitting, talk to your doctor about stop-smoking programs and medicines. These can increase your chances of quitting for good. When should you call for help? Call 911  anytime you think you may need emergency care. This may mean having symptoms that suggest that your blood pressure is causing a serious heart or blood vessel problem. Your blood pressure may be over 180/120. For example, call 911 if:    · You have symptoms of a heart attack. These may include:  ? Chest pain or pressure, or a strange feeling in the chest.  ? Sweating. ? Shortness of breath. ? Nausea or vomiting. ? Pain, pressure, or a strange feeling in the back, neck, jaw, or upper belly or in one or both shoulders or arms. ? Lightheadedness or sudden weakness.   ? A fast or irregular heartbeat.     · You have symptoms of a stroke. These may include:  ? Sudden numbness, tingling, weakness, or loss of movement in your face, arm, or leg, especially on only one side of your body. ? Sudden vision changes. ? Sudden trouble speaking. ? Sudden confusion or trouble understanding simple statements. ? Sudden problems with walking or balance. ? A sudden, severe headache that is different from past headaches.     · You have severe back or belly pain. Do not wait until your blood pressure comes down on its own. Get help right away. Call your doctor now or seek immediate care if:    · Your blood pressure is much higher than normal (such as 180/120 or higher), but you don't have symptoms.     · You think high blood pressure is causing symptoms, such as:  ? Severe headache.  ? Blurry vision. Watch closely for changes in your health, and be sure to contact your doctor if:    · Your blood pressure measures higher than your doctor recommends at least 2 times. That means the top number is higher or the bottom number is higher, or both.     · You think you may be having side effects from your blood pressure medicine. Where can you learn more? Go to http://www.gray.com/  Enter [id number] in the search box to learn more about \"High Blood Pressure: Care Instructions. \"  Current as of: January 10, 2022               Content Version: 13.2  © 2006-2022 [employer]. Care instructions adapted under license by [employer] (which disclaims liability or warranty for this information). If you have questions about a medical condition or this instruction, always ask your healthcare professional. [place] [address] any warranty or liability for your use of this information. Vertigo: Exercises  Introduction  Here are some examples of exercises for you to try. The exercises may be suggested for a condition or for rehabilitation. Start each exercise slowly.  Ease off the exercises if you start to have pain. You will be told when to start these exercises and which ones will work best for you. How to do the exercises  Exercise 1    1. Stand with a chair in front of you and a wall behind you. If you begin to fall, you may use them for support. 2. Stand with your feet together and your arms at your sides. 3. Move your head up and down 10 times. Exercise 2    1. Move your head side to side 10 times. Exercise 3    1. Move your head diagonally up and down 10 times. Exercise 4    1. Move your head diagonally up and down 10 times on the other side. Follow-up care is a key part of your treatment and safety. Be sure to make and go to all appointments, and call your doctor if you are having problems. It's also a good idea to know your test results and keep a list of the medicines you take. Where can you learn more? Go to http://www.gray.com/  Enter F349 in the search box to learn more about \"Vertigo: Exercises. \"  Current as of: September 8, 2021               Content Version: 13.2  © 2006-2022 [employer]. Care instructions adapted under license by [employer] (which disclaims liability or warranty for this information). If you have questions about a medical condition or this instruction, always ask your healthcare professional. [address] any warranty or liability for your use of this information.

## 2022-07-11 NOTE — PROGRESS NOTES
Patient is in the office today for a 4 month follow up. 1. \"Have you been to the ER, urgent care clinic since your last visit? Hospitalized since your last visit? \" No    2. \"Have you seen or consulted any other health care providers outside of the [address] [place] [address] since your last visit? \" yes, Dr. Letty Lee and Dr. Roberto Zuniga [facility].     3. For patients aged 39-70: Has the patient had a colonoscopy / FIT/ Cologuard? Yes - no Care Gap present      If the patient is female:    4. For patients aged 41-77: Has the patient had a mammogram within the past 2 years? Yes - no Care Gap present      5. For patients aged 21-65: Has the patient had a pap smear?  Yes - no Care Gap present

## 2022-07-18 ENCOUNTER — HOSPITAL ENCOUNTER (OUTPATIENT)
Age: 57
Discharge: HOME | End: 2022-07-18
Payer: OTHER GOVERNMENT

## 2022-07-18 DIAGNOSIS — H81.10 BENIGN PAROXYSMAL POSITIONAL VERTIGO: ICD-10-CM

## 2022-07-18 PROCEDURE — 70551 MRI BRAIN STEM W/O DYE: CPT

## 2022-07-26 ENCOUNTER — OFFICE VISIT (OUTPATIENT)
Dept: ORTHOPEDIC SURGERY | Age: 57
End: 2022-07-26
Payer: COMMERCIAL

## 2022-07-26 VITALS
BODY MASS INDEX: 34.02 KG/M2 | HEIGHT: 65 IN | WEIGHT: 204.2 LBS | OXYGEN SATURATION: 100 % | HEART RATE: 66 BPM | TEMPERATURE: 97 F

## 2022-07-26 DIAGNOSIS — M25.512 ACUTE PAIN OF LEFT SHOULDER: Primary | ICD-10-CM

## 2022-07-26 DIAGNOSIS — M75.102 ROTATOR CUFF SYNDROME OF LEFT SHOULDER: ICD-10-CM

## 2022-07-26 PROCEDURE — 99204 OFFICE O/P NEW MOD 45 MIN: CPT | Performed by: ORTHOPAEDIC SURGERY

## 2022-07-26 PROCEDURE — 73030 X-RAY EXAM OF SHOULDER: CPT | Performed by: ORTHOPAEDIC SURGERY

## 2022-07-26 NOTE — PROGRESS NOTES
Patient: Michelle Gatica                MRN: [id number]       SSN: [id number]  YOB: 1965        AGE: 61 y.o. SEX: female  Body mass index is 33.98 kg/m². PCP: Rigoberto Dickens MD  07/26/22    CHIEF COMPLAINT: Left shoulder     HPI: Michelle Gatica is a 61 y.o. female patient who presents to the office today with several months of left shoulder pain. She says is been going on for greater than 3 months. She denies any injury or trauma. She has been to physical therapy without resolution of her symptoms. She is also taking over-the-counter medications without resolution of her symptoms and for over 3 months. The pain is worse at night and when trying to raise her arm in certain positions.     Past Medical History:   Diagnosis Date    Adverse effect of anesthesia     Hypothermia    Cardiomegaly     pt unaware    Diverticulosis     GERD (gastroesophageal reflux disease)     H/O tubal ligation     H/O: hysterectomy     Hiatal hernia 2007    History of appendectomy     Hypercholesterolemia     Hypertension     Hypothermia due to anesthesia 2010    During  Breast surgery    Obesity, unspecified     SUBHASH on CPAP 4/8/2013    S/P mastectomy, bilateral     2010 : preventative due to [facility]    Undiagnosed cardiac murmurs     pt unaware    Unspecified adverse effect of anesthesia     was hypothermic in past       Family History   Problem Relation Age of Onset    Cancer Mother         breast    Cancer Sister         breast    Diabetes Maternal Grandmother     Hypertension Maternal Grandmother     Cancer Maternal Grandmother         ovarian    Heart Disease Maternal Grandfather 61        MI    Hypertension Maternal Aunt     Other Maternal Aunt         renal failure    Hypertension Maternal Uncle     Stroke Neg Hx     Heart Attack Neg Hx        Current Outpatient Medications   Medication Sig Dispense Refill    LACTULOSE PO Take  by mouth.      cloNIDine (CATAPRES) 0.1 mg/24 hr ptwk APPLY 1 PATCH TO SKIN AND CHANGE EVERY 7 DAYS 12 Patch 3    simvastatin (ZOCOR) 20 mg tablet TAKE 1 TABLET BY MOUTH EVERYDAY AT BEDTIME 90 Tablet 2    fluticasone propionate (FLONASE) 50 mcg/actuation nasal spray 2 Sprays by Both Nostrils route daily. 1 Each 5    lisinopril-hydroCHLOROthiazide (PRINZIDE, ZESTORETIC) 20-12.5 mg per tablet TAKE 1 TABLET BY MOUTH TWICE A  Tablet 2    levalbuterol tartrate (XOPENEX) 45 mcg/actuation inhaler INHALE 1 TO 2 PUFFS BY MOUTH EVERY 4 TO 6 HOURS AS NEEDED 1 Inhaler 5    sodium chloride (Saline Nasal Mist) 0.65 % nasal squeeze bottle 0.05 mL by Both Nostrils route as needed for Congestion. 30 mL 0    omeprazole (PRILOSEC) 40 mg capsule Take 40 mg by mouth daily. cyclobenzaprine (FLEXERIL) 5 mg tablet TAKE 1-2 TABLETS BY MOUTH AT NIGHT. 90 Tab 1    YUVAFEM 10 mcg tab vaginal tablet INSERT 1 APPLICATORFUL VAGINALLY AT BEDTIME FOR 14 DAYS THEN TWICE PER WEEK  5    LACTOBACILLUS ACIDOPHILUS (PROBIOTIC PO) Take  by mouth. valACYclovir (VALTREX) 500 mg tablet Take 500 mg by mouth daily. 3    vitamin e 400 unit tab Take 1 Cap by mouth daily. Cholecalciferol, Vitamin D3, (VITAMIN D) 2,000 unit cap capsule Take 2,000 Units by mouth daily. 90 capsule 2    cpap machine kit by Does Not Apply route. Replace mask, headgear, heated hose, filters and accessories as needed for one year.    CPAP pressure: 9 cwp  Mask: standard Mirage FX plus Opus nasal pillows mask, or mask of choice  Homecare Company: [employer]  Download data 30 days after initiation of therapy 1 Kit 0       Allergies   Allergen Reactions    Bactrim [Sulfamethoprim Ds] Other (comments)     Made mouth feel thick, speech slurred       Past Surgical History:   Procedure Laterality Date    COLONOSCOPY N/A 6/25/2020    COLONOSCOPY with Polypectomies performed by Karol Wilhelm MD at [address]  2011    nipple reconstruction 2011    [address]  2018    HX HYSTERECTOMY  2003    HX MASTECTOMY Bilateral 2010    bilateral (Preventive)    HX OOPHORECTOMY  2010       Social History     Socioeconomic History    Marital status:      Spouse name: Not on file    Number of children: Not on file    Years of education: Not on file    Highest education level: Not on file   Occupational History    Not on file   Tobacco Use    Smoking status: Never    Smokeless tobacco: Never   Vaping Use    Vaping Use: Never used   Substance and Sexual Activity    Alcohol use: Not Currently     Comment: 30 years ago    Drug use: Never    Sexual activity: Yes     Partners: Male     Birth control/protection: Surgical   Other Topics Concern    Not on file   Social History Narrative    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 [employer]     Social Determinants of Health     Financial Resource Strain: Not on file   Food Insecurity: Not on file   Transportation Needs: Not on file   Physical Activity: Insufficiently Active    Days of Exercise per Week: 3 days    Minutes of Exercise per Session: 10 min   Stress: Not on file   Social Connections: Not on file   Intimate Partner Violence: Not At Risk    Fear of Current or Ex-Partner: No    Emotionally Abused: No    Physically Abused: No    Sexually Abused: No   Housing Stability: Not on file       REVIEW OF SYSTEMS:    14 point review of systems on the intake form is negative except as noted in the HPI    PHYSICAL EXAMINATION:  Visit Vitals  Pulse 66   Temp 97 °F (36.1 °C) (Temporal)   Ht 5' 5\" (1.651 m)   Wt 204 lb 3.2 oz (92.6 kg)   LMP 06/16/2003   SpO2 100%   BMI 33.98 kg/m²     Body mass index is 33.98 kg/m². GENERAL: Alert and oriented x3, in no acute distress, well-developed, well-nourished. HEENT: Normocephalic, atraumatic.     Shoulder Examination     R   L  ROM   FF  Full   Full  ER  Full   Full   IR  Full   Full  Rotator Cuff Pain   Supra  -   +   Infra  -   -   Subscap -   -  Crepitus  -   -  Effusion  -   -  Warmth  -   -   Erythema  -   -  Instability  -   -  AC Joint TTP  -   -  Clavicle   Deformity -   -   TTP  -   -  Proximal Humerus   Deformity -   -   TTP  -   -  Deltoid Strength 5   5  Biceps Strength 5   5  Biceps Deformity -   -  Biceps Groove Pain -   -  Impingement Sign -   +       IMAGING:  Imaging read by myself and interpreted as follows:  X-rays 4 views the left shoulder were taken in the office today which do not show any acute or chronic bony abnormalities. There is sclerosis on the undersurface of the acromion and greater tuberosity concerning for rotator cuff pathology. ASSESSMENT & PLAN  Diagnosis: Left shoulder rotator cuff pain    Adarsh has left shoulder pain concerning for rotator cuff pathology. She has failed conservative management for the time being including physical therapy and anti-inflammatories. At this point I recommend an MRI of the left shoulder to further evaluate this. I will see her back after the MRI is completed. Prescription medication management discussed. Electronically signed by: Kirti Bailon MD    Note: This note was completed using voice recognition software.   Any typographical/name errors or mistakes are unintentional.

## 2022-08-09 DIAGNOSIS — M75.102 ROTATOR CUFF SYNDROME OF LEFT SHOULDER: ICD-10-CM

## 2022-08-15 ENCOUNTER — HOSPITAL ENCOUNTER (OUTPATIENT)
Age: 57
Discharge: HOME | End: 2022-08-15
Attending: ORTHOPAEDIC SURGERY
Payer: OTHER GOVERNMENT

## 2022-08-15 PROCEDURE — 73221 MRI JOINT UPR EXTREM W/O DYE: CPT

## 2022-08-17 ENCOUNTER — TELEPHONE (OUTPATIENT)
Dept: ORTHOPEDIC SURGERY | Age: 57
End: 2022-08-17

## 2022-08-17 NOTE — TELEPHONE ENCOUNTER
Patient called to schedule her left shoulder MRI follow up, MRI was completed on 8/15. Please advise if patient needs to be fit in before next available. Patient can be reached at [phone].

## 2022-08-18 NOTE — TELEPHONE ENCOUNTER
Patient called asking if her MRI results can be given to her over the phone. Patient was offered the next available on 9/7/22, and is asking if it is anyway she can be seen sooner if her results can not be given to her over the phone. Please advise. Patient can be reached at [phone].

## 2022-08-20 ENCOUNTER — TRANSCRIBE ORDER (OUTPATIENT)
Dept: SCHEDULING | Age: 57
End: 2022-08-20

## 2022-08-20 DIAGNOSIS — Z80.3 FAMILY HISTORY OF BREAST CANCER: ICD-10-CM

## 2022-08-20 DIAGNOSIS — N63.25 MASS OVERLAPPING MULTIPLE QUADRANTS OF LEFT BREAST: Primary | ICD-10-CM

## 2022-08-22 ENCOUNTER — OFFICE VISIT (OUTPATIENT)
Dept: ORTHOPEDIC SURGERY | Age: 57
End: 2022-08-22
Payer: COMMERCIAL

## 2022-08-22 VITALS
TEMPERATURE: 98.6 F | HEIGHT: 65 IN | HEART RATE: 98 BPM | WEIGHT: 203 LBS | OXYGEN SATURATION: 99 % | RESPIRATION RATE: 18 BRPM | BODY MASS INDEX: 33.82 KG/M2

## 2022-08-22 DIAGNOSIS — M25.551 RIGHT HIP PAIN: ICD-10-CM

## 2022-08-22 DIAGNOSIS — M53.3 SACROILIAC JOINT PAIN: Primary | ICD-10-CM

## 2022-08-22 PROCEDURE — 99203 OFFICE O/P NEW LOW 30 MIN: CPT | Performed by: ORTHOPAEDIC SURGERY

## 2022-08-22 PROCEDURE — 73502 X-RAY EXAM HIP UNI 2-3 VIEWS: CPT | Performed by: ORTHOPAEDIC SURGERY

## 2022-08-22 RX ORDER — MELOXICAM 15 MG/1
15 TABLET ORAL DAILY
Qty: 90 TABLET | Refills: 1 | Status: SHIPPED | OUTPATIENT
Start: 2022-08-22 | End: 2023-02-18

## 2022-08-22 NOTE — PROGRESS NOTES
Patient: Maribel Berrios                MRN: [id number]       SSN: [id number]  YOB: 1965        AGE: 61 y.o. SEX: female  Body mass index is 33.78 kg/m². PCP: Emeli Chavira MD  08/22/22    CHIEF COMPLAINT: Right hip pain    HPI: Maribel Berrios is a 61 y.o. female patient who Comes in as a new patient for initial evaluation for right sided low back pain that radiates into the right posterior-lateral hip. She denies radicular pain. The pain started about 3 months ago and was not helped by topical creams, physical therapy or dry needling. Past Medical History:   Diagnosis Date    Adverse effect of anesthesia     Hypothermia    Cardiomegaly     pt unaware    Diverticulosis     GERD (gastroesophageal reflux disease)     H/O tubal ligation     H/O: hysterectomy     Hiatal hernia 2007    History of appendectomy     Hypercholesterolemia     Hypertension     Hypothermia due to anesthesia 2010    During  Breast surgery    Obesity, unspecified     SUBHASH on CPAP 4/8/2013    S/P mastectomy, bilateral     2010 : preventative due to [facility]    Undiagnosed cardiac murmurs     pt unaware    Unspecified adverse effect of anesthesia     was hypothermic in past       Family History   Problem Relation Age of Onset    Cancer Mother         breast    Cancer Sister         breast    Diabetes Maternal Grandmother     Hypertension Maternal Grandmother     Cancer Maternal Grandmother         ovarian    Heart Disease Maternal Grandfather 61        MI    Hypertension Maternal Aunt     Other Maternal Aunt         renal failure    Hypertension Maternal Uncle     Stroke Neg Hx     Heart Attack Neg Hx        Current Outpatient Medications   Medication Sig Dispense Refill    meloxicam (Mobic) 15 mg tablet Take 1 Tablet by mouth daily for 180 doses.  90 Tablet 1    LACTULOSE PO Take  by mouth.      cloNIDine (CATAPRES) 0.1 mg/24 hr ptwk APPLY 1 PATCH TO SKIN AND CHANGE EVERY 7 DAYS 12 Patch 3    simvastatin (ZOCOR) 20 mg tablet TAKE 1 TABLET BY MOUTH EVERYDAY AT BEDTIME 90 Tablet 2    fluticasone propionate (FLONASE) 50 mcg/actuation nasal spray 2 Sprays by Both Nostrils route daily. 1 Each 5    lisinopril-hydroCHLOROthiazide (PRINZIDE, ZESTORETIC) 20-12.5 mg per tablet TAKE 1 TABLET BY MOUTH TWICE A  Tablet 2    levalbuterol tartrate (XOPENEX) 45 mcg/actuation inhaler INHALE 1 TO 2 PUFFS BY MOUTH EVERY 4 TO 6 HOURS AS NEEDED 1 Inhaler 5    sodium chloride (Saline Nasal Mist) 0.65 % nasal squeeze bottle 0.05 mL by Both Nostrils route as needed for Congestion. 30 mL 0    omeprazole (PRILOSEC) 40 mg capsule Take 40 mg by mouth daily. cyclobenzaprine (FLEXERIL) 5 mg tablet TAKE 1-2 TABLETS BY MOUTH AT NIGHT. 90 Tab 1    YUVAFEM 10 mcg tab vaginal tablet INSERT 1 APPLICATORFUL VAGINALLY AT BEDTIME FOR 14 DAYS THEN TWICE PER WEEK  5    LACTOBACILLUS ACIDOPHILUS (PROBIOTIC PO) Take  by mouth. valACYclovir (VALTREX) 500 mg tablet Take 500 mg by mouth daily. 3    vitamin e 400 unit tab Take 1 Cap by mouth daily. Cholecalciferol, Vitamin D3, (VITAMIN D) 2,000 unit cap capsule Take 2,000 Units by mouth daily. 90 capsule 2    cpap machine kit by Does Not Apply route. Replace mask, headgear, heated hose, filters and accessories as needed for one year.    CPAP pressure: 9 cwp  Mask: standard Mirage FX plus Opus nasal pillows mask, or mask of choice  Homecare Company: [employer]  Download data 30 days after initiation of therapy 1 Kit 0       Allergies   Allergen Reactions    Bactrim [Sulfamethoprim Ds] Other (comments)     Made mouth feel thick, speech slurred       Past Surgical History:   Procedure Laterality Date    COLONOSCOPY N/A 6/25/2020    COLONOSCOPY with Polypectomies performed by Danielle Kurtz MD at [address]  2011    nipple reconstruction 2011    [address]  2018    HX HYSTERECTOMY  2003    HX MASTECTOMY Bilateral 2010    bilateral (Preventive)    HX OOPHORECTOMY  2010 Social History     Socioeconomic History    Marital status:      Spouse name: Not on file    Number of children: Not on file    Years of education: Not on file    Highest education level: Not on file   Occupational History    Not on file   Tobacco Use    Smoking status: Never    Smokeless tobacco: Never   Vaping Use    Vaping Use: Never used   Substance and Sexual Activity    Alcohol use: Not Currently     Comment: 30 years ago    Drug use: Never    Sexual activity: Yes     Partners: Male     Birth control/protection: Surgical   Other Topics Concern    Not on file   Social History Narrative    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 [employer]     Social Determinants of Health     Financial Resource Strain: Not on file   Food Insecurity: Not on file   Transportation Needs: Not on file   Physical Activity: Insufficiently Active    Days of Exercise per Week: 3 days    Minutes of Exercise per Session: 10 min   Stress: Not on file   Social Connections: Not on file   Intimate Partner Violence: Not At Risk    Fear of Current or Ex-Partner: No    Emotionally Abused: No    Physically Abused: No    Sexually Abused: No   Housing Stability: Not on file       REVIEW OF SYSTEMS:    14 point review of systems on the intake form is negative except as noted in the HPI    PHYSICAL EXAMINATION:  Visit Vitals  Pulse 98   Temp 98.6 °F (37 °C)   Resp 18   Ht 5' 5\" (1.651 m)   Wt 203 lb (92.1 kg)   LMP 06/16/2003   SpO2 99%   BMI 33.78 kg/m²     Body mass index is 33.78 kg/m². GENERAL: Alert and oriented x3, in no acute distress, well-developed, well-nourished. HEENT: Normocephalic, atraumatic. Right hip: negative log roll. Pain elicited in the low back by FABERE and FADIR. Neg stright leg raise. Neg trendelenberg sign/gate. Negative limp. TTP near PSIS into the buttocks. No lateral TTP. IMAGING:  Imaging read by myself and interpreted as follows:  3 view XR right hip shows preserved joint space of femeroacetablular joint.   SIJ has no obvious deformity. No other pathology noted on XR. ASSESSMENT & PLAN  Diagnosis: 56F with right sided sacroiliac joint pain. We discussed treatment options to include SIJ injection. She would like to do this so I will send her to Dr. Rossy Ring for evaluation. I also gave her a prescription for Mobic to help with inflammation. If she gets no relief with these, I will have her get a diagnostic right hip joint injection to rule it out as a source of pain. She may return PRN. Orders Placed This Encounter    AMB POC X-RAY RADEX HIP UNI WITH PELVIS 2-3 VIEWS     Order Specific Question:   Reason for Exam     Answer:   pain    [facility] [id number]     Referral Priority:   Routine     Referral Type:   PT/OT/ST     Referral Reason:   Specialty Services Required     Referred to Provider:   Camilla Kapadia MD     Number of Visits Requested:   1    meloxicam (Mobic) 15 mg tablet     Sig: Take 1 Tablet by mouth daily for 180 doses. Dispense:  90 Tablet     Refill:  1        Prescription medication management discussed. Electronically signed by: Dani Isaacs DO    Note: This note was completed using voice recognition software.   Any typographical/name errors or mistakes are unintentional.

## 2022-09-01 ENCOUNTER — OFFICE VISIT (OUTPATIENT)
Dept: INTERNAL MEDICINE CLINIC | Age: 57
End: 2022-09-01
Payer: COMMERCIAL

## 2022-09-01 VITALS
OXYGEN SATURATION: 98 % | HEART RATE: 65 BPM | TEMPERATURE: 97.5 F | RESPIRATION RATE: 18 BRPM | SYSTOLIC BLOOD PRESSURE: 118 MMHG | DIASTOLIC BLOOD PRESSURE: 75 MMHG | BODY MASS INDEX: 33.32 KG/M2 | WEIGHT: 200 LBS | HEIGHT: 65 IN

## 2022-09-01 DIAGNOSIS — M12.812 ROTATOR CUFF ARTHROPATHY OF LEFT SHOULDER: ICD-10-CM

## 2022-09-01 DIAGNOSIS — R73.03 PREDIABETES: ICD-10-CM

## 2022-09-01 DIAGNOSIS — I10 ESSENTIAL HYPERTENSION: Primary | ICD-10-CM

## 2022-09-01 DIAGNOSIS — E78.00 PURE HYPERCHOLESTEROLEMIA: ICD-10-CM

## 2022-09-01 PROCEDURE — 99214 OFFICE O/P EST MOD 30 MIN: CPT | Performed by: INTERNAL MEDICINE

## 2022-09-01 RX ORDER — CELECOXIB 100 MG/1
100 CAPSULE ORAL DAILY
Qty: 30 CAPSULE | Refills: 2 | Status: SHIPPED | OUTPATIENT
Start: 2022-09-01

## 2022-09-01 NOTE — PROGRESS NOTES
cl  Preoperative Evaluation    Date of Exam: 2022    Orly Riley is a 61 y.o. female (:1965) who presents for preoperative evaluation. Procedure/Surgery:Left Rotator Cuff repair  Date of Procedure/Surgery: 2022  Surgeon: Dr Patino Figures: [facility]    Primary Physician: Doroteo Solis MD    HPI: Patient presents for medical clearance for orthopedic surgery above. Patient has a history of hypertension that has been controlled on multiple medications including clonidine and Zestoretic HCT. The clonidine also helps with postmenopausal symptoms. Patient also has a history of high cholesterol that is controlled on Zocor 20 mg daily. She has prediabetes that is controlled currently with diet. Patient had a normal exercise cardiac stress test in 2021. Patient overall is at low risk for this surgery and is medically cleared to proceed    Medical History:     Past Medical History:   Diagnosis Date    Adverse effect of anesthesia     Hypothermia    Cardiomegaly     pt unaware    Diverticulosis     GERD (gastroesophageal reflux disease)     H/O tubal ligation     H/O: hysterectomy     Hiatal hernia     History of appendectomy     Hypercholesterolemia     Hypertension     Hypothermia due to anesthesia     During  Breast surgery    Obesity, unspecified     SUBHASH on CPAP 2013    S/P mastectomy, bilateral      : preventative due to [facility]    Undiagnosed cardiac murmurs     pt unaware    Unspecified adverse effect of anesthesia     was hypothermic in past     Allergies: Allergies   Allergen Reactions    Bactrim [Sulfamethoprim Ds] Other (comments)     Made mouth feel thick, speech slurred      Medications:     Current Outpatient Medications   Medication Sig    acetylcarnitine HCl (ACETYL L-CARNITINE PO) Take 1 Tablet by mouth daily. OTHER Take 1 Tablet by mouth daily. Black  seed 1250 mg daily.     celecoxib (CELEBREX) 100 mg capsule Take 1 Capsule by mouth daily. LACTULOSE PO Take  by mouth.    cloNIDine (CATAPRES) 0.1 mg/24 hr ptwk APPLY 1 PATCH TO SKIN AND CHANGE EVERY 7 DAYS    simvastatin (ZOCOR) 20 mg tablet TAKE 1 TABLET BY MOUTH EVERYDAY AT BEDTIME    fluticasone propionate (FLONASE) 50 mcg/actuation nasal spray 2 Sprays by Both Nostrils route daily. lisinopril-hydroCHLOROthiazide (PRINZIDE, ZESTORETIC) 20-12.5 mg per tablet TAKE 1 TABLET BY MOUTH TWICE A DAY    levalbuterol tartrate (XOPENEX) 45 mcg/actuation inhaler INHALE 1 TO 2 PUFFS BY MOUTH EVERY 4 TO 6 HOURS AS NEEDED    omeprazole (PRILOSEC) 40 mg capsule Take 40 mg by mouth daily. cyclobenzaprine (FLEXERIL) 5 mg tablet TAKE 1-2 TABLETS BY MOUTH AT NIGHT. YUVAFEM 10 mcg tab vaginal tablet INSERT 1 APPLICATORFUL VAGINALLY AT BEDTIME FOR 14 DAYS THEN TWICE PER WEEK    LACTOBACILLUS ACIDOPHILUS (PROBIOTIC PO) Take  by mouth. valACYclovir (VALTREX) 500 mg tablet Take 500 mg by mouth daily. Cholecalciferol, Vitamin D3, (VITAMIN D) 2,000 unit cap capsule Take 2,000 Units by mouth daily. cpap machine kit by Does Not Apply route. Replace mask, headgear, heated hose, filters and accessories as needed for one year. CPAP pressure: 9 cwp  Mask: standard Mirage FX plus Opus nasal pillows mask, or mask of choice  Home[employer]: [employer]  Download data 30 days after initiation of therapy    meloxicam (Mobic) 15 mg tablet Take 1 Tablet by mouth daily for 180 doses. (Patient not taking: Reported on 9/1/2022)    sodium chloride (Saline Nasal Mist) 0.65 % nasal squeeze bottle 0.05 mL by Both Nostrils route as needed for Congestion. (Patient not taking: Reported on 9/1/2022)    vitamin e 400 unit tab Take 1 Cap by mouth daily. (Patient not taking: Reported on 9/1/2022)     No current facility-administered medications for this visit.      Surgical History:     Past Surgical History:   Procedure Laterality Date    COLONOSCOPY N/A 6/25/2020    COLONOSCOPY with Polypectomies performed by Emile Sandhoff, MD at [address]  2011    nipple reconstruction 2011    HX CERVICAL FUSION  2018    HX HYSTERECTOMY  2003    HX MASTECTOMY Bilateral 2010    bilateral (Preventive)    HX OOPHORECTOMY  2010     Social History:     Social History     Socioeconomic History    Marital status:    Tobacco Use    Smoking status: Never    Smokeless tobacco: Never   Vaping Use    Vaping Use: Never used   Substance and Sexual Activity    Alcohol use: Not Currently     Comment: 30 years ago    Drug use: Never    Sexual activity: Yes     Partners: Male     Birth control/protection: Surgical   Social History Narrative    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 [employer]     Social Determinants of Health     Physical Activity: Insufficiently Active    Days of Exercise per Week: 3 days    Minutes of Exercise per Session: 10 min       Recent use of: NSAIDs    Anesthesia Complications: None  History of abnormal bleeding : None      REVIEW OF SYSTEMS:  Respiratory: negative for dyspnea on exertion  Cardiovascular: negative for chest pain    EXAM:   Visit Vitals  /75 (BP 1 Location: Left arm, BP Patient Position: Sitting, BP Cuff Size: Adult)   Pulse 65   Temp 97.5 °F (36.4 °C) (Temporal)   Resp 18   Ht 5' 5\" (1.651 m)   Wt 200 lb (90.7 kg)   LMP 06/16/2003   SpO2 98%   BMI 33.28 kg/m²     Chest - clear to auscultation, no wheezes, rales or rhonchi, symmetric air entry  Heart - normal rate, regular rhythm, normal S1, S2, no murmurs, rubs, clicks or gallops      DIAGNOSTICS:   Perop Testing reviewed and within normal limits. IMPRESSION:         ICD-10-CM ICD-9-CM    1. Essential hypertension  I10 401.9 Well-controlled on current medications. Patient to take Zestoretic a.m. of surgery with sip of water      2. Pure hypercholesterolemia  E78.00 272.0 Well-controlled on Zocor      3. Prediabetes  R73.03 790.29 Controlled with diet      4.  Rotator cuff arthropathy of left shoulder  M12.812 716.81 Patient medically cleared for orthopedic surgery above        Malinda Foster MD   9/1/2022

## 2022-09-01 NOTE — PROGRESS NOTES
Patient is in the office today for a pre op clearance for Dr. Shaneka Millan for rotator cuff repair on 9/30/2022 at [facility]. 1. \"Have you been to the ER, urgent care clinic since your last visit? Hospitalized since your last visit? \" No    2. \"Have you seen or consulted any other health care providers outside of the [address] [place] [address] since your last visit? \"  Yes, Dr. Ivan Camacho, and Dr. Shaneka Millan. 3. For patients aged 39-70: Has the patient had a colonoscopy / FIT/ Cologuard? Yes - no Care Gap present      If the patient is female:    4. For patients aged 41-77: Has the patient had a mammogram within the past 2 years? Yes - no Care Gap present      5. For patients aged 21-65: Has the patient had a pap smear?  Yes - no Care Gap present

## 2022-09-07 ENCOUNTER — HOSPITAL ENCOUNTER (OUTPATIENT)
Dept: ULTRASOUND IMAGING | Age: 57
Discharge: HOME | End: 2022-09-07
Attending: SPECIALIST
Payer: OTHER GOVERNMENT

## 2022-09-07 ENCOUNTER — HOSPITAL ENCOUNTER (OUTPATIENT)
Dept: MAMMOGRAPHY | Age: 57
Discharge: HOME | End: 2022-09-07
Attending: SPECIALIST
Payer: OTHER GOVERNMENT

## 2022-09-07 DIAGNOSIS — N63.25 MASS OVERLAPPING MULTIPLE QUADRANTS OF LEFT BREAST: ICD-10-CM

## 2022-09-07 DIAGNOSIS — Z80.3 FAMILY HISTORY OF BREAST CANCER: ICD-10-CM

## 2022-09-07 PROCEDURE — 76642 ULTRASOUND BREAST LIMITED: CPT

## 2022-09-07 PROCEDURE — 77061 BREAST TOMOSYNTHESIS UNI: CPT

## 2022-09-13 ENCOUNTER — TELEPHONE (OUTPATIENT)
Dept: INTERNAL MEDICINE CLINIC | Age: 57
End: 2022-09-13

## 2022-09-13 DIAGNOSIS — I10 ESSENTIAL HYPERTENSION: ICD-10-CM

## 2022-09-13 NOTE — TELEPHONE ENCOUNTER
[facility] SYS WASECA from [address] [place] [address] called stating pt had a pre op clearance on 09/01- and her surgery is not until 09/30/ office is asking if PCP could change the pre op for todays date 09/13- 22   Rosaline [phone]  Fax- [phone]

## 2022-09-13 NOTE — TELEPHONE ENCOUNTER
Spoke with Neela Dawn she is requesting for 9/1/2022 notes to be updated with today's date. When asked why Neela Dawn states anaesthesia wants today's date on pre op. Neela Dawn was advised that pre op's are good for 30 day prior to surgery. Neela Dawn was advised 9/1/2022 date is still within the 30 days. Neela Dawn asked if note could be faxed. Note faxed.

## 2022-09-14 RX ORDER — LISINOPRIL AND HYDROCHLOROTHIAZIDE 12.5; 2 MG/1; MG/1
TABLET ORAL
Qty: 180 TABLET | Refills: 2 | Status: SHIPPED | OUTPATIENT
Start: 2022-09-14

## 2022-10-31 ENCOUNTER — TELEPHONE (OUTPATIENT)
Dept: INTERNAL MEDICINE CLINIC | Age: 57
End: 2022-10-31

## 2022-10-31 NOTE — TELEPHONE ENCOUNTER
Stefano from [facility] called checking on the status of an order he faxed  he said to  [employer] for a Compression Pump ,

## 2022-10-31 NOTE — TELEPHONE ENCOUNTER
Stefano from [facility] reached, they will refax it, verified he had our correct fax number

## 2022-11-01 ENCOUNTER — TELEPHONE (OUTPATIENT)
Dept: INTERNAL MEDICINE CLINIC | Age: 57
End: 2022-11-01

## 2022-11-01 NOTE — TELEPHONE ENCOUNTER
----- Message from Nash Moorearmando sent at 10/31/2022  4:01 PM EDT -----  Subject: Message to Provider    QUESTIONS  Information for Provider? Rylee ayanna from [facility] called to see if   the fax was received for a lymphedema pump for her legs. Please fax back   to [phone]  ---------------------------------------------------------------------------  --------------  [address]  [phone]; Do not leave any message, patient will call back for answer  ---------------------------------------------------------------------------  --------------  SCRIPT ANSWERS  Relationship to Patient? Third Party  Third Party Type? Durable Medical Equipment? Representative Name?  [employer]

## 2022-11-01 NOTE — TELEPHONE ENCOUNTER
Spoke with [place] she stated her partner dropped of form yesterday at the office. [place] states she will refax form to office.

## 2022-11-09 ENCOUNTER — APPOINTMENT (OUTPATIENT)
Dept: INTERNAL MEDICINE CLINIC | Age: 57
End: 2022-11-09

## 2022-11-14 ENCOUNTER — OFFICE VISIT (OUTPATIENT)
Dept: INTERNAL MEDICINE CLINIC | Age: 57
End: 2022-11-14
Payer: COMMERCIAL

## 2022-11-14 VITALS
SYSTOLIC BLOOD PRESSURE: 132 MMHG | DIASTOLIC BLOOD PRESSURE: 81 MMHG | OXYGEN SATURATION: 98 % | TEMPERATURE: 97 F | BODY MASS INDEX: 32.99 KG/M2 | WEIGHT: 198 LBS | HEIGHT: 65 IN | RESPIRATION RATE: 18 BRPM | HEART RATE: 66 BPM

## 2022-11-14 DIAGNOSIS — I10 ESSENTIAL HYPERTENSION: Primary | ICD-10-CM

## 2022-11-14 DIAGNOSIS — E78.00 PURE HYPERCHOLESTEROLEMIA: ICD-10-CM

## 2022-11-14 DIAGNOSIS — R73.03 PREDIABETES: ICD-10-CM

## 2022-11-14 DIAGNOSIS — E55.9 VITAMIN D DEFICIENCY: ICD-10-CM

## 2022-11-14 DIAGNOSIS — Z23 ENCOUNTER FOR IMMUNIZATION: ICD-10-CM

## 2022-11-14 DIAGNOSIS — L03.011 PARONYCHIA OF RIGHT INDEX FINGER: ICD-10-CM

## 2022-11-14 PROCEDURE — 90686 IIV4 VACC NO PRSV 0.5 ML IM: CPT | Performed by: INTERNAL MEDICINE

## 2022-11-14 PROCEDURE — 99214 OFFICE O/P EST MOD 30 MIN: CPT | Performed by: INTERNAL MEDICINE

## 2022-11-14 PROCEDURE — 3074F SYST BP LT 130 MM HG: CPT | Performed by: INTERNAL MEDICINE

## 2022-11-14 PROCEDURE — 3078F DIAST BP <80 MM HG: CPT | Performed by: INTERNAL MEDICINE

## 2022-11-14 PROCEDURE — 90471 IMMUNIZATION ADMIN: CPT | Performed by: INTERNAL MEDICINE

## 2022-11-14 RX ORDER — CEPHALEXIN 500 MG/1
500 CAPSULE ORAL 4 TIMES DAILY
Qty: 28 CAPSULE | Refills: 0 | Status: SHIPPED | OUTPATIENT
Start: 2022-11-14 | End: 2022-11-21

## 2022-11-14 NOTE — PROGRESS NOTES
Subjective:       Chief Complaint  The patient presents for follow up of hypertension and high cholesterol. Vit D deficiency, prediabetes         KEDAR Cope is a 61 y.o. female seen for follow up of hyperlipidemia. Aiyana has hypertension. Hypertension  well controlled, no significant medication side effects noted, on catapres which is also for hot flashes and Zestoretic 20/12.5 BID,  hyperlipidemia fairly well controlled on Zocor 20 mg. She continues to struggle to lose weight by cutting back starches and snacks. No significant medication side effects noted, on Zocor 20 mg. She will continue to try to AIM for not eating more than 3x/day and more protein and fiber in her diet. She was given information medical weight loss program. She has mild prediabeties which she is trying to control with cutting back starches and sweets and losing weight. .  She was on Metformin at one time but not currently. .     Diet and Lifestyle: generally follows a low fat low cholesterol diet, does not rigorously follow a diabetic diet, exercises regularly    Home BP Monitoring: is not measured at home. Other symptoms and concerns: vit D level is fairly  well controlled on  vit D 5000 units/week    Patient has gastroesophageal reflux disease that is controlled on Prilosec 40 mg daily. Patient for the last week has had swelling around her right index finger cuticle. She has had tenderness to palpation and has noticed some pus coming from the cuticle. Ms. Jeyson Muhammad has a history of lymphedema in her abdomen, legs and feet that has been ongoing since she had prophylactic mastectomy in 2011 along with breast reconstruction surgery. She requires compression garments (pantyhose with open and close toe, high waist capris and knee-high socks). She also needs Flexi-touch lymphedema pump which she can use on a daily basis to control her lymphedema.      Current Outpatient Medications   Medication Sig    cephALEXin (KEFLEX) 500 mg capsule Take 1 Capsule by mouth four (4) times daily for 7 days. lisinopril-hydroCHLOROthiazide (PRINZIDE, ZESTORETIC) 20-12.5 mg per tablet TAKE 1 TABLET BY MOUTH TWICE A DAY    acetylcarnitine HCl (ACETYL L-CARNITINE PO) Take 1 Tablet by mouth daily. OTHER Take 1 Tablet by mouth daily. Black  seed 1250 mg daily. LACTULOSE PO Take  by mouth.    cloNIDine (CATAPRES) 0.1 mg/24 hr ptwk APPLY 1 PATCH TO SKIN AND CHANGE EVERY 7 DAYS    simvastatin (ZOCOR) 20 mg tablet TAKE 1 TABLET BY MOUTH EVERYDAY AT BEDTIME    fluticasone propionate (FLONASE) 50 mcg/actuation nasal spray 2 Sprays by Both Nostrils route daily. levalbuterol tartrate (XOPENEX) 45 mcg/actuation inhaler INHALE 1 TO 2 PUFFS BY MOUTH EVERY 4 TO 6 HOURS AS NEEDED    omeprazole (PRILOSEC) 40 mg capsule Take 40 mg by mouth daily. cyclobenzaprine (FLEXERIL) 5 mg tablet TAKE 1-2 TABLETS BY MOUTH AT NIGHT. YUVAFEM 10 mcg tab vaginal tablet INSERT 1 APPLICATORFUL VAGINALLY AT BEDTIME FOR 14 DAYS THEN TWICE PER WEEK    LACTOBACILLUS ACIDOPHILUS (PROBIOTIC PO) Take  by mouth. valACYclovir (VALTREX) 500 mg tablet Take 500 mg by mouth daily. Cholecalciferol, Vitamin D3, (VITAMIN D) 2,000 unit cap capsule Take 2,000 Units by mouth daily. cpap machine kit by Does Not Apply route. Replace mask, headgear, heated hose, filters and accessories as needed for one year. CPAP pressure: 9 cwp  Mask: standard Mirage FX plus Opus nasal pillows mask, or mask of choice  [employer]: [employer]  Download data 30 days after initiation of therapy     No current facility-administered medications for this visit.              Review of Systems  Respiratory: negative for dyspnea on exertion  Cardiovascular: negative for chest pain    Objective:     Visit Vitals  /81 (BP 1 Location: Right arm, BP Patient Position: Sitting, BP Cuff Size: Large adult)   Pulse 66   Temp 97 °F (36.1 °C) (Temporal)   Resp 18   Ht 5' 5\" (1.651 m)   Wt 198 lb (89.8 kg)   LMP 06/16/2003   SpO2 98%   BMI 32.95 kg/m²        General appearance - alert, well appearing, and in no distress  Chest - clear to auscultation, no wheezes, rales or rhonchi, symmetric air entry  Heart - normal rate, regular rhythm, normal S1, S2, no murmurs, rubs, clicks or gallops  Extremities - peripheral pulses normal, no pedal edema, no clubbing or cyanosis  Skin - normal coloration and turgor, no rashes, no suspicious skin lesions noted      Labs:   Lab Results   Component Value Date/Time    Cholesterol, total 177 11/10/2022 06:41 AM    HDL Cholesterol 51 11/10/2022 06:41 AM    LDL-CHOLESTEROL 109 (H) 11/10/2022 06:41 AM    LDL, calculated 101 (H) 08/12/2015 09:00 AM    LDL-C, External 99 06/28/2022 12:00 AM    Triglyceride 81 11/10/2022 06:41 AM    Cholesterol/HDL ratio 3.5 11/10/2022 06:41 AM     Lab Results   Component Value Date/Time    Sodium 138 11/10/2022 06:41 AM    Potassium 3.9 11/10/2022 06:41 AM    Chloride 104 11/10/2022 06:41 AM    CO2 25 11/10/2022 06:41 AM    Anion gap 5 10/18/2016 05:28 PM    Glucose 81 11/10/2022 06:41 AM    BUN 17 11/10/2022 06:41 AM    Creatinine 1.09 (H) 11/10/2022 06:41 AM    BUN/Creatinine ratio 16 11/10/2022 06:41 AM    GFR est AA 86 10/13/2021 01:42 PM    GFR est non-AA 74 10/13/2021 01:42 PM    Calcium 9.9 11/10/2022 06:41 AM    Bilirubin, total 0.4 11/10/2022 06:41 AM    ALT (SGPT) 31 (H) 11/10/2022 06:41 AM    Alk.  phosphatase 75 01/29/2020 09:48 AM    Protein, total 7.4 11/10/2022 06:41 AM    Albumin 4.3 11/10/2022 06:41 AM    Globulin 3.1 11/10/2022 06:41 AM    A-G Ratio 1.1 10/18/2016 05:28 PM        Labs:   Lab Results   Component Value Date/Time    WBC 3.3 (L) 07/18/2019 08:56 AM    HGB 13.5 07/18/2019 08:56 AM    HCT 40.8 07/18/2019 08:56 AM    PLATELET 905 49/27/2065 08:56 AM    MCV 93.2 07/18/2019 08:56 AM     Lab Results   Component Value Date/Time    Hemoglobin A1c 5.7 (H) 11/10/2022 06:41 AM    Hemoglobin A1c, External 5.8 06/28/2022 12:00 AM Assessment / Plan     Hypertension well controlled, on current meds. Hyperlipidemia borderline controlled, on Zocor 20 mg. Will try to improve it further with diet and weight loss. ICD-10-CM ICD-9-CM    1. Essential hypertension  G07 591.7 METABOLIC PANEL, COMPREHENSIVE      2. Pure hypercholesterolemia  E78.00 272.0 LIPID PANEL      3. Prediabetes  R73.03 790.29 Fairly well-controlled with diet currently. HEMOGLOBIN A1C W/O EAG      4. Paronychia of right index finger  L03.011 681.02 Will treat with cephALEXin (KEFLEX) 500 mg capsule 4x/day      5. Vitamin D deficiency  E55.9 268.9 Well-controlled vitamin D 5000 units/day VITAMIN D, 25 HYDROXY      6. Encounter for immunization  Z23 V03.89 INFLUENZA, FLUARIX, FLULAVAL, FLUZONE (AGE 6 MO+), AFLURIA(AGE 3Y+) IM, PF, 0.5 ML              HM: pt had bilateral prophylactic mastectomy and TRAM flap and no longer does screening mammograms           Low cholesterol diet, weight control and daily exercise discussed. Follow-up and Dispositions    Return in about 3 months (around 2/14/2023) for labs 1 week before. Reviewed plan of care. Patient has provided input and agrees with goals.

## 2022-11-14 NOTE — PROGRESS NOTES
Chief Complaint   Patient presents with    Hypertension         1. \"Have you been to the ER, urgent care clinic since your last visit? Hospitalized since your last visit? \" no    2. \"Have you seen or consulted any other health care providers outside of the [address] [place] [address] since your last visit? \" no    3. For patients aged 39-70: Has the patient had a colonoscopy? Yes, no care gap present    If the patient is female:    4. For patients aged 41-77: Has the patient had a mammogram within the past 2 years? Yes, no care gap present    5. For patients aged 21-65: Has the patient had a pap smear?  Yes, no care gap present

## 2022-12-22 ENCOUNTER — OFFICE VISIT (OUTPATIENT)
Dept: SURGERY | Age: 57
End: 2022-12-22
Payer: COMMERCIAL

## 2022-12-22 VITALS
HEART RATE: 72 BPM | BODY MASS INDEX: 32.49 KG/M2 | DIASTOLIC BLOOD PRESSURE: 82 MMHG | TEMPERATURE: 97 F | HEIGHT: 65 IN | WEIGHT: 195 LBS | SYSTOLIC BLOOD PRESSURE: 128 MMHG | RESPIRATION RATE: 16 BRPM | OXYGEN SATURATION: 99 %

## 2022-12-22 DIAGNOSIS — Z71.3 ENCOUNTER FOR WEIGHT LOSS COUNSELING: ICD-10-CM

## 2022-12-22 DIAGNOSIS — Z72.4 INAPPROPRIATE DIET OR EATING HABITS: ICD-10-CM

## 2022-12-22 DIAGNOSIS — E66.9 OBESITY (BMI 30-39.9): Primary | ICD-10-CM

## 2022-12-22 NOTE — PROGRESS NOTES
Weight Loss Progress Note: Initial Physician Visit      Jessica Walls is a 61 y.o. female with BMI 32.1 who is here for her Initial Evaluation for the medical weight loss medication program. Patient has done intermittent fasting and high protein low carb diet before, but has not been able to sustain any weight loss that she achieved with those methods. She wants to lose weight to be a healthier version of herself as she has a familial history of hypertension and diabetes. CC: Obesity    Weight History  Current weight 195lbs  Goal weight 155lbs   Highest weight 210lbs   (See weight gain time line scanned into media section of chart)    Food intolerances (gas, bloating, diarrhea, vomiting)? None      How many meals per day? 2 meals. Usual meal times? 8a, 1p-3p  Skipped meals? Yes. Which meals are skipped? Dinner. How often? Regular basis. Who cooks/makes your meals? Self and son. Who shops/buys your food? Self and son. Weight loss History  How many weight loss attempts have you had?  1-2 times. Which program were you most successful doing? High protein diet and intermittent fasting, but did not stick with IF as she states it made her more irritable and hungry. Significant Medical History    Have you ever taken appetite suppressants? No.   If yes: Rx or OTC? If yes; Any negative side effects? Ever diagnosed with sleep apnea or put on CPAP? Yes and wears CPAP nightly. Ever had bariatric surgery?: No.    Pregnant or planning on becoming pregnant w/in 6 months: No.        Significant Psychosocial History   Any history of drug abuse or dependence: No.  Current Major Lifestyle Changes: No.  Any potential unsupportive people: No.  Why are you starting a weight loss program now? Patient desires to live a healthier lifestyle and feel more comfortable in her clothes. Are you ready? Yes.     History of binge eating disorder or anorexia: No.  If yes, are you currently being treated? Social History  Social History     Tobacco Use    Smoking status: Never    Smokeless tobacco: Never   Substance Use Topics    Alcohol use: Not Currently     Comment: 30 years ago     How many times a week do you eat out? Once every other week. Do you drink any EtOH? No.   If so, how much? N/A    Do you have upcoming any travel in the next 6 weeks? No.   If so, what do you have planned? N/A      Exercise  How many days a week do you currently exercise? Walking 3 miles x 3 days a week. Have you ever been told by a physician not to exercise? No.        Objective  Visit Vitals  /82 (BP Patient Position: Sitting)   Pulse 72   Temp 97 °F (36.1 °C) (Temporal)   Resp 16   Ht 5' 5\" (1.651 m)   Wt 195 lb (88.5 kg)   LMP 06/16/2003   SpO2 99%   BMI 32.45 kg/m²       Waist Circumference: 43in  Arm Circumference: 13in  Thigh Circumference: 19.5in  Percent Body Fat: 39.5%  Weight Metrics 12/22/2022 11/14/2022 9/1/2022 8/22/2022 7/26/2022 7/11/2022 3/1/2022   Weight 195 lb 198 lb 200 lb 203 lb 204 lb 3.2 oz 203 lb 204 lb   Waist Measure Inches - - - - - - -   Exercise Mins/week - - - - - - -   Body Fat % - - - - - - -   BMI 32.45 kg/m2 32.95 kg/m2 33.28 kg/m2 33.78 kg/m2 33.98 kg/m2 33.78 kg/m2 33.95 kg/m2         Labs: None    Review of Systems  Review of Systems   Constitutional: Negative. HENT: Negative. Eyes: Negative. Respiratory: Negative. Cardiovascular: Negative. Gastrointestinal: Negative. Genitourinary: Negative. Musculoskeletal: Negative. Skin: Negative. Neurological: Negative. Endo/Heme/Allergies: Negative. Psychiatric/Behavioral: Negative. Physical Exam  Physical Exam  Constitutional:       Appearance: She is obese. HENT:      Head: Normocephalic and atraumatic. Mouth/Throat:      Mouth: Mucous membranes are moist.   Eyes:      Extraocular Movements: Extraocular movements intact. Pupils: Pupils are equal, round, and reactive to light. Cardiovascular:      Rate and Rhythm: Normal rate and regular rhythm. Pulses: Normal pulses. Heart sounds: Normal heart sounds. Pulmonary:      Effort: Pulmonary effort is normal.      Breath sounds: Normal breath sounds. Abdominal:      Palpations: Abdomen is soft. Musculoskeletal:         General: Normal range of motion. Cervical back: Normal range of motion and neck supple. Skin:     General: Skin is warm and dry. Neurological:      General: No focal deficit present. Mental Status: She is alert and oriented to person, place, and time. Psychiatric:         Mood and Affect: Mood normal.         Behavior: Behavior normal.          Assessment & Plan  Based on his history and exam, Eduardo Wallace is a good candidate for the Non-[facility] Weight Loss medication Program. Patient has done intermittent fasting and calorie counting in the past for weight loss and it was not sustainable so she would like to try medications at this time. Dietary recall reflects that a typical breakfast for the patient consists of a sausage link and avocado, or avocado toast, or egg, santiago, and pineapple. Lunch and dinner typically consist of rice and beans, a protein and she will drink kumbucha or green tea with that meal. She endorses that she consumes approximately 48oz of water daily along with green tea and a half a bottle of Starbucks cappuccino. She denies consuming any alcohol, or sweet tea and states she will drink one diet  per month. Patient states that she is a lover of chocolate candy and will eat chocolate at least once per day. 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 Discussed with patient that Dennie Blue is a good fit for her as it will help modulate her hunger drive.  Explained the mechanism of action of both the buproprion and naltrexone in Contrave along with titration of the medication. Advised patient that in addition to the medication that she would benefit from observing a high protein, high vegetable diet and continues to restrict carbohydrates,starches, and sugars. Encouraged her to continue to consume at least 64oz of water daily which can be flavored and to track her steps while at work with the goal to target between 6k-10k steps daily. Diet Plan: High protein, vegetable low/no carbs, starches, or sugars      Activity Plan: 6k-10k steps daily    Medication Plan: Contrave      There are no Patient Instructions on file for this visit. There were no encounter diagnoses.         TANNER De La Vega

## 2022-12-28 DIAGNOSIS — E66.9 OBESITY (BMI 30-39.9): Primary | ICD-10-CM

## 2022-12-28 NOTE — PROGRESS NOTES
Patient sent message requesting an EKG so that she can do phentermine instead of her initial choice of Contrave. EKG ordered and will order phentermine once EKG comes back with no issues.

## 2022-12-30 ENCOUNTER — HOSPITAL ENCOUNTER (OUTPATIENT)
Dept: LAB | Age: 57
Discharge: HOME | End: 2022-12-30
Payer: COMMERCIAL

## 2022-12-30 DIAGNOSIS — E66.9 OBESITY (BMI 30-39.9): ICD-10-CM

## 2022-12-30 PROCEDURE — 93005 ELECTROCARDIOGRAM TRACING: CPT

## 2022-12-31 LAB
ATRIAL RATE: 71 BPM
CALCULATED P AXIS, ECG09: 23 DEGREES
CALCULATED R AXIS, ECG10: -4 DEGREES
CALCULATED T AXIS, ECG11: 13 DEGREES
DIAGNOSIS, 93000: NORMAL
P-R INTERVAL, ECG05: 164 MS
Q-T INTERVAL, ECG07: 408 MS
QRS DURATION, ECG06: 84 MS
QTC CALCULATION (BEZET), ECG08: 443 MS
VENTRICULAR RATE, ECG03: 71 BPM

## 2023-01-05 DIAGNOSIS — E66.9 OBESITY (BMI 30-39.9): Primary | ICD-10-CM

## 2023-01-05 RX ORDER — PHENTERMINE HYDROCHLORIDE 37.5 MG/1
37.5 TABLET ORAL
Qty: 90 TABLET | Refills: 0 | Status: SHIPPED | OUTPATIENT
Start: 2023-01-05 | End: 2023-01-05

## 2023-01-10 RX ORDER — CELECOXIB 100 MG/1
CAPSULE ORAL
Qty: 30 CAPSULE | Refills: 2 | Status: SHIPPED | OUTPATIENT
Start: 2023-01-10

## 2023-02-04 DIAGNOSIS — I10 ESSENTIAL HYPERTENSION: Primary | ICD-10-CM

## 2023-02-05 DIAGNOSIS — R73.03 PREDIABETES: Primary | ICD-10-CM

## 2023-02-05 DIAGNOSIS — E55.9 VITAMIN D DEFICIENCY: Primary | ICD-10-CM

## 2023-02-06 DIAGNOSIS — E78.00 PURE HYPERCHOLESTEROLEMIA: Primary | ICD-10-CM

## 2023-02-21 RX ORDER — FLUTICASONE PROPIONATE 50 MCG
2 SPRAY, SUSPENSION (ML) NASAL DAILY
Qty: 16 G | Status: CANCELLED | OUTPATIENT
Start: 2023-02-21

## 2023-02-22 RX ORDER — FLUTICASONE PROPIONATE 50 MCG
2 SPRAY, SUSPENSION (ML) NASAL DAILY
Qty: 16 G | Refills: 3 | Status: SHIPPED | OUTPATIENT
Start: 2023-02-22

## 2023-02-23 ENCOUNTER — PATIENT MESSAGE (OUTPATIENT)
Age: 58
End: 2023-02-23

## 2023-02-23 RX ORDER — AMLODIPINE BESYLATE 5 MG/1
5 TABLET ORAL DAILY
Qty: 90 TABLET | Refills: 3 | Status: SHIPPED | OUTPATIENT
Start: 2023-02-23

## 2023-02-23 NOTE — TELEPHONE ENCOUNTER
From: Virginia Archer  To: Dr. Anthony Arshad: 2/23/2023 7:54 AM EST  Subject: Kidney Function Low    Good morning,     I am very concerned about the results of my kidney function labs and Creatine. Please call me when you receive this message.   [phone].     Respectfully,     Amrit Lawler

## 2023-02-23 NOTE — TELEPHONE ENCOUNTER
-150/80-90 at home which is not optimal. Will add Norvasc 5 mg to current meds and repeat BMP at [facility] next week.

## 2023-02-27 ENCOUNTER — OFFICE VISIT (OUTPATIENT)
Age: 58
End: 2023-02-27
Payer: COMMERCIAL

## 2023-02-27 ENCOUNTER — HOSPITAL ENCOUNTER (OUTPATIENT)
Facility: HOSPITAL | Age: 58
Discharge: HOME | End: 2023-03-02

## 2023-02-27 VITALS
TEMPERATURE: 98.6 F | HEART RATE: 63 BPM | HEIGHT: 65 IN | SYSTOLIC BLOOD PRESSURE: 131 MMHG | RESPIRATION RATE: 20 BRPM | OXYGEN SATURATION: 97 % | BODY MASS INDEX: 31.65 KG/M2 | WEIGHT: 190 LBS | DIASTOLIC BLOOD PRESSURE: 78 MMHG

## 2023-02-27 DIAGNOSIS — E78.00 PURE HYPERCHOLESTEROLEMIA, UNSPECIFIED: ICD-10-CM

## 2023-02-27 DIAGNOSIS — N18.31 STAGE 3A CHRONIC KIDNEY DISEASE (HCC): ICD-10-CM

## 2023-02-27 DIAGNOSIS — I10 ESSENTIAL (PRIMARY) HYPERTENSION: Primary | ICD-10-CM

## 2023-02-27 DIAGNOSIS — R73.03 PREDIABETES: ICD-10-CM

## 2023-02-27 DIAGNOSIS — E55.9 VITAMIN D DEFICIENCY: ICD-10-CM

## 2023-02-27 LAB — LABCORP SPECIMEN COLLECTION: NORMAL

## 2023-02-27 PROCEDURE — 3074F SYST BP LT 130 MM HG: CPT | Performed by: INTERNAL MEDICINE

## 2023-02-27 PROCEDURE — 3078F DIAST BP <80 MM HG: CPT | Performed by: INTERNAL MEDICINE

## 2023-02-27 PROCEDURE — 99001 SPECIMEN HANDLING PT-LAB: CPT

## 2023-02-27 PROCEDURE — 99214 OFFICE O/P EST MOD 30 MIN: CPT | Performed by: INTERNAL MEDICINE

## 2023-02-27 RX ORDER — SIMVASTATIN 20 MG
TABLET ORAL
Qty: 90 TABLET | Refills: 2 | Status: SHIPPED | OUTPATIENT
Start: 2023-02-27

## 2023-02-27 SDOH — ECONOMIC STABILITY: HOUSING INSECURITY
IN THE LAST 12 MONTHS, WAS THERE A TIME WHEN YOU DID NOT HAVE A STEADY PLACE TO SLEEP OR SLEPT IN A SHELTER (INCLUDING NOW)?: NO

## 2023-02-27 SDOH — ECONOMIC STABILITY: INCOME INSECURITY: HOW HARD IS IT FOR YOU TO PAY FOR THE VERY BASICS LIKE FOOD, HOUSING, MEDICAL CARE, AND HEATING?: NOT HARD AT ALL

## 2023-02-27 SDOH — ECONOMIC STABILITY: FOOD INSECURITY: WITHIN THE PAST 12 MONTHS, YOU WORRIED THAT YOUR FOOD WOULD RUN OUT BEFORE YOU GOT MONEY TO BUY MORE.: NEVER TRUE

## 2023-02-27 SDOH — ECONOMIC STABILITY: FOOD INSECURITY: WITHIN THE PAST 12 MONTHS, THE FOOD YOU BOUGHT JUST DIDN'T LAST AND YOU DIDN'T HAVE MONEY TO GET MORE.: NEVER TRUE

## 2023-02-27 ASSESSMENT — PATIENT HEALTH QUESTIONNAIRE - PHQ9
SUM OF ALL RESPONSES TO PHQ QUESTIONS 1-9: 0
SUM OF ALL RESPONSES TO PHQ QUESTIONS 1-9: 0
1. LITTLE INTEREST OR PLEASURE IN DOING THINGS: 0
SUM OF ALL RESPONSES TO PHQ9 QUESTIONS 1 & 2: 0
2. FEELING DOWN, DEPRESSED OR HOPELESS: 0
SUM OF ALL RESPONSES TO PHQ QUESTIONS 1-9: 0
SUM OF ALL RESPONSES TO PHQ QUESTIONS 1-9: 0

## 2023-02-28 ENCOUNTER — TELEPHONE (OUTPATIENT)
Age: 58
End: 2023-02-28

## 2023-02-28 LAB
BUN SERPL-MCNC: 19 MG/DL (ref 6–24)
BUN/CREAT SERPL: 17 (ref 9–23)
CHLORIDE SERPL-SCNC: 99 MMOL/L (ref 96–106)
CO2 SERPL-SCNC: 21 MMOL/L (ref 20–29)
CREAT SERPL-MCNC: 1.1 MG/DL (ref 0.57–1)
EGFRCR SERPLBLD CKD-EPI 2021: 58 ML/MIN/1.73
GLUCOSE SERPL-MCNC: 86 MG/DL (ref 70–99)
POTASSIUM SERPL-SCNC: 4.4 MMOL/L (ref 3.5–5.2)
REPORT: NORMAL
SODIUM SERPL-SCNC: 141 MMOL/L (ref 134–144)

## 2023-02-28 NOTE — TELEPHONE ENCOUNTER
----- Message from [address] [employer] sent at 2/28/2023 10:56 AM EST -----  Regarding: Lab Results   Hello,  Please provide me with the BMP results. I had the test done yesterday morning. Take you in advance.      [place]

## 2023-03-01 ENCOUNTER — TELEPHONE (OUTPATIENT)
Age: 58
End: 2023-03-01

## 2023-03-01 NOTE — TELEPHONE ENCOUNTER
----- Message from [address]. Glo Belcher sent at 2/28/2023  4:41 PM EST -----  Regarding: Renal function  Thank you. B/P today was 127/77 this morning and this evening.       Dana Stratton

## 2023-03-04 NOTE — PROGRESS NOTES
Subjective:       Chief Complaint  The patient presents for follow up of hypertension and high cholesterol. Vit D deficiency, prediabetes         HPI  Lucila Franco is a 61 y.o. female seen for follow up of hyperlipidemia. Shealso has hypertension. Hypertension  better controlled now that Norvasc 5 mg was added a few days ago, no significant medication side effects noted, on catapres which is also for hot flashes and Zestoretic 20/12.5 BID, patient has chronic kidney disease stage IIIa which is mildly progressed according to current labs but may be lab error so we will repeat BMP today, this is the reason that Norvasc was added to get her blood pressure under better control due to stop further progression of her kidney disease, we will go ahead and refer to renal at patient's request, hyperlipidemia borderline controlled on Zocor 20 mg. Patient's LDL is 106. She continues to struggle to lose weight by cutting back starches and snacks. No significant medication side effects noted, on Zocor 20 mg. She will continue to try to AIM for not eating more than 3x/day and more protein and fiber in her diet. She was given information medical weight loss program. She has mild prediabeties with hemoglobin A1c 5.8 which she is trying to control with cutting back starches and sweets and losing weight. .  She was on Metformin at one time but not currently. .     Diet and Lifestyle: generally follows a low fat low cholesterol diet, does not rigorously follow a diabetic diet, exercises regularly    Home BP Monitoring: is not measured at home. Other symptoms and concerns: vit D level is fairly  well controlled on  vit D 5000 units/week    Patient has gastroesophageal reflux disease that is controlled on Prilosec 40 mg daily. Patient has sleep apnea and is doing well on CPAP machine.     Ms. Merly Robins has a history of lymphedema in her abdomen, legs and feet that has been ongoing since she had prophylactic mastectomy in 2011 along with breast reconstruction surgery. She requires compression garments (pantyhose with open and close toe, high waist capris and knee-high socks). She also needs Flexi-touch lymphedema pump which she can use on a daily basis to control her lymphedema. Current Outpatient Medications   Medication Sig    simvastatin (ZOCOR) 20 MG tablet TAKE 1 TABLET BY MOUTH EVERYDAY AT BEDTIME    amLODIPine (NORVASC) 5 MG tablet Take 1 tablet by mouth daily    fluticasone (FLONASE) 50 MCG/ACT nasal spray 2 sprays by Nasal route daily    LACTULOSE PO Take by mouth    Cholecalciferol 50 MCG (2000 UT) CAPS Take 2,000 Units by mouth daily    cloNIDine (CATAPRES) 0.1 MG/24HR PTWK APPLY 1 PATCH TO SKIN AND CHANGE EVERY 7 DAYS    cyclobenzaprine (FLEXERIL) 5 MG tablet TAKE 1-2 TABLETS BY MOUTH AT NIGHT. Estradiol (VAGIFEM) 10 MCG TABS vaginal tablet INSERT 1 APPLICATORFUL VAGINALLY AT BEDTIME FOR 14 DAYS THEN TWICE PER WEEK    levalbuterol (XOPENEX HFA) 45 MCG/ACT inhaler INHALE 1 TO 2 PUFFS BY MOUTH EVERY 4 TO 6 HOURS AS NEEDED    lisinopril-hydroCHLOROthiazide (PRINZIDE;ZESTORETIC) 20-12.5 MG per tablet TAKE 1 TABLET BY MOUTH TWICE A DAY    omeprazole (PRILOSEC) 40 MG delayed release capsule Take 40 mg by mouth daily    valACYclovir (VALTREX) 500 MG tablet Take 500 mg by mouth daily    naltrexone-buPROPion (CONTRAVE) 8-90 MG per extended release tablet Week 1 1 tab PO QAM, Week 2 1QAM 1QHS, Week 3 2QAM 1 QHS, Week 4 & beyond 2QAM 2QHS (Patient not taking: Reported on 2/27/2023)     No current facility-administered medications for this visit.                  Review of Systems  Respiratory: negative for dyspnea on exertion  Cardiovascular: negative for chest pain    Objective:     Visit Vitals  /78 (Site: Right Upper Arm, Position: Sitting, Cuff Size: Large Adult)   Pulse 63   Temp 98.6 °F (37 °C) (Temporal)   Resp 20   Ht 5' 5\" (1.651 m)   Wt 190 lb (86.2 kg)   SpO2 97%   BMI 31.62 kg/m²         General appearance - alert, well appearing, and in no distress  Chest - clear to auscultation, no wheezes, rales or rhonchi, symmetric air entry  Heart - normal rate, regular rhythm, normal S1, S2, no murmurs, rubs, clicks or gallops  Extremities - peripheral pulses normal, no pedal edema, no clubbing or cyanosis  Skin - normal coloration and turgor, no rashes, no suspicious skin lesions noted    CMP:    Lab Results   Component Value Date/Time     11/10/2022 06:41 AM    K 3.9 11/10/2022 06:41 AM     11/10/2022 06:41 AM    CO2 25 11/10/2022 06:41 AM    BUN 17 11/10/2022 06:41 AM    CREATININE 1.09 11/10/2022 06:41 AM    GFRAA 86 10/13/2021 01:42 PM    LABGLOM 59 11/10/2022 06:41 AM    GLUCOSE 81 11/10/2022 06:41 AM    PROT 7.4 11/10/2022 06:41 AM    LABALBU 4.3 11/10/2022 06:41 AM    CALCIUM 9.9 11/10/2022 06:41 AM    BILITOT 0.4 11/10/2022 06:41 AM    ALKPHOS 69 11/10/2022 06:41 AM    AST 27 11/10/2022 06:41 AM    ALT 31 11/10/2022 06:41 AM     HgBA1c:    Lab Results   Component Value Date/Time    LABA1C 5.7 11/10/2022 06:41 AM     FLP:    Lab Results   Component Value Date/Time    TRIG 81 11/10/2022 06:41 AM    HDL 51 11/10/2022 06:41 AM       Assessment / Plan     Hypertension well controlled, on current meds. Hyperlipidemia borderline controlled, on Zocor 20 mg. Will try to improve it further with diet and weight loss. Aim is LDL <70      ICD-10-CM    1. Essential (primary) hypertension  I10 Comprehensive Metabolic Panel      2. Pure hypercholesterolemia, unspecified  E78.00 Lipid Panel      3. Prediabetes  R73.03  will try improve further with diet and weight loss Hemoglobin A1C      4. Stage 3a chronic kidney disease (HCC)  N18.31 Stable with repeat BMP done today external Referral To Nephrology per patient request     Basic Metabolic Panel      5.  Vitamin D deficiency  E55.9 Well-controlled vitamin D 5000 units/day Vitamin D 25 Hydroxy               HM: pt had bilateral prophylactic mastectomy and TRAM flap and no longer does screening mammograms           Low cholesterol diet, weight control and daily exercise discussed. Return in about 3 months (around 5/27/2023) for labs 1 week before. Reviewed plan of care. Patient has provided input and agrees with goals.

## 2023-05-17 NOTE — TELEPHONE ENCOUNTER
Scheduled [facility] came by and dropped off prescription form, requesting form Dr. Jennifer Solorzano to sign. Form placed in box.

## 2023-05-23 RX ORDER — CLONIDINE 0.1 MG/24H
PATCH, EXTENDED RELEASE TRANSDERMAL
Qty: 12 PATCH | Refills: 3 | Status: SHIPPED | OUTPATIENT
Start: 2023-05-23

## 2023-05-31 ENCOUNTER — TELEPHONE (OUTPATIENT)
Age: 58
End: 2023-05-31

## 2023-06-01 LAB
25(OH)D3 SERPL-MCNC: 51 NG/ML (ref 30–100)
ALB/GLOBRATIO, 58C: 1.5 (CALC) (ref 1–2.5)
ALBUMIN SERPL-MCNC: 4.6 G/DL (ref 3.6–5.1)
ALKALINE PHOSPHATASE, TOTAL, 25002000: 61 U/L (ref 37–153)
ALT SERPL-CCNC: 23 U/L (ref 6–29)
AST SERPL W P-5'-P-CCNC: 21 U/L (ref 10–35)
BILIRUB SERPL-MCNC: 0.5 MG/DL (ref 0.2–1.2)
BUN SERPL-MCNC: 17 MG/DL (ref 7–25)
BUN/CREATININE RATIO,BUCR: NORMAL (CALC) (ref 6–22)
CALCIUM SERPL-MCNC: 10.3 MG/DL (ref 8.6–10.4)
CHLORIDE SERPL-SCNC: 102 MMOL/L (ref 98–110)
CHOL/HDL RATIO,CHHDX: 3.2 (CALC)
CHOLEST SERPL-MCNC: 180 MG/DL
CO2 SERPL-SCNC: 28 MMOL/L (ref 20–32)
CREAT SERPL-MCNC: 1.03 MG/DL (ref 0.5–1.03)
EGFR: 63 ML/MIN/1.73M2
GLOBULIN,GLOB: 3.1 G/DL (CALC) (ref 1.9–3.7)
GLUCOSE SERPL-MCNC: 89 MG/DL (ref 65–99)
HBA1C MFR BLD HPLC: 5.5 % OF TOTAL HGB
HDLC SERPL-MCNC: 57 MG/DL
LDL-CHOLESTEROL: 104 MG/DL (CALC)
NON-HDL CHOLESTEROL, 011976: 123 MG/DL (CALC)
POTASSIUM SERPL-SCNC: 4 MMOL/L (ref 3.5–5.3)
PROT SERPL-MCNC: 7.7 G/DL (ref 6.1–8.1)
SODIUM SERPL-SCNC: 140 MMOL/L (ref 135–146)
TRIGL SERPL-MCNC: 96 MG/DL (ref ?–150)

## 2023-06-05 ENCOUNTER — OFFICE VISIT (OUTPATIENT)
Age: 58
End: 2023-06-05
Payer: COMMERCIAL

## 2023-06-05 VITALS
RESPIRATION RATE: 20 BRPM | DIASTOLIC BLOOD PRESSURE: 84 MMHG | BODY MASS INDEX: 33.32 KG/M2 | HEART RATE: 65 BPM | TEMPERATURE: 98.7 F | OXYGEN SATURATION: 98 % | HEIGHT: 65 IN | WEIGHT: 200 LBS | SYSTOLIC BLOOD PRESSURE: 131 MMHG

## 2023-06-05 DIAGNOSIS — E78.00 PURE HYPERCHOLESTEROLEMIA, UNSPECIFIED: ICD-10-CM

## 2023-06-05 DIAGNOSIS — N18.31 STAGE 3A CHRONIC KIDNEY DISEASE (HCC): ICD-10-CM

## 2023-06-05 DIAGNOSIS — E55.9 VITAMIN D DEFICIENCY: ICD-10-CM

## 2023-06-05 DIAGNOSIS — R73.03 PREDIABETES: ICD-10-CM

## 2023-06-05 DIAGNOSIS — I10 ESSENTIAL (PRIMARY) HYPERTENSION: Primary | ICD-10-CM

## 2023-06-05 DIAGNOSIS — E66.09 CLASS 1 OBESITY DUE TO EXCESS CALORIES WITH SERIOUS COMORBIDITY AND BODY MASS INDEX (BMI) OF 33.0 TO 33.9 IN ADULT: ICD-10-CM

## 2023-06-05 PROCEDURE — 99214 OFFICE O/P EST MOD 30 MIN: CPT | Performed by: INTERNAL MEDICINE

## 2023-06-05 PROCEDURE — 3075F SYST BP GE 130 - 139MM HG: CPT | Performed by: INTERNAL MEDICINE

## 2023-06-05 PROCEDURE — 3079F DIAST BP 80-89 MM HG: CPT | Performed by: INTERNAL MEDICINE

## 2023-06-06 NOTE — PROGRESS NOTES
Ivonne Cordon presents today for   Chief Complaint   Patient presents with    Hypertension    Cholesterol Problem    Blood Sugar Problem     3 month follow up      1. \"Have you been to the ER, urgent care clinic since your last visit? Hospitalized since your last visit? \" no    2. \"Have you seen or consulted any other health care providers outside of the [address] [place] [address] since your last visit? \" no     3. For patients aged 39-70: Has the patient had a colonoscopy / FIT/ Cologuard? Yes - no Care Gap present      If the patient is female:    4. For patients aged 41-77: Has the patient had a mammogram within the past 2 years? Yes - no Care Gap present      5. For patients aged 21-65: Has the patient had a pap smear?  Yes - no Care Gap present
provided input and agrees with goals.

## 2023-06-07 ENCOUNTER — TELEPHONE (OUTPATIENT)
Age: 58
End: 2023-06-07

## 2023-06-26 ENCOUNTER — TELEPHONE (OUTPATIENT)
Age: 58
End: 2023-06-26

## 2023-06-28 ENCOUNTER — TELEPHONE (OUTPATIENT)
Age: 58
End: 2023-06-28

## 2023-07-06 RX ORDER — OMEPRAZOLE 40 MG/1
CAPSULE, DELAYED RELEASE ORAL
Qty: 90 CAPSULE | Refills: 2 | Status: SHIPPED | OUTPATIENT
Start: 2023-07-06

## 2023-07-06 NOTE — TELEPHONE ENCOUNTER
Please refill if appropriate or refuse medication if not appropriate. PCP: Efrem Mc MD     Last appt: 6/5/23    Last refill: ?       Future Appointments   Date Time Provider [address] [place] [address]   10/6/2023  8:40 AM Efrem Mc MD [facility] AMB         Requested Prescriptions     Pending Prescriptions Disp Refills    omeprazole (PRILOSEC) 40 MG delayed release capsule [Pharmacy Med Name: OMEPRAZOLE DR 40 MG CAPSULE] 90 capsule 2     Sig: TAKE 1 CAPSULE BY MOUTH EVERY DAY

## 2023-07-17 ENCOUNTER — TELEPHONE (OUTPATIENT)
Age: 58
End: 2023-07-17

## 2023-07-17 ENCOUNTER — OFFICE VISIT (OUTPATIENT)
Age: 58
End: 2023-07-17
Payer: COMMERCIAL

## 2023-07-17 VITALS
HEIGHT: 65 IN | WEIGHT: 197 LBS | HEART RATE: 60 BPM | RESPIRATION RATE: 18 BRPM | SYSTOLIC BLOOD PRESSURE: 119 MMHG | OXYGEN SATURATION: 96 % | DIASTOLIC BLOOD PRESSURE: 74 MMHG | TEMPERATURE: 97.9 F | BODY MASS INDEX: 32.82 KG/M2

## 2023-07-17 DIAGNOSIS — S39.012A BACK STRAIN, INITIAL ENCOUNTER: ICD-10-CM

## 2023-07-17 DIAGNOSIS — S16.1XXA STRAIN OF NECK MUSCLE, INITIAL ENCOUNTER: Primary | ICD-10-CM

## 2023-07-17 DIAGNOSIS — S06.0XAA CONCUSSION WITH UNKNOWN LOSS OF CONSCIOUSNESS STATUS, INITIAL ENCOUNTER: ICD-10-CM

## 2023-07-17 PROCEDURE — 3078F DIAST BP <80 MM HG: CPT | Performed by: INTERNAL MEDICINE

## 2023-07-17 PROCEDURE — 3074F SYST BP LT 130 MM HG: CPT | Performed by: INTERNAL MEDICINE

## 2023-07-17 PROCEDURE — 99214 OFFICE O/P EST MOD 30 MIN: CPT | Performed by: INTERNAL MEDICINE
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
## 2023-07-19 DIAGNOSIS — I10 ESSENTIAL (PRIMARY) HYPERTENSION: ICD-10-CM

## 2023-07-20 RX ORDER — LISINOPRIL AND HYDROCHLOROTHIAZIDE 20; 12.5 MG/1; MG/1
TABLET ORAL
Qty: 180 TABLET | Refills: 2 | Status: SHIPPED | OUTPATIENT
Start: 2023-07-20

## 2023-07-21 ASSESSMENT — ENCOUNTER SYMPTOMS: BACK PAIN: 1
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
## 2023-08-01 ENCOUNTER — OFFICE VISIT (OUTPATIENT)
Age: 58
End: 2023-08-01

## 2023-08-01 VITALS — WEIGHT: 198 LBS | BODY MASS INDEX: 32.95 KG/M2

## 2023-08-01 DIAGNOSIS — M99.02 THORACIC REGION SOMATIC DYSFUNCTION: ICD-10-CM

## 2023-08-01 DIAGNOSIS — M99.06 LOWER LIMB REGION SOMATIC DYSFUNCTION: ICD-10-CM
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
## 2023-08-01 DIAGNOSIS — M99.01 CERVICAL SOMATIC DYSFUNCTION: ICD-10-CM

## 2023-08-01 DIAGNOSIS — M48.061 FORAMINAL STENOSIS OF LUMBAR REGION: ICD-10-CM

## 2023-08-01 DIAGNOSIS — M51.36 DDD (DEGENERATIVE DISC DISEASE), LUMBAR: ICD-10-CM

## 2023-08-01 DIAGNOSIS — M99.04 SACRAL REGION SOMATIC DYSFUNCTION: ICD-10-CM

## 2023-08-01 DIAGNOSIS — M43.16 SPONDYLOLISTHESIS AT L4-L5 LEVEL: ICD-10-CM

## 2023-08-01 DIAGNOSIS — M99.09 SOMATIC DYSFUNCTION OF ABDOMINAL REGION: ICD-10-CM

## 2023-08-01 DIAGNOSIS — S06.0X0A CONCUSSION WITHOUT LOSS OF CONSCIOUSNESS, INITIAL ENCOUNTER: Primary | ICD-10-CM

## 2023-08-01 DIAGNOSIS — M99.08 RIB CAGE REGION SOMATIC DYSFUNCTION: ICD-10-CM

## 2023-08-01 DIAGNOSIS — M99.05 PELVIC SOMATIC DYSFUNCTION: ICD-10-CM

## 2023-08-01 DIAGNOSIS — M99.03 LUMBAR REGION SOMATIC DYSFUNCTION: ICD-10-CM

## 2023-08-01 DIAGNOSIS — M99.07 UPPER EXTREMITY SOMATIC DYSFUNCTION: ICD-10-CM

## 2023-08-01 SDOH — HEALTH STABILITY: PHYSICAL HEALTH: ON AVERAGE, HOW MANY DAYS PER WEEK DO YOU ENGAGE IN MODERATE TO STRENUOUS EXERCISE (LIKE A BRISK WALK)?: 3 DAYS

## 2023-08-01 SDOH — HEALTH STABILITY: PHYSICAL HEALTH: ON AVERAGE, HOW MANY MINUTES DO YOU ENGAGE IN EXERCISE AT THIS LEVEL?: 60 MIN

## 2023-08-01 ASSESSMENT — SOCIAL DETERMINANTS OF HEALTH (SDOH)
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
## 2023-08-22 ENCOUNTER — OFFICE VISIT (OUTPATIENT)
Age: 58
End: 2023-08-22

## 2023-08-22 VITALS — HEIGHT: 65 IN | RESPIRATION RATE: 14 BRPM | WEIGHT: 199 LBS | BODY MASS INDEX: 33.15 KG/M2
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
## 2023-08-22 DIAGNOSIS — M99.03 LUMBAR REGION SOMATIC DYSFUNCTION: ICD-10-CM

## 2023-08-22 DIAGNOSIS — M99.05 PELVIC SOMATIC DYSFUNCTION: ICD-10-CM

## 2023-08-22 DIAGNOSIS — M99.02 THORACIC REGION SOMATIC DYSFUNCTION: ICD-10-CM

## 2023-08-22 DIAGNOSIS — M99.09 SOMATIC DYSFUNCTION OF ABDOMINAL REGION: ICD-10-CM

## 2023-08-22 DIAGNOSIS — S06.0X0A CONCUSSION WITHOUT LOSS OF CONSCIOUSNESS, INITIAL ENCOUNTER: Primary | ICD-10-CM

## 2023-08-22 DIAGNOSIS — M43.16 SPONDYLOLISTHESIS AT L4-L5 LEVEL: ICD-10-CM

## 2023-08-22 DIAGNOSIS — M99.08 RIB CAGE REGION SOMATIC DYSFUNCTION: ICD-10-CM

## 2023-08-22 DIAGNOSIS — M99.07 UPPER EXTREMITY SOMATIC DYSFUNCTION: ICD-10-CM

## 2023-08-22 DIAGNOSIS — M99.06 LOWER LIMB REGION SOMATIC DYSFUNCTION: ICD-10-CM

## 2023-08-22 DIAGNOSIS — M99.04 SACRAL REGION SOMATIC DYSFUNCTION: ICD-10-CM

## 2023-08-22 DIAGNOSIS — M51.36 DDD (DEGENERATIVE DISC DISEASE), LUMBAR: ICD-10-CM

## 2023-08-22 DIAGNOSIS — M99.01 CERVICAL SOMATIC DYSFUNCTION: ICD-10-CM

## 2023-08-22 DIAGNOSIS — M48.061 FORAMINAL STENOSIS OF LUMBAR REGION: ICD-10-CM
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
## 2023-08-23 ENCOUNTER — TELEPHONE (OUTPATIENT)
Age: 58
End: 2023-08-23

## 2023-08-23 NOTE — TELEPHONE ENCOUNTER
Patient called and asked to speak a nurse regarding her work note. She needed more clarification on when she starts to get the headaches. She also stated that there are no physical limitations. Patient can be reached at [phone].

## 2023-09-05 ENCOUNTER — OFFICE VISIT (OUTPATIENT)
Age: 58
End: 2023-09-05
Payer: COMMERCIAL

## 2023-09-05 VITALS — BODY MASS INDEX: 32.65 KG/M2 | WEIGHT: 196 LBS | RESPIRATION RATE: 14 BRPM | HEIGHT: 65 IN

## 2023-09-05 DIAGNOSIS — M99.09 SOMATIC DYSFUNCTION OF ABDOMINAL REGION: ICD-10-CM

## 2023-09-05 DIAGNOSIS — M99.08 RIB CAGE REGION SOMATIC DYSFUNCTION: ICD-10-CM

## 2023-09-05 DIAGNOSIS — M99.04 SACRAL REGION SOMATIC DYSFUNCTION: ICD-10-CM

## 2023-09-05 DIAGNOSIS — S06.0X0D CONCUSSION WITHOUT LOSS OF CONSCIOUSNESS, SUBSEQUENT ENCOUNTER: Primary | ICD-10-CM
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
## 2023-09-05 DIAGNOSIS — M48.061 FORAMINAL STENOSIS OF LUMBAR REGION: ICD-10-CM

## 2023-09-05 DIAGNOSIS — M99.06 LOWER LIMB REGION SOMATIC DYSFUNCTION: ICD-10-CM

## 2023-09-05 DIAGNOSIS — M51.36 DDD (DEGENERATIVE DISC DISEASE), LUMBAR: ICD-10-CM

## 2023-09-05 DIAGNOSIS — M99.01 CERVICAL SOMATIC DYSFUNCTION: ICD-10-CM

## 2023-09-05 DIAGNOSIS — M99.07 UPPER EXTREMITY SOMATIC DYSFUNCTION: ICD-10-CM

## 2023-09-05 DIAGNOSIS — M43.16 SPONDYLOLISTHESIS AT L4-L5 LEVEL: ICD-10-CM

## 2023-09-05 DIAGNOSIS — M99.05 PELVIC SOMATIC DYSFUNCTION: ICD-10-CM

## 2023-09-05 DIAGNOSIS — M99.03 LUMBAR REGION SOMATIC DYSFUNCTION: ICD-10-CM

## 2023-09-05 DIAGNOSIS — M99.02 THORACIC REGION SOMATIC DYSFUNCTION: ICD-10-CM

## 2023-09-05 PROCEDURE — 99214 OFFICE O/P EST MOD 30 MIN: CPT | Performed by: FAMILY MEDICINE

## 2023-09-05 PROCEDURE — 98929 OSTEOPATH MANJ 9-10 REGIONS: CPT | Performed by: FAMILY MEDICINE

## 2023-09-19 ENCOUNTER — OFFICE VISIT (OUTPATIENT)
Age: 58
End: 2023-09-19

## 2023-09-19 VITALS — BODY MASS INDEX: 32.28 KG/M2 | WEIGHT: 194 LBS
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
## 2023-09-19 DIAGNOSIS — M99.05 PELVIC SOMATIC DYSFUNCTION: ICD-10-CM

## 2023-09-19 DIAGNOSIS — M99.01 CERVICAL SOMATIC DYSFUNCTION: ICD-10-CM

## 2023-09-19 DIAGNOSIS — M99.08 RIB CAGE REGION SOMATIC DYSFUNCTION: ICD-10-CM

## 2023-09-19 DIAGNOSIS — M99.04 SACRAL REGION SOMATIC DYSFUNCTION: ICD-10-CM

## 2023-09-19 DIAGNOSIS — M99.09 SOMATIC DYSFUNCTION OF ABDOMINAL REGION: ICD-10-CM

## 2023-09-19 DIAGNOSIS — M99.02 THORACIC REGION SOMATIC DYSFUNCTION: ICD-10-CM

## 2023-09-19 DIAGNOSIS — M43.16 SPONDYLOLISTHESIS AT L4-L5 LEVEL: ICD-10-CM

## 2023-09-19 DIAGNOSIS — M99.03 LUMBAR REGION SOMATIC DYSFUNCTION: ICD-10-CM

## 2023-09-19 DIAGNOSIS — M51.36 DDD (DEGENERATIVE DISC DISEASE), LUMBAR: ICD-10-CM

## 2023-09-19 DIAGNOSIS — S06.0X0D CONCUSSION WITHOUT LOSS OF CONSCIOUSNESS, SUBSEQUENT ENCOUNTER: Primary | ICD-10-CM

## 2023-09-19 DIAGNOSIS — M99.06 LOWER LIMB REGION SOMATIC DYSFUNCTION: ICD-10-CM

## 2023-09-19 DIAGNOSIS — M99.07 UPPER EXTREMITY SOMATIC DYSFUNCTION: ICD-10-CM

## 2023-09-19 DIAGNOSIS — M48.061 FORAMINAL STENOSIS OF LUMBAR REGION: ICD-10-CM
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
## 2023-10-06 ENCOUNTER — HOSPITAL ENCOUNTER (OUTPATIENT)
Facility: HOSPITAL | Age: 58
Setting detail: SPECIMEN
End: 2023-10-06
Payer: COMMERCIAL

## 2023-10-06 DIAGNOSIS — E78.00 PURE HYPERCHOLESTEROLEMIA, UNSPECIFIED: ICD-10-CM

## 2023-10-06 DIAGNOSIS — E55.9 VITAMIN D DEFICIENCY: ICD-10-CM

## 2023-10-06 DIAGNOSIS — R73.03 PREDIABETES: ICD-10-CM

## 2023-10-06 DIAGNOSIS — I10 ESSENTIAL (PRIMARY) HYPERTENSION: ICD-10-CM

## 2023-10-06 LAB
25(OH)D3 SERPL-MCNC: 38.7 NG/ML (ref 30–100)
ALBUMIN SERPL-MCNC: 4.3 G/DL (ref 3.4–5)
ALBUMIN/GLOB SERPL: 1.3 (ref 0.8–1.7)
ALP SERPL-CCNC: 80 U/L (ref 45–117)
ALT SERPL-CCNC: 55 U/L (ref 13–56)
ANION GAP SERPL CALC-SCNC: 7 MMOL/L (ref 3–18)
AST SERPL-CCNC: 36 U/L (ref 10–38)
BILIRUB SERPL-MCNC: 0.3 MG/DL (ref 0.2–1)
BUN SERPL-MCNC: 19 MG/DL (ref 7–18)
BUN/CREAT SERPL: 15 (ref 12–20)
CALCIUM SERPL-MCNC: 9.5 MG/DL (ref 8.5–10.1)
CHLORIDE SERPL-SCNC: 101 MMOL/L (ref 100–111)
CHOLEST SERPL-MCNC: 162 MG/DL
CO2 SERPL-SCNC: 28 MMOL/L (ref 21–32)
CREAT SERPL-MCNC: 1.23 MG/DL (ref 0.6–1.3)
EST. AVERAGE GLUCOSE BLD GHB EST-MCNC: 117 MG/DL
GLOBULIN SER CALC-MCNC: 3.4 G/DL (ref 2–4)
GLUCOSE SERPL-MCNC: 99 MG/DL (ref 74–99)
HBA1C MFR BLD: 5.7 % (ref 4.2–5.6)
HDLC SERPL-MCNC: 54 MG/DL (ref 40–60)
HDLC SERPL: 3 (ref 0–5)
LDLC SERPL CALC-MCNC: 92 MG/DL (ref 0–100)
LIPID PANEL: NORMAL
POTASSIUM SERPL-SCNC: 3.7 MMOL/L (ref 3.5–5.5)
PROT SERPL-MCNC: 7.7 G/DL (ref 6.4–8.2)
SODIUM SERPL-SCNC: 136 MMOL/L (ref 136–145)
TRIGL SERPL-MCNC: 80 MG/DL
VLDLC SERPL CALC-MCNC: 16 MG/DL

## 2023-10-06 PROCEDURE — 80061 LIPID PANEL: CPT

## 2023-10-06 PROCEDURE — 80053 COMPREHEN METABOLIC PANEL: CPT

## 2023-10-06 PROCEDURE — 82306 VITAMIN D 25 HYDROXY: CPT

## 2023-10-06 PROCEDURE — 83036 HEMOGLOBIN GLYCOSYLATED A1C: CPT

## 2023-10-06 PROCEDURE — 36415 COLL VENOUS BLD VENIPUNCTURE: CPT

## 2023-10-16 ENCOUNTER — OFFICE VISIT (OUTPATIENT)
Age: 58
End: 2023-10-16
Payer: COMMERCIAL

## 2023-10-16 VITALS
WEIGHT: 198 LBS | SYSTOLIC BLOOD PRESSURE: 113 MMHG | RESPIRATION RATE: 18 BRPM | BODY MASS INDEX: 32.99 KG/M2 | DIASTOLIC BLOOD PRESSURE: 69 MMHG | TEMPERATURE: 98.7 F | OXYGEN SATURATION: 99 % | HEIGHT: 65 IN | HEART RATE: 71 BPM

## 2023-10-16 DIAGNOSIS — R73.03 PREDIABETES: ICD-10-CM

## 2023-10-16 DIAGNOSIS — I10 ESSENTIAL (PRIMARY) HYPERTENSION: Primary | ICD-10-CM

## 2023-10-16 DIAGNOSIS — N18.31 STAGE 3A CHRONIC KIDNEY DISEASE (HCC): ICD-10-CM

## 2023-10-16 DIAGNOSIS — E78.00 PURE HYPERCHOLESTEROLEMIA, UNSPECIFIED: ICD-10-CM

## 2023-10-16 PROCEDURE — 99214 OFFICE O/P EST MOD 30 MIN: CPT | Performed by: INTERNAL MEDICINE

## 2023-10-16 PROCEDURE — 3078F DIAST BP <80 MM HG: CPT | Performed by: INTERNAL MEDICINE

## 2023-10-16 PROCEDURE — 3074F SYST BP LT 130 MM HG: CPT | Performed by: INTERNAL MEDICINE

## 2023-10-16 NOTE — PROGRESS NOTES
Junior Elise presents today for   Chief Complaint   Patient presents with    Hypertension    Cholesterol Problem    Blood Sugar Problem     4 month follow up     Shoulder Pain       Left shoulder pain      Foot Pain     Left heel pain             1. \"Have you been to the ER, urgent care clinic since your last visit? Hospitalized since your last visit? \" no    2. \"Have you seen or consulted any other health care providers outside of the [address] since your last visit? \" Yes [address] [facility]      3. For patients aged 43-73: Has the patient had a colonoscopy / FIT/ Cologuard? Yes - no Care Gap present      If the patient is female:    4. For patients aged 43-66: Has the patient had a mammogram within the past 2 years? Yes - no Care Gap present      5. For patients aged 21-65: Has the patient had a pap smear?  Yes - no Care Gap present
prophylactic mastectomy and TRAM flap and no longer does screening mammograms           Low cholesterol diet, weight control and daily exercise discussed. Return in about 3 months (around 1/16/2024) for labs 1 week before. Reviewed plan of care. Patient has provided input and agrees with goals.

## 2023-10-17 ENCOUNTER — OFFICE VISIT (OUTPATIENT)
Age: 58
End: 2023-10-17

## 2023-10-17 VITALS — WEIGHT: 195 LBS | BODY MASS INDEX: 32.45 KG/M2

## 2023-10-17 DIAGNOSIS — M99.01 CERVICAL SOMATIC DYSFUNCTION: ICD-10-CM

## 2023-10-17 DIAGNOSIS — M99.08 RIB CAGE REGION SOMATIC DYSFUNCTION: ICD-10-CM

## 2023-10-17 DIAGNOSIS — S06.0X0D CONCUSSION WITHOUT LOSS OF CONSCIOUSNESS, SUBSEQUENT ENCOUNTER: Primary | ICD-10-CM

## 2023-10-17 DIAGNOSIS — M99.02 THORACIC REGION SOMATIC DYSFUNCTION: ICD-10-CM

## 2023-10-17 DIAGNOSIS — M99.04 SACRAL REGION SOMATIC DYSFUNCTION: ICD-10-CM

## 2023-10-17 DIAGNOSIS — M99.07 UPPER EXTREMITY SOMATIC DYSFUNCTION: ICD-10-CM

## 2023-10-17 DIAGNOSIS — M99.03 LUMBAR REGION SOMATIC DYSFUNCTION: ICD-10-CM

## 2023-10-17 DIAGNOSIS — M43.16 SPONDYLOLISTHESIS AT L4-L5 LEVEL: ICD-10-CM

## 2023-10-17 DIAGNOSIS — M99.06 LOWER LIMB REGION SOMATIC DYSFUNCTION: ICD-10-CM
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
## 2023-10-17 DIAGNOSIS — M48.061 FORAMINAL STENOSIS OF LUMBAR REGION: ICD-10-CM

## 2023-10-17 DIAGNOSIS — M99.05 PELVIC SOMATIC DYSFUNCTION: ICD-10-CM

## 2023-10-17 DIAGNOSIS — M99.09 SOMATIC DYSFUNCTION OF ABDOMINAL REGION: ICD-10-CM

## 2023-10-17 DIAGNOSIS — M51.36 DDD (DEGENERATIVE DISC DISEASE), LUMBAR: ICD-10-CM

## 2023-10-17 NOTE — PROGRESS NOTES
well.  Notes improvement of Sx and pain is now rated 0/10. HEP/stretches daily. Discussed stretching/strengthening/posture. Will continue avoid aggravating activities and increase work limits 48 hours/week with no more than 8 hours day shift and 12 hours night shift. Total time spent on encounter including chart/imaging/lab review and evaluation/documentation/demo home program/coordination of care/form completion but not including time for any procedures/manipulation 34 minutes.

## 2023-10-25 ENCOUNTER — TELEPHONE (OUTPATIENT)
Age: 58
End: 2023-10-25

## 2023-10-25 NOTE — TELEPHONE ENCOUNTER
----- Message from [address] [facility]. Yennifer Gipson sent at 10/24/2023  3:53 PM EDT -----  Regarding: Renal Dietitian   Contact: [phone]  Good afternoon,   Please refer me to a renal dietitian or nutritionist. I have sent a message to ARTHUR Christy however I have not received a response. Is there anyone else available?     Thank you in advance,    Zina Mejia

## 2023-11-10 ENCOUNTER — TELEPHONE (OUTPATIENT)
Age: 58
End: 2023-11-10

## 2023-11-10 NOTE — TELEPHONE ENCOUNTER
Patient called and requested to speak with someone to request an extension on her work hours (60 hours a week).      She didn't want to elaborate and asked could she just speak with a nurse.     She tried sending a message through [employer] but said she couldn't find Dr. Gloria's name.    Patient can be reached at [phone].

## 2023-11-14 ENCOUNTER — OFFICE VISIT (OUTPATIENT)
Age: 58
End: 2023-11-14

## 2023-11-14 VITALS — BODY MASS INDEX: 31.49 KG/M2 | HEIGHT: 65 IN | WEIGHT: 189 LBS | RESPIRATION RATE: 14 BRPM

## 2023-11-14 DIAGNOSIS — M99.04 SACRAL REGION SOMATIC DYSFUNCTION: ICD-10-CM

## 2023-11-14 DIAGNOSIS — M43.16 SPONDYLOLISTHESIS AT L4-L5 LEVEL: ICD-10-CM

## 2023-11-14 DIAGNOSIS — M99.05 PELVIC SOMATIC DYSFUNCTION: ICD-10-CM

## 2023-11-14 DIAGNOSIS — M99.02 THORACIC REGION SOMATIC DYSFUNCTION: ICD-10-CM

## 2023-11-14 DIAGNOSIS — M99.08 RIB CAGE REGION SOMATIC DYSFUNCTION: ICD-10-CM

## 2023-11-14 DIAGNOSIS — M99.07 UPPER EXTREMITY SOMATIC DYSFUNCTION: ICD-10-CM

## 2023-11-14 DIAGNOSIS — S06.0X0D CONCUSSION WITHOUT LOSS OF CONSCIOUSNESS, SUBSEQUENT ENCOUNTER: Primary | ICD-10-CM

## 2023-11-14 DIAGNOSIS — M99.01 CERVICAL SOMATIC DYSFUNCTION: ICD-10-CM

## 2023-11-14 DIAGNOSIS — M99.06 LOWER LIMB REGION SOMATIC DYSFUNCTION: ICD-10-CM

## 2023-11-14 DIAGNOSIS — M51.36 DDD (DEGENERATIVE DISC DISEASE), LUMBAR: ICD-10-CM

## 2023-11-14 DIAGNOSIS — M99.03 LUMBAR REGION SOMATIC DYSFUNCTION: ICD-10-CM

## 2023-11-14 DIAGNOSIS — M99.09 SOMATIC DYSFUNCTION OF ABDOMINAL REGION: ICD-10-CM

## 2023-11-14 DIAGNOSIS — M48.061 FORAMINAL STENOSIS OF LUMBAR REGION: ICD-10-CM
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
## 2023-11-14 NOTE — PROGRESS NOTES
AVS reviewed: yes,   HEP: N/A  Resources Provided: no   Patient questions/concerns answered: yes,   Patient verbalized understanding of treatment plan: yes,
dysfunction  MI OSTEOPATHIC MANIPULATIVE TX 9-10 BODY REGIONS      14. Somatic dysfunction of abdominal region  MI OSTEOPATHIC MANIPULATIVE TX 9-10 BODY REGIONS        Patient (or guardian if minor) verbalizes understanding of evaluation and plan. Verbal consent obtained. Cervical, Thoracic, Rib, Lumbar, Pelvic, Sacral, Upper Ext, Lower Ext, and Abdominal SD treated with Myofascial and ME. Correction of previous malalignments verified after Tx. Pt tolerated well. Notes improvement of Sx and pain is now rated 0/10. HEP/stretches daily. Discussed stretching/strengthening/posture. Will release to full duty as has reached [phone] from [address] 7/15/2023 and follow-up PRN.

## 2024-01-12 LAB
ALB/GLOBRATIO, 58C: 1.7 (CALC) (ref 1–2.5)
ALBUMIN SERPL-MCNC: 4.6 G/DL (ref 3.6–5.1)
ALKALINE PHOSPHATASE, TOTAL, 25002000: 64 U/L (ref 37–153)
ALT SERPL-CCNC: 32 U/L (ref 6–29)
AST SERPL W P-5'-P-CCNC: 27 U/L (ref 10–35)
BASOPHILS # BLD: 39 CELLS/UL (ref 0–200)
BASOPHILS NFR BLD: 1.3 %
BILIRUB SERPL-MCNC: 0.5 MG/DL (ref 0.2–1.2)
BUN SERPL-MCNC: 12 MG/DL (ref 7–25)
BUN/CREATININE RATIO,BUCR: ABNORMAL (CALC) (ref 6–22)
CALCIUM SERPL-MCNC: 10.2 MG/DL (ref 8.6–10.4)
CHLORIDE SERPL-SCNC: 101 MMOL/L (ref 98–110)
CHOL/HDL RATIO,CHHDX: 3 (CALC)
CHOLEST SERPL-MCNC: 188 MG/DL
CO2 SERPL-SCNC: 30 MMOL/L (ref 20–32)
CREAT SERPL-MCNC: 0.93 MG/DL (ref 0.5–1.03)
CREATININE URINE,9612018: 14 MG/DL (ref 20–275)
EGFR: 71 ML/MIN/1.73M2
EOSINOPHIL # BLD: 30 CELLS/UL (ref 15–500)
EOSINOPHIL NFR BLD: 1 %
ERYTHROCYTE [DISTWIDTH] IN BLOOD BY AUTOMATED COUNT: 12.6 % (ref 11–15)
GLOBULIN,GLOB: 2.7 G/DL (CALC) (ref 1.9–3.7)
GLUCOSE SERPL-MCNC: 80 MG/DL (ref 65–99)
HBA1C MFR BLD HPLC: 5.6 % OF TOTAL HGB
HCT VFR BLD AUTO: 39.5 % (ref 35–45)
HDLC SERPL-MCNC: 63 MG/DL
HGB BLD-MCNC: 13.6 G/DL (ref 11.7–15.5)
LDL-CHOLESTEROL: 108 MG/DL (CALC)
LYMPHOCYTES # BLD: 1746 CELLS/UL (ref 850–3900)
LYMPHOCYTES NFR BLD: 58.2 %
MCH RBC QN AUTO: 32.2 PG (ref 27–33)
MCHC RBC AUTO-ENTMCNC: 34.4 G/DL (ref 32–36)
MCV RBC AUTO: 93.4 FL (ref 80–100)
MICROALBUMIN,URINE RANDOM 140054: <0.2 MG/DL
MICROALBUMIN/CREAT RATIO: ABNORMAL MCG/MG CREAT
MONOCYTES # BLD: 297 CELLS/UL (ref 200–950)
MONOCYTES NFR BLD: 9.9 %
NEUTROPHILS # BLD AUTO: 888 CELLS/UL (ref 1500–7800)
NEUTROPHILS # BLD: 29.6 %
NON-HDL CHOLESTEROL, 011976: 125 MG/DL (CALC)
PLATELET # BLD AUTO: 302 THOUSAND/UL (ref 140–400)
PMV BLD AUTO: 11.2 FL (ref 7.5–12.5)
POTASSIUM SERPL-SCNC: 4.1 MMOL/L (ref 3.5–5.3)
PROT SERPL-MCNC: 7.3 G/DL (ref 6.1–8.1)
RBC # BLD AUTO: 4.23 MILLION/UL (ref 3.8–5.1)
SODIUM SERPL-SCNC: 139 MMOL/L (ref 135–146)
TRIGL SERPL-MCNC: 84 MG/DL (ref ?–150)
WBC # BLD AUTO: 3 THOUSAND/UL (ref 3.8–10.8)

## 2024-01-15 ENCOUNTER — TELEPHONE (OUTPATIENT)
Age: 59
End: 2024-01-15

## 2024-01-15 NOTE — TELEPHONE ENCOUNTER
----- Message from Ana Laura Ortiz sent at 1/15/2024  8:36 AM EST -----  Subject: Message to Provider    QUESTIONS  Information for Provider? pt would like to change her appt for 1/16/24 to   a virtual visit   ---------------------------------------------------------------------------  --------------  CALL BACK INFO  [phone]; OK to leave message on voicemail  ---------------------------------------------------------------------------  --------------  SCRIPT ANSWERS  Relationship to Patient? Self

## 2024-01-16 ENCOUNTER — TELEMEDICINE (OUTPATIENT)
Age: 59
End: 2024-01-16
Payer: COMMERCIAL

## 2024-01-16 ENCOUNTER — PATIENT MESSAGE (OUTPATIENT)
Age: 59
End: 2024-01-16

## 2024-01-16 DIAGNOSIS — N18.31 STAGE 3A CHRONIC KIDNEY DISEASE (HCC): ICD-10-CM

## 2024-01-16 DIAGNOSIS — I10 ESSENTIAL (PRIMARY) HYPERTENSION: Primary | ICD-10-CM

## 2024-01-16 DIAGNOSIS — E78.00 PURE HYPERCHOLESTEROLEMIA, UNSPECIFIED: ICD-10-CM

## 2024-01-16 DIAGNOSIS — R73.03 PREDIABETES: ICD-10-CM

## 2024-01-16 DIAGNOSIS — E55.9 VITAMIN D DEFICIENCY: ICD-10-CM

## 2024-01-16 PROCEDURE — 99214 OFFICE O/P EST MOD 30 MIN: CPT | Performed by: INTERNAL MEDICINE

## 2024-01-16 RX ORDER — CYCLOBENZAPRINE HCL 5 MG
5 TABLET ORAL 2 TIMES DAILY PRN
Qty: 60 TABLET | Refills: 5 | Status: SHIPPED | OUTPATIENT
Start: 2024-01-16

## 2024-01-16 NOTE — PROGRESS NOTES
Subjective:       Chief Complaint  The patient presents for follow up of hypertension and high cholesterol. Vit D deficiency, prediabetes         HPI  Linda De Luna is a 58 y.o. female seen for follow up of hyperlipidemia.  Shealso has hypertension. Hypertension  well controlled on Norvasc 5 mg and catapres which is also for hot flashes and Zestoretic 20/12.5 BID, patient has chronic kidney disease stage IIIa which has improved to stage 2 and followed by Renal, she has no urine microalbuminuria, no need for SGLT-2 at this time, patient is seeing a nutritionist to a nephrologist and is on low animal protein and about 80% vegetarian diet, she is exercising on a much more consistent basis with her weight down to 183 pounds, hyperlipidemia borderline controlled on Zocor 20 mg.  Would like to get LDL less than 70 to decrease cardiovascular risk.    She was with the medical weight loss program but did not tolerate Contrave.  She will continue to try to AIM for not eating more than 3x/day and more protein and fiber in her diet.  She has mild prediabeties which she is trying to control with cutting back starches and sweets and losing weight.. last Hba1c is down to 5.6.  She was on Metformin at one time but not currently.  .     Diet and Lifestyle: generally follows a low fat low cholesterol diet, follows  a diabetic diet, exercises regularly    Home BP Monitoring: is not measured at home.    Other symptoms and concerns: vit D level is fairly  well controlled on  vit D 5000 units/week    Patient has gastroesophageal reflux disease that is controlled on Prilosec 40 mg daily.    Patient has sleep apnea and is doing well on CPAP machine.    Ms. De Luna has a history of lymphedema in her abdomen, legs and feet that has been ongoing since she had prophylactic mastectomy in 2011 along with breast reconstruction surgery.  She requires compression garments (pantyhose with open and close toe, high waist capris and knee-high socks).

## 2024-01-18 NOTE — TELEPHONE ENCOUNTER
From: Linda De Luna  To: Dr. Hema Lee  Sent: 1/16/2024 10:12 AM EST  Subject: Recent Labs     Good morning,     Please upload a copy of my most recent labs. They where done at [facility].     Thank you in advance.     Linda De Luna

## 2024-02-20 DIAGNOSIS — I10 ESSENTIAL (PRIMARY) HYPERTENSION: ICD-10-CM

## 2024-02-20 RX ORDER — LISINOPRIL AND HYDROCHLOROTHIAZIDE 20; 12.5 MG/1; MG/1
1 TABLET ORAL 2 TIMES DAILY
Qty: 180 TABLET | Refills: 0 | Status: SHIPPED | OUTPATIENT
Start: 2024-02-20

## 2024-02-20 RX ORDER — AMLODIPINE BESYLATE 5 MG/1
5 TABLET ORAL DAILY
Qty: 90 TABLET | Refills: 0 | Status: SHIPPED | OUTPATIENT
Start: 2024-02-20

## 2024-02-20 RX ORDER — SIMVASTATIN 20 MG
TABLET ORAL
Qty: 90 TABLET | Refills: 0 | Status: SHIPPED | OUTPATIENT
Start: 2024-02-20

## 2024-02-20 RX ORDER — CLONIDINE 0.1 MG/24H
PATCH, EXTENDED RELEASE TRANSDERMAL
Qty: 12 PATCH | Refills: 0 | Status: SHIPPED | OUTPATIENT
Start: 2024-02-20

## 2024-02-20 NOTE — TELEPHONE ENCOUNTER
Last OV: 10/16/2023  Next OV: 5/16/2024  Last refill:   2/23/2023 -- Amlodipine  2/27/2023-- Simvastatin  5/23/2023 -- clonidine  7/20/2023 -- Lisinopril-HCT

## 2024-02-26 RX ORDER — CLONIDINE 0.1 MG/24H
PATCH, EXTENDED RELEASE TRANSDERMAL
Qty: 12 PATCH | Refills: 3 | OUTPATIENT
Start: 2024-02-26

## 2024-02-26 RX ORDER — AMLODIPINE BESYLATE 5 MG/1
5 TABLET ORAL DAILY
Qty: 90 TABLET | Refills: 3 | OUTPATIENT
Start: 2024-02-26

## 2024-03-05 ENCOUNTER — TELEMEDICINE (OUTPATIENT)
Age: 59
End: 2024-03-05
Payer: COMMERCIAL

## 2024-03-05 DIAGNOSIS — H10.32 ACUTE BACTERIAL CONJUNCTIVITIS OF LEFT EYE: Primary | ICD-10-CM

## 2024-03-05 PROCEDURE — 99213 OFFICE O/P EST LOW 20 MIN: CPT | Performed by: INTERNAL MEDICINE

## 2024-03-05 RX ORDER — POLYMYXIN B SULFATE AND TRIMETHOPRIM 1; 10000 MG/ML; [USP'U]/ML
1 SOLUTION OPHTHALMIC EVERY 4 HOURS
Qty: 3 ML | Refills: 0 | Status: SHIPPED | OUTPATIENT
Start: 2024-03-05 | End: 2024-03-15

## 2024-03-05 SDOH — ECONOMIC STABILITY: FOOD INSECURITY: WITHIN THE PAST 12 MONTHS, YOU WORRIED THAT YOUR FOOD WOULD RUN OUT BEFORE YOU GOT MONEY TO BUY MORE.: NEVER TRUE

## 2024-03-05 SDOH — ECONOMIC STABILITY: FOOD INSECURITY: WITHIN THE PAST 12 MONTHS, THE FOOD YOU BOUGHT JUST DIDN'T LAST AND YOU DIDN'T HAVE MONEY TO GET MORE.: NEVER TRUE

## 2024-03-05 SDOH — ECONOMIC STABILITY: INCOME INSECURITY: HOW HARD IS IT FOR YOU TO PAY FOR THE VERY BASICS LIKE FOOD, HOUSING, MEDICAL CARE, AND HEATING?: VERY HARD

## 2024-03-05 ASSESSMENT — ENCOUNTER SYMPTOMS
BLURRED VISION: 0
EYE ITCHING: 1
EYE REDNESS: 0
EYE DISCHARGE: 1
PHOTOPHOBIA: 0

## 2024-03-05 ASSESSMENT — PATIENT HEALTH QUESTIONNAIRE - PHQ9
SUM OF ALL RESPONSES TO PHQ9 QUESTIONS 1 & 2: 0
SUM OF ALL RESPONSES TO PHQ QUESTIONS 1-9: 0
SUM OF ALL RESPONSES TO PHQ QUESTIONS 1-9: 0
1. LITTLE INTEREST OR PLEASURE IN DOING THINGS: 0
SUM OF ALL RESPONSES TO PHQ QUESTIONS 1-9: 0
SUM OF ALL RESPONSES TO PHQ QUESTIONS 1-9: 0
2. FEELING DOWN, DEPRESSED OR HOPELESS: 0

## 2024-03-05 NOTE — PROGRESS NOTES
Linda De Luna presents today for   Chief Complaint   Patient presents with    Eye Drainage           \"Have you been to the ER, urgent care clinic since your last visit?  Hospitalized since your last visit?\"    NO    “Have you seen or consulted any other health care providers outside of [facility] since your last visit?”    NO             
(PRILOSEC) 40 MG delayed release capsule TAKE 1 CAPSULE BY MOUTH EVERY DAY    fluticasone (FLONASE) 50 MCG/ACT nasal spray 2 sprays by Nasal route daily    LACTULOSE PO Take by mouth    Cholecalciferol 50 MCG (2000 UT) CAPS Take 1 capsule by mouth daily    Estradiol (VAGIFEM) 10 MCG TABS vaginal tablet INSERT 1 APPLICATORFUL VAGINALLY AT BEDTIME FOR 14 DAYS THEN TWICE PER WEEK    levalbuterol (XOPENEX HFA) 45 MCG/ACT inhaler INHALE 1 TO 2 PUFFS BY MOUTH EVERY 4 TO 6 HOURS AS NEEDED    valACYclovir (VALTREX) 500 MG tablet Take 1 tablet by mouth daily     No current facility-administered medications for this visit.                 --ANNIE ORTEGA MD

## 2024-05-06 RX ORDER — AMLODIPINE BESYLATE 5 MG/1
5 TABLET ORAL DAILY
Qty: 90 TABLET | Refills: 3 | Status: SHIPPED | OUTPATIENT
Start: 2024-05-06

## 2024-05-06 RX ORDER — SIMVASTATIN 20 MG
TABLET ORAL
Qty: 90 TABLET | Refills: 3 | Status: SHIPPED | OUTPATIENT
Start: 2024-05-06

## 2024-05-19 DIAGNOSIS — I10 ESSENTIAL (PRIMARY) HYPERTENSION: ICD-10-CM

## 2024-05-27 RX ORDER — LISINOPRIL AND HYDROCHLOROTHIAZIDE 20; 12.5 MG/1; MG/1
1 TABLET ORAL 2 TIMES DAILY
Qty: 180 TABLET | Refills: 2 | Status: SHIPPED | OUTPATIENT
Start: 2024-05-27

## 2024-05-29 ENCOUNTER — TELEPHONE (OUTPATIENT)
Age: 59
End: 2024-05-29

## 2024-05-29 ENCOUNTER — OFFICE VISIT (OUTPATIENT)
Age: 59
End: 2024-05-29
Payer: COMMERCIAL

## 2024-05-29 VITALS
DIASTOLIC BLOOD PRESSURE: 84 MMHG | SYSTOLIC BLOOD PRESSURE: 127 MMHG | OXYGEN SATURATION: 99 % | WEIGHT: 187 LBS | RESPIRATION RATE: 16 BRPM | TEMPERATURE: 98.3 F | HEART RATE: 54 BPM | BODY MASS INDEX: 31.16 KG/M2 | HEIGHT: 65 IN

## 2024-05-29 DIAGNOSIS — E78.00 PURE HYPERCHOLESTEROLEMIA, UNSPECIFIED: ICD-10-CM

## 2024-05-29 DIAGNOSIS — I10 ESSENTIAL (PRIMARY) HYPERTENSION: Primary | ICD-10-CM

## 2024-05-29 DIAGNOSIS — R73.03 PREDIABETES: ICD-10-CM

## 2024-05-29 DIAGNOSIS — E55.9 VITAMIN D DEFICIENCY: ICD-10-CM

## 2024-05-29 DIAGNOSIS — I89.0 ACQUIRED LYMPHEDEMA: ICD-10-CM

## 2024-05-29 DIAGNOSIS — M47.812 OSTEOARTHRITIS OF CERVICAL SPINE, UNSPECIFIED SPINAL OSTEOARTHRITIS COMPLICATION STATUS: ICD-10-CM

## 2024-05-29 PROCEDURE — 99214 OFFICE O/P EST MOD 30 MIN: CPT | Performed by: INTERNAL MEDICINE

## 2024-05-29 PROCEDURE — 3079F DIAST BP 80-89 MM HG: CPT | Performed by: INTERNAL MEDICINE

## 2024-05-29 PROCEDURE — 3074F SYST BP LT 130 MM HG: CPT | Performed by: INTERNAL MEDICINE

## 2024-05-29 RX ORDER — CLONIDINE 0.1 MG/24H
PATCH, EXTENDED RELEASE TRANSDERMAL
Qty: 12 PATCH | Refills: 2 | Status: SHIPPED | OUTPATIENT
Start: 2024-05-29

## 2024-05-29 NOTE — PROGRESS NOTES
Linda De Luna presents today for   Chief Complaint   Patient presents with    Cholesterol Problem    Hypertension    Blood Sugar Problem     4 month follow up      Patient uploaded BP reading via my chart this morning.       \"Have you been to the ER, urgent care clinic since your last visit?  Hospitalized since your last visit?\"    NO    “Have you seen or consulted any other health care providers outside of [facility] since your last visit?”    Yes, GYN Dr. Whitt.

## 2024-05-29 NOTE — PROGRESS NOTES
Subjective:       Chief Complaint  The patient presents for follow up of hypertension and high cholesterol. Vit D deficiency, prediabetes         HPI  Linda De Luna is a 58 y.o. female seen for follow up of hyperlipidemia.  Shealso has hypertension. Hypertension  well controlled on Norvasc 5 mg and catapres which is also for hot flashes and Zestoretic 20/12.5 BID, patient had chronic kidney disease stage IIIa which is stable and followed by Renal, she has had significant improvement in her GFR with decreasing processed foods and being plant-based over the last 8 months.  No reason for SGLT2 at this time.  Hyperlipidemia borderline controlled on Zocor 20 mg.  Would like to get LDL less than 70 to decrease cardiovascular risk.  She continues to struggle to lose weight by cutting back starches and snacks.  She was with the medical weight loss program but did not tolerate Contrave.  She will continue to try to AIM for not eating more than 3x/day and more protein and fiber in her diet.  She will also try and increase resistance training and aerobic exercise to at least 4 days a week.  She has mild prediabeties which she is trying to control with cutting back starches and sweets and losing weight..  She was on Metformin at one time but not currently.  .     Diet and Lifestyle: generally follows a low fat low cholesterol diet, does not rigorously follow a diabetic diet, exercises regularly    Home BP Monitoring: is not measured at home.    Other symptoms and concerns: vit D level is fairly  well controlled on  vit D 5000 units/week    Patient has gastroesophageal reflux disease that is controlled on Prilosec 40 mg daily.    Patient has sleep apnea and is doing well on CPAP machine.    Ms. De Luna has a history of lymphedema in her abdomen, legs and feet that has been ongoing since she had prophylactic mastectomy in 2011 along with breast reconstruction surgery.  She requires compression garments (pantyhose with open and

## 2024-05-29 NOTE — TELEPHONE ENCOUNTER
----- Message from Linda De Luna sent at 5/29/2024  7:44 AM EDT -----  Regarding: B/P Monitoring   Contact: [phone]  Please see attached B/P results.     Thank you,     Linda De Luna

## 2024-07-17 DIAGNOSIS — H10.32 ACUTE BACTERIAL CONJUNCTIVITIS OF LEFT EYE: ICD-10-CM

## 2024-07-17 RX ORDER — POLYMYXIN B SULFATE AND TRIMETHOPRIM 1; 10000 MG/ML; [USP'U]/ML
1 SOLUTION OPHTHALMIC EVERY 4 HOURS
Qty: 10 ML | Refills: 0 | Status: SHIPPED | OUTPATIENT
Start: 2024-07-17 | End: 2024-08-19

## 2024-09-16 ENCOUNTER — OFFICE VISIT (OUTPATIENT)
Facility: CLINIC | Age: 59
End: 2024-09-16
Payer: COMMERCIAL

## 2024-09-16 VITALS
BODY MASS INDEX: 32.49 KG/M2 | SYSTOLIC BLOOD PRESSURE: 116 MMHG | TEMPERATURE: 98.7 F | DIASTOLIC BLOOD PRESSURE: 76 MMHG | HEART RATE: 65 BPM | WEIGHT: 195 LBS | OXYGEN SATURATION: 95 % | RESPIRATION RATE: 20 BRPM | HEIGHT: 65 IN

## 2024-09-16 DIAGNOSIS — S52.022A CLOSED FRACTURE OF OLECRANON PROCESS OF LEFT ULNA, INITIAL ENCOUNTER: ICD-10-CM

## 2024-09-16 DIAGNOSIS — S06.0X0A CONCUSSION WITHOUT LOSS OF CONSCIOUSNESS, INITIAL ENCOUNTER: Primary | ICD-10-CM

## 2024-09-16 PROCEDURE — 3074F SYST BP LT 130 MM HG: CPT | Performed by: INTERNAL MEDICINE

## 2024-09-16 PROCEDURE — 99213 OFFICE O/P EST LOW 20 MIN: CPT | Performed by: INTERNAL MEDICINE

## 2024-09-16 PROCEDURE — 3078F DIAST BP <80 MM HG: CPT | Performed by: INTERNAL MEDICINE

## 2024-09-16 RX ORDER — OXYCODONE HYDROCHLORIDE 5 MG/1
5 TABLET ORAL
COMMUNITY
Start: 2024-09-16 | End: 2024-09-18

## 2024-09-16 RX ORDER — IBUPROFEN 600 MG/1
600 TABLET, FILM COATED ORAL
COMMUNITY
Start: 2024-09-16 | End: 2024-09-18

## 2024-09-18 ENCOUNTER — HOSPITAL ENCOUNTER (OUTPATIENT)
Facility: HOSPITAL | Age: 59
Discharge: HOME | End: 2024-09-21

## 2024-09-18 ENCOUNTER — OFFICE VISIT (OUTPATIENT)
Age: 59
End: 2024-09-18
Payer: COMMERCIAL

## 2024-09-18 VITALS — WEIGHT: 195 LBS | BODY MASS INDEX: 32.45 KG/M2

## 2024-09-18 DIAGNOSIS — S52.125A CLOSED NONDISPLACED FRACTURE OF HEAD OF LEFT RADIUS, INITIAL ENCOUNTER: ICD-10-CM

## 2024-09-18 DIAGNOSIS — M99.07 UPPER EXTREMITY SOMATIC DYSFUNCTION: ICD-10-CM

## 2024-09-18 DIAGNOSIS — M99.02 THORACIC REGION SOMATIC DYSFUNCTION: ICD-10-CM

## 2024-09-18 DIAGNOSIS — S52.125A CLOSED NONDISPLACED FRACTURE OF HEAD OF LEFT RADIUS, INITIAL ENCOUNTER: Primary | ICD-10-CM

## 2024-09-18 DIAGNOSIS — M99.01 CERVICAL SOMATIC DYSFUNCTION: ICD-10-CM

## 2024-09-18 DIAGNOSIS — S06.0X0A CONCUSSION WITHOUT LOSS OF CONSCIOUSNESS, INITIAL ENCOUNTER: ICD-10-CM

## 2024-09-18 DIAGNOSIS — M99.04 SACRAL REGION SOMATIC DYSFUNCTION: ICD-10-CM

## 2024-09-18 DIAGNOSIS — M99.06 LOWER LIMB REGION SOMATIC DYSFUNCTION: ICD-10-CM

## 2024-09-18 DIAGNOSIS — M99.05 PELVIC REGION SOMATIC DYSFUNCTION: ICD-10-CM

## 2024-09-18 DIAGNOSIS — M99.09 SOMATIC DYSFUNCTION OF ABDOMINAL REGION: ICD-10-CM

## 2024-09-18 DIAGNOSIS — M99.03 SOMATIC DYSFUNCTION OF LUMBAR REGION: ICD-10-CM

## 2024-09-18 DIAGNOSIS — M99.08 RIB CAGE REGION SOMATIC DYSFUNCTION: ICD-10-CM

## 2024-09-18 PROCEDURE — 99215 OFFICE O/P EST HI 40 MIN: CPT | Performed by: FAMILY MEDICINE

## 2024-09-18 PROCEDURE — 98929 OSTEOPATH MANJ 9-10 REGIONS: CPT | Performed by: FAMILY MEDICINE

## 2024-09-27 ENCOUNTER — PATIENT MESSAGE (OUTPATIENT)
Age: 59
End: 2024-09-27

## 2024-10-15 DIAGNOSIS — H10.32 ACUTE BACTERIAL CONJUNCTIVITIS OF LEFT EYE: ICD-10-CM

## 2024-10-15 RX ORDER — POLYMYXIN B SULFATE AND TRIMETHOPRIM 1; 10000 MG/ML; [USP'U]/ML
1 SOLUTION OPHTHALMIC EVERY 4 HOURS
Qty: 10 ML | Refills: 0 | Status: SHIPPED | OUTPATIENT
Start: 2024-10-15 | End: 2024-11-17

## 2024-10-15 RX ORDER — SIMVASTATIN 20 MG
TABLET ORAL
Qty: 90 TABLET | Refills: 3 | Status: SHIPPED | OUTPATIENT
Start: 2024-10-15

## 2024-10-21 ENCOUNTER — OFFICE VISIT (OUTPATIENT)
Age: 59
End: 2024-10-21
Payer: COMMERCIAL

## 2024-10-21 VITALS — WEIGHT: 192 LBS | BODY MASS INDEX: 31.95 KG/M2

## 2024-10-21 DIAGNOSIS — M99.08 RIB CAGE REGION SOMATIC DYSFUNCTION: ICD-10-CM

## 2024-10-21 DIAGNOSIS — M99.02 THORACIC REGION SOMATIC DYSFUNCTION: ICD-10-CM

## 2024-10-21 DIAGNOSIS — M99.06 LOWER LIMB REGION SOMATIC DYSFUNCTION: ICD-10-CM

## 2024-10-21 DIAGNOSIS — S52.125D CLOSED NONDISPLACED FRACTURE OF HEAD OF LEFT RADIUS WITH ROUTINE HEALING, SUBSEQUENT ENCOUNTER: Primary | ICD-10-CM

## 2024-10-21 DIAGNOSIS — M99.07 UPPER EXTREMITY SOMATIC DYSFUNCTION: ICD-10-CM

## 2024-10-21 DIAGNOSIS — M99.09 SOMATIC DYSFUNCTION OF ABDOMINAL REGION: ICD-10-CM

## 2024-10-21 DIAGNOSIS — M99.05 PELVIC REGION SOMATIC DYSFUNCTION: ICD-10-CM

## 2024-10-21 DIAGNOSIS — M99.01 CERVICAL SOMATIC DYSFUNCTION: ICD-10-CM

## 2024-10-21 DIAGNOSIS — M99.04 SACRAL REGION SOMATIC DYSFUNCTION: ICD-10-CM

## 2024-10-21 DIAGNOSIS — M99.03 SOMATIC DYSFUNCTION OF LUMBAR REGION: ICD-10-CM

## 2024-10-21 PROCEDURE — 99214 OFFICE O/P EST MOD 30 MIN: CPT | Performed by: FAMILY MEDICINE

## 2024-10-21 PROCEDURE — 98929 OSTEOPATH MANJ 9-10 REGIONS: CPT | Performed by: FAMILY MEDICINE

## 2024-10-21 NOTE — PROGRESS NOTES
questionnaire     Fear of Current or Ex-Partner: No     Emotionally Abused: No     Physically Abused: No     Sexually Abused: No   Housing Stability: Unknown (3/5/2024)    Housing Stability Vital Sign     Unstable Housing in the Last Year: No     Current Outpatient Medications   Medication Sig Dispense Refill    trimethoprim-polymyxin b (POLYTRIM) 49205-3.1 UNIT/ML-% ophthalmic solution PLACE 1 DROP INTO THE LEFT EYE EVERY 4 HOURS FOR 10 DAYS 10 mL 0    simvastatin (ZOCOR) 20 MG tablet TAKE 1 TABLET BY MOUTH EVERYDAY AT BEDTIME 90 tablet 3    cloNIDine (CATAPRES) 0.1 MG/24HR PTWK APPLY 1 PATCH TO SKIN AND CHANGE EVERY 7 DAYS 12 patch 2    lisinopril-hydroCHLOROthiazide (PRINZIDE;ZESTORETIC) 20-12.5 MG per tablet TAKE 1 TABLET BY MOUTH TWICE A  tablet 2    amLODIPine (NORVASC) 5 MG tablet TAKE 1 TABLET BY MOUTH EVERY DAY 90 tablet 3    cyclobenzaprine (FLEXERIL) 5 MG tablet Take 1 tablet by mouth 2 times daily as needed for Muscle spasms 60 tablet 5    omeprazole (PRILOSEC) 40 MG delayed release capsule TAKE 1 CAPSULE BY MOUTH EVERY DAY 90 capsule 2    fluticasone (FLONASE) 50 MCG/ACT nasal spray 2 sprays by Nasal route daily 16 g 3    LACTULOSE PO Take by mouth      Cholecalciferol 50 MCG (2000 UT) CAPS Take 1 capsule by mouth daily      Estradiol (VAGIFEM) 10 MCG TABS vaginal tablet INSERT 1 APPLICATORFUL VAGINALLY AT BEDTIME FOR 14 DAYS THEN TWICE PER WEEK      levalbuterol (XOPENEX HFA) 45 MCG/ACT inhaler INHALE 1 TO 2 PUFFS BY MOUTH EVERY 4 TO 6 HOURS AS NEEDED      valACYclovir (VALTREX) 500 MG tablet Take 1 tablet by mouth daily       No current facility-administered medications for this visit.     Past Medical History:   Diagnosis Date    Adverse effect of anesthesia     Hypothermia    Cardiomegaly     pt unaware    Diverticulosis     GERD (gastroesophageal reflux disease)     H/O tubal ligation     H/O: hysterectomy     Hiatal hernia 2007    History of appendectomy     Hypercholesterolemia

## 2024-12-02 ENCOUNTER — OFFICE VISIT (OUTPATIENT)
Facility: CLINIC | Age: 59
End: 2024-12-02
Payer: COMMERCIAL

## 2024-12-02 VITALS
HEART RATE: 57 BPM | TEMPERATURE: 97.6 F | SYSTOLIC BLOOD PRESSURE: 126 MMHG | DIASTOLIC BLOOD PRESSURE: 78 MMHG | OXYGEN SATURATION: 99 % | BODY MASS INDEX: 32.32 KG/M2 | RESPIRATION RATE: 18 BRPM | HEIGHT: 65 IN | WEIGHT: 194 LBS

## 2024-12-02 DIAGNOSIS — R73.03 PREDIABETES: ICD-10-CM

## 2024-12-02 DIAGNOSIS — I10 ESSENTIAL (PRIMARY) HYPERTENSION: Primary | ICD-10-CM

## 2024-12-02 DIAGNOSIS — E78.00 PURE HYPERCHOLESTEROLEMIA, UNSPECIFIED: ICD-10-CM

## 2024-12-02 DIAGNOSIS — N18.2 STAGE 2 CHRONIC KIDNEY DISEASE: ICD-10-CM

## 2024-12-02 DIAGNOSIS — E55.9 VITAMIN D DEFICIENCY: ICD-10-CM

## 2024-12-02 PROCEDURE — 3074F SYST BP LT 130 MM HG: CPT | Performed by: INTERNAL MEDICINE

## 2024-12-02 PROCEDURE — 3078F DIAST BP <80 MM HG: CPT | Performed by: INTERNAL MEDICINE

## 2024-12-02 PROCEDURE — 99214 OFFICE O/P EST MOD 30 MIN: CPT | Performed by: INTERNAL MEDICINE

## 2024-12-02 RX ORDER — AMLODIPINE BESYLATE 5 MG/1
5 TABLET ORAL DAILY
Qty: 90 TABLET | Refills: 3 | Status: SHIPPED | OUTPATIENT
Start: 2024-12-02

## 2024-12-02 RX ORDER — OMEPRAZOLE 40 MG/1
40 CAPSULE, DELAYED RELEASE ORAL DAILY
Qty: 90 CAPSULE | Refills: 2 | Status: SHIPPED | OUTPATIENT
Start: 2024-12-02

## 2024-12-02 RX ORDER — LISINOPRIL AND HYDROCHLOROTHIAZIDE 12.5; 2 MG/1; MG/1
1 TABLET ORAL 2 TIMES DAILY
Qty: 180 TABLET | Refills: 2 | Status: SHIPPED | OUTPATIENT
Start: 2024-12-02

## 2024-12-02 RX ORDER — CLONIDINE 0.1 MG/24H
PATCH, EXTENDED RELEASE TRANSDERMAL
Qty: 12 PATCH | Refills: 2 | Status: SHIPPED | OUTPATIENT
Start: 2024-12-02

## 2024-12-02 RX ORDER — SIMVASTATIN 20 MG
TABLET ORAL
Qty: 90 TABLET | Refills: 3 | Status: SHIPPED | OUTPATIENT
Start: 2024-12-02

## 2024-12-02 NOTE — PROGRESS NOTES
Subjective:       Chief Complaint  The patient presents for follow up of hypertension and high cholesterol. Vit D deficiency, prediabetes         HPI  Linda De Luna is a 58 y.o. female seen for follow up of hyperlipidemia.  Shealso has hypertension. Hypertension  well controlled on Norvasc 5 mg and catapres which is also for hot flashes and Zestoretic 20/12.5 BID, patient had chronic kidney disease stage 2 which is stable and followed by Renal, she has had significant improvement in her GFR with decreasing processed foods and being plant-based.  No reason for SGLT2 at this time.  Hyperlipidemia borderline controlled on Zocor 20 mg.  Would like to get LDL less than 70 to decrease cardiovascular risk.  She continues to struggle to lose weight. She will consider doing a heart scan.  She had one with Mary in 2016 with a coronary artery calcium score of 0.  .  She was with the medical weight loss program but did not tolerate Contrave.  She will continue to try to AIM for not eating more than 3x/day and more protein and fiber in her diet.  She will also try and increase resistance training and aerobic exercise to at least 4 days a week.  Patient was given information on another medical weight loss program to consider GLP-1.  She has mild prediabeties which she is trying to control with cutting back starches and sweets and losing weight..  She was on Metformin at one time but not currently.  .     Diet and Lifestyle: generally follows a low fat low cholesterol diet, does not rigorously follow a diabetic diet, exercises regularly    Home BP Monitoring: is not measured at home.    Other symptoms and concerns: vit D level on the low side.  Patient to start taking vitamin D 2000 units/day    Patient has gastroesophageal reflux disease that she was trying to control with Gaviscon and Tums but did elevated her calcium levels.  She will go back to taking omeprazole 40 mg as needed.    Patient has sleep apnea and is doing

## 2024-12-02 NOTE — PROGRESS NOTES
Linda De Luna presents today for   Chief Complaint   Patient presents with    Hypertension    Cholesterol Problem    Blood Sugar Problem     6 month follow up            \"Have you been to the ER, urgent care clinic since your last visit?  Hospitalized since your last visit?\"    NO    “Have you seen or consulted any other health care providers outside of [facility] since your last visit?”    NO

## 2025-02-19 RX ORDER — CYCLOBENZAPRINE HCL 5 MG
5 TABLET ORAL 2 TIMES DAILY PRN
Qty: 60 TABLET | Refills: 5 | Status: SHIPPED | OUTPATIENT
Start: 2025-02-19

## 2025-03-05 ENCOUNTER — OFFICE VISIT (OUTPATIENT)
Facility: CLINIC | Age: 60
End: 2025-03-05
Payer: OTHER GOVERNMENT

## 2025-03-05 VITALS
SYSTOLIC BLOOD PRESSURE: 122 MMHG | WEIGHT: 195 LBS | HEART RATE: 72 BPM | TEMPERATURE: 98.2 F | HEIGHT: 65 IN | DIASTOLIC BLOOD PRESSURE: 82 MMHG | RESPIRATION RATE: 18 BRPM | OXYGEN SATURATION: 96 % | BODY MASS INDEX: 32.49 KG/M2

## 2025-03-05 DIAGNOSIS — E55.9 VITAMIN D DEFICIENCY: ICD-10-CM

## 2025-03-05 DIAGNOSIS — E78.00 PURE HYPERCHOLESTEROLEMIA, UNSPECIFIED: ICD-10-CM

## 2025-03-05 DIAGNOSIS — N18.2 STAGE 2 CHRONIC KIDNEY DISEASE: ICD-10-CM

## 2025-03-05 DIAGNOSIS — Z78.9 HISTORY OF MEASLES, MUMPS, RUBELLA (MMR) VACCINATION UNKNOWN: ICD-10-CM

## 2025-03-05 DIAGNOSIS — R73.03 PREDIABETES: ICD-10-CM

## 2025-03-05 DIAGNOSIS — I10 ESSENTIAL (PRIMARY) HYPERTENSION: Primary | ICD-10-CM

## 2025-03-05 PROCEDURE — 3074F SYST BP LT 130 MM HG: CPT | Performed by: INTERNAL MEDICINE

## 2025-03-05 PROCEDURE — 3079F DIAST BP 80-89 MM HG: CPT | Performed by: INTERNAL MEDICINE

## 2025-03-05 PROCEDURE — 90677 PCV20 VACCINE IM: CPT | Performed by: INTERNAL MEDICINE

## 2025-03-05 PROCEDURE — 99214 OFFICE O/P EST MOD 30 MIN: CPT | Performed by: INTERNAL MEDICINE

## 2025-03-05 PROCEDURE — 90471 IMMUNIZATION ADMIN: CPT | Performed by: INTERNAL MEDICINE

## 2025-03-05 SDOH — ECONOMIC STABILITY: FOOD INSECURITY: WITHIN THE PAST 12 MONTHS, YOU WORRIED THAT YOUR FOOD WOULD RUN OUT BEFORE YOU GOT MONEY TO BUY MORE.: NEVER TRUE

## 2025-03-05 SDOH — ECONOMIC STABILITY: FOOD INSECURITY: WITHIN THE PAST 12 MONTHS, THE FOOD YOU BOUGHT JUST DIDN'T LAST AND YOU DIDN'T HAVE MONEY TO GET MORE.: NEVER TRUE

## 2025-03-05 ASSESSMENT — PATIENT HEALTH QUESTIONNAIRE - PHQ9
1. LITTLE INTEREST OR PLEASURE IN DOING THINGS: NOT AT ALL
SUM OF ALL RESPONSES TO PHQ QUESTIONS 1-9: 0
2. FEELING DOWN, DEPRESSED OR HOPELESS: NOT AT ALL

## 2025-03-05 NOTE — PROGRESS NOTES
Linda De Luna presents today for   Chief Complaint   Patient presents with    Hypertension    Cholesterol Problem    Blood Sugar Problem     3 month follow up     Immunizations     Pneumonia vaccine            \"Have you been to the ER, urgent care clinic since your last visit?  Hospitalized since your last visit?\"    NO    “Have you seen or consulted any other health care providers outside of [facility] since your last visit?”    NO       Verbal order read back per Dr. Rosa Rashid 20.  Patient received Prevnar 20 vaccine in left deltoid.  Patient was observed and no signs or symptoms of an allergic reaction noted at this time.   Patient tolerated well and left without complaints.  Patient received Prevnar 20 VIS.       
Hemoglobin A1C      4. Vitamin D deficiency  E55.9 Borderline control patient to try and take vitamin D 2000 units/day on a regular basis Vitamin D 25 Hydroxy      5. History of measles, mumps, rubella (MMR) vaccination unknown  Z78.9 Rubella antibody, IgG     Rubeola Antibody, IgG     Mumps Antibody, IgG      6. Stage 2 chronic kidney disease  N18.2 Due to fluctuating renal function will check Cystatin C to get more accurate level for patient's renal function.             HM: pt had bilateral prophylactic mastectomy and TRAM flap and no longer does screening mammograms           Low cholesterol diet, weight control and daily exercise discussed.     No follow-ups on file.       Reviewed plan of care. Patient has provided input and agrees with goals.

## 2025-03-17 RX ORDER — AMLODIPINE BESYLATE 5 MG/1
5 TABLET ORAL DAILY
Qty: 90 TABLET | Refills: 3 | Status: SHIPPED | OUTPATIENT
Start: 2025-03-17

## 2025-03-17 NOTE — TELEPHONE ENCOUNTER
PCP: Hema Lee MD    LAST OFFICE VISIT: 03/05/2025    LAST REFILL PER CHART:  Medication:amLODIPine (NORVASC) 5 MG tablet   Ordered On:12/02/2024  Instructions:Take 1 tablet by mouth daily   Dispense:90 tablets  Refills:3      Future Appointments   Date Time Provider Department Center   6/9/2025  8:40 AM Hema Lee MD [facility] DEP

## 2025-04-10 ENCOUNTER — TELEPHONE (OUTPATIENT)
Facility: CLINIC | Age: 60
End: 2025-04-10

## 2025-04-10 DIAGNOSIS — J45.909 UNCOMPLICATED ASTHMA, UNSPECIFIED ASTHMA SEVERITY, UNSPECIFIED WHETHER PERSISTENT: Primary | ICD-10-CM

## 2025-04-10 RX ORDER — LEVALBUTEROL TARTRATE 45 UG/1
AEROSOL, METERED ORAL
Qty: 1 EACH | Refills: 0 | Status: SHIPPED | OUTPATIENT
Start: 2025-04-10

## 2025-04-10 NOTE — TELEPHONE ENCOUNTER
PCP: Hema Lee MD    Refill Request     LAST OFFICE VISIT: 03/05/25      Medication: levalbuterol (XOPENEX HFA) 45 MCG/ACT inhaler     Dispense: INHALE 1 TO 2 PUFFS BY MOUTH EVERY 4 TO 6 HOURS AS NEEDED       Pharmacy [facility]ACY #[id number] - [facility] [address] - P [phone] - F [phone] [[id number]]       Future Appointments   Date Time Provider Department Center   6/9/2025  8:40 AM [facility]

## 2025-05-01 ENCOUNTER — PATIENT MESSAGE (OUTPATIENT)
Facility: CLINIC | Age: 60
End: 2025-05-01

## 2025-05-02 ENCOUNTER — OFFICE VISIT (OUTPATIENT)
Facility: CLINIC | Age: 60
End: 2025-05-02
Payer: OTHER GOVERNMENT

## 2025-05-02 VITALS
SYSTOLIC BLOOD PRESSURE: 122 MMHG | BODY MASS INDEX: 31.65 KG/M2 | OXYGEN SATURATION: 99 % | WEIGHT: 190 LBS | DIASTOLIC BLOOD PRESSURE: 77 MMHG | TEMPERATURE: 97.8 F | RESPIRATION RATE: 18 BRPM | HEIGHT: 65 IN | HEART RATE: 62 BPM

## 2025-05-02 DIAGNOSIS — D72.819 LEUKOPENIA, UNSPECIFIED TYPE: Primary | ICD-10-CM

## 2025-05-02 DIAGNOSIS — J45.909 UNCOMPLICATED ASTHMA, UNSPECIFIED ASTHMA SEVERITY, UNSPECIFIED WHETHER PERSISTENT: ICD-10-CM

## 2025-05-02 DIAGNOSIS — I10 ESSENTIAL (PRIMARY) HYPERTENSION: ICD-10-CM

## 2025-05-02 PROCEDURE — 99213 OFFICE O/P EST LOW 20 MIN: CPT | Performed by: INTERNAL MEDICINE

## 2025-05-02 PROCEDURE — 3074F SYST BP LT 130 MM HG: CPT | Performed by: INTERNAL MEDICINE

## 2025-05-02 PROCEDURE — 3078F DIAST BP <80 MM HG: CPT | Performed by: INTERNAL MEDICINE

## 2025-05-02 RX ORDER — AMLODIPINE BESYLATE 5 MG/1
5 TABLET ORAL DAILY
Qty: 90 TABLET | Refills: 3 | Status: SHIPPED | OUTPATIENT
Start: 2025-05-02

## 2025-05-02 RX ORDER — SIMVASTATIN 20 MG
TABLET ORAL
Qty: 90 TABLET | Refills: 3 | Status: SHIPPED | OUTPATIENT
Start: 2025-05-02

## 2025-05-02 RX ORDER — CLONIDINE 0.1 MG/24H
PATCH, EXTENDED RELEASE TRANSDERMAL
Qty: 12 PATCH | Refills: 2 | Status: SHIPPED | OUTPATIENT
Start: 2025-05-02

## 2025-05-02 RX ORDER — LISINOPRIL AND HYDROCHLOROTHIAZIDE 12.5; 2 MG/1; MG/1
1 TABLET ORAL 2 TIMES DAILY
Qty: 180 TABLET | Refills: 2 | Status: SHIPPED | OUTPATIENT
Start: 2025-05-02

## 2025-05-02 RX ORDER — LEVALBUTEROL TARTRATE 45 UG/1
2 AEROSOL, METERED ORAL EVERY 6 HOURS PRN
Qty: 1 EACH | Refills: 3 | Status: SHIPPED | OUTPATIENT
Start: 2025-05-02

## 2025-05-02 NOTE — PROGRESS NOTES
Linda De Luna presents today for   Chief Complaint   Patient presents with    Abnormal Lab     Low WBC per labs from Dr. [facility]           \"Have you been to the ER, urgent care clinic since your last visit?  Hospitalized since your last visit?\"    NO    “Have you seen or consulted any other health care providers outside of [facility] since your last visit?”    Yes, Dr. CONY [facility].

## 2025-05-02 NOTE — PROGRESS NOTES
Linda De Luna (:  1965) is a 59 y.o. female established patient, here for evaluation of the following chief complaint(s):  Abnormal Lab (Low WBC per labs from Dr. [facility])      Assessment & Plan   ASSESSMENT/PLAN:    ICD-10-CM    1. Leukopenia, unspecified type  D72.819 Most likely related to recent upper respiratory tract infection for which patient is currently using antibiotics.  Will recheck CBC with Auto Differential in a few weeks once patient is back to her baseline health to see if white blood cell count returns to her normal baseline.      2. Essential (primary) hypertension  I10 lisinopril-hydroCHLOROthiazide (PRINZIDE;ZESTORETIC) 20-12.5 MG per tablet      3. Uncomplicated asthma, unspecified asthma severity, unspecified whether persistent  J45.909 levalbuterol (XOPENEX HFA) 45 MCG/ACT inhaler             Return if symptoms worsen or fail to improve.         Subjective   SUBJECTIVE/OBJECTIVE:  Patient was seen by nephrologist yesterday and her white blood cell count was down to 2.9.  Patient is actually had an upper respiratory tract infection recently and started to take antibiotics after the blood work was done.  White blood cell count is normally about 3-3.3 so is not far from its baseline and with the patient's recent illness would not to pursue any further workup until rechecking her CBC in about 4 weeks when she has her routine blood work.    Respiratory ROS: negative for - shortness of breath  Cardiovascular ROS: negative for - chest pain       Objective   /77 (BP Site: Right Upper Arm, Patient Position: Sitting, BP Cuff Size: Large Adult)   Pulse 62   Temp 97.8 °F (36.6 °C) (Temporal)   Resp 18   Ht 1.651 m (5' 5\")   Wt 86.2 kg (190 lb)   SpO2 99%   BMI 31.62 kg/m²          Current Outpatient Medications   Medication Sig    cloNIDine (CATAPRES) 0.1 MG/24HR PTWK APPLY 1 PATCH TO SKIN AND CHANGE EVERY 7 DAYS    amLODIPine (NORVASC) 5 MG tablet Take 1 tablet by mouth

## 2025-06-10 ENCOUNTER — OFFICE VISIT (OUTPATIENT)
Facility: CLINIC | Age: 60
End: 2025-06-10
Payer: COMMERCIAL

## 2025-06-10 VITALS
BODY MASS INDEX: 31.32 KG/M2 | HEIGHT: 65 IN | OXYGEN SATURATION: 97 % | HEART RATE: 70 BPM | RESPIRATION RATE: 20 BRPM | TEMPERATURE: 98.7 F | SYSTOLIC BLOOD PRESSURE: 127 MMHG | DIASTOLIC BLOOD PRESSURE: 83 MMHG | WEIGHT: 188 LBS

## 2025-06-10 DIAGNOSIS — E55.9 VITAMIN D DEFICIENCY: ICD-10-CM

## 2025-06-10 DIAGNOSIS — E78.00 PURE HYPERCHOLESTEROLEMIA, UNSPECIFIED: ICD-10-CM

## 2025-06-10 DIAGNOSIS — N18.2 STAGE 2 CHRONIC KIDNEY DISEASE: ICD-10-CM

## 2025-06-10 DIAGNOSIS — I10 ESSENTIAL (PRIMARY) HYPERTENSION: Primary | ICD-10-CM

## 2025-06-10 DIAGNOSIS — R73.03 PREDIABETES: ICD-10-CM

## 2025-06-10 PROCEDURE — 99214 OFFICE O/P EST MOD 30 MIN: CPT | Performed by: INTERNAL MEDICINE

## 2025-06-10 PROCEDURE — 3074F SYST BP LT 130 MM HG: CPT | Performed by: INTERNAL MEDICINE

## 2025-06-10 PROCEDURE — 3079F DIAST BP 80-89 MM HG: CPT | Performed by: INTERNAL MEDICINE

## 2025-06-10 NOTE — PROGRESS NOTES
Linda De Luna presents today for   Chief Complaint   Patient presents with    Discuss Labs           \"Have you been to the ER, urgent care clinic since your last visit?  Hospitalized since your last visit?\"    NO    “Have you seen or consulted any other health care providers outside of [facility] since your last visit?”    NO             
R73.03 Borderline control will try and improve with cutting back starches and sugars in her diet and continue to exercise Hemoglobin A1C      4. Vitamin D deficiency  E55.9 Borderline controlled.  Patient to take vitamin D 2000's per day on a consistent basis Vitamin D 25 Hydroxy      5. Stage 2 chronic kidney disease  N18.2 Patient doing fairly well even with Cystatin C GFR has increased to 70 when 2021 formula is used..  Will disregard 2012 formula calculation of 56 on the labs which is no longer used clinically.  CBC with Auto Differential     Albumin/Creatinine Ratio, Urine     Cystatin C             HM: pt had bilateral prophylactic mastectomy and TRAM flap and no longer does screening mammograms           Low cholesterol diet, weight control and daily exercise discussed.     Return in about 3 months (around 9/10/2025) for labs 1 week before.       Reviewed plan of care. Patient has provided input and agrees with goals.

## 2025-06-24 NOTE — TELEPHONE ENCOUNTER
Here for annual physical.  No concerns    Dental: up-to-date  Eye: up-to-date    Colonoscopy: August 2024      Seatbelt: Always    Exercise:  three times a week, walking and weight lifting  Do you eat balanced/healthy meals regularly? Yes    Living Will and/or Healthcare POA: No,   Additional information provided        6/24/2025     9:46 AM 6/11/2024     8:59 AM 6/8/2023     8:27 AM 5/4/2023     7:58 AM 10/8/2019    11:42 AM 6/28/2018     8:50 AM 5/11/2018    12:12 PM   PHQ Scores   PHQ2 Score 0  2    2 0 0 0 0 2   PHQ9 Score 0  2    2 0 0 0 0 2       Patient-reported       History of Present Illness  The patient presents for a routine checkup and f/u chronic conditions.     Chol - taking lipitor daily, no SE    Gout - taking allopurinol daily, No SE. No current gout symptoms, no attacks in several years. No unusual growths on elbows or hands.      Athlete's Foot  - Persistent athlete's foot managed with topical cream and daily spray  - Condition persists, especially affecting left foot  - Keeps area dry, no recent exacerbation of symptoms  - Severe episode 2-3 months ago, treated with clotrimazole and now back to \"baseline\"    Calf Pain  - Pain started last week during a walk at [facility], 2 miles in, [place]  - Pain more pronounced when pushing off  -  took a 4-day break from walking but still sx  - Performing calf stretches and toe raises, pain persists    Loose Stools  - Regular bowel movements, soft and loose, consistent for 3-4 years  - No urgency or blood in stools  -[place] 5          HM reviewed with pt    Patient's medications, allergies, past medical, surgical, social and family histories were reviewed and updated in the EHR as appropriate.    Vitals:    06/25/25 0758 06/25/25 0845   BP: (!) 140/90 132/80   BP Site: Left Upper Arm    Patient Position: Sitting    BP Cuff Size: Large Adult    Pulse: 83    Temp: (!) 96.6 °F (35.9 °C)    TempSrc: Infrared    SpO2: 97%    Weight: 102.6 kg (226 lb 3.2 oz)   She can take her lisinopriHct 2x/day only if she checks her BP and it is very elevated SBP>160

## 2025-07-07 ENCOUNTER — HOSPITAL ENCOUNTER (OUTPATIENT)
Age: 60
Discharge: HOME | End: 2025-07-10
Payer: COMMERCIAL

## 2025-07-07 DIAGNOSIS — R73.03 PREDIABETES: ICD-10-CM

## 2025-07-07 DIAGNOSIS — E78.00 PURE HYPERCHOLESTEROLEMIA, UNSPECIFIED: ICD-10-CM

## 2025-07-07 PROCEDURE — 75571 CT HRT W/O DYE W/CA TEST: CPT

## 2025-07-08 ENCOUNTER — OFFICE VISIT (OUTPATIENT)
Facility: CLINIC | Age: 60
End: 2025-07-08
Payer: COMMERCIAL

## 2025-07-08 ENCOUNTER — PATIENT MESSAGE (OUTPATIENT)
Facility: CLINIC | Age: 60
End: 2025-07-08

## 2025-07-08 VITALS
HEIGHT: 65 IN | SYSTOLIC BLOOD PRESSURE: 134 MMHG | BODY MASS INDEX: 32.32 KG/M2 | DIASTOLIC BLOOD PRESSURE: 80 MMHG | HEART RATE: 58 BPM | OXYGEN SATURATION: 100 % | RESPIRATION RATE: 16 BRPM | WEIGHT: 194 LBS | TEMPERATURE: 98.2 F

## 2025-07-08 DIAGNOSIS — S16.1XXA STRAIN OF NECK MUSCLE, INITIAL ENCOUNTER: ICD-10-CM

## 2025-07-08 DIAGNOSIS — S46.812A STRAIN OF LEFT TRAPEZIUS MUSCLE, INITIAL ENCOUNTER: Primary | ICD-10-CM

## 2025-07-08 PROCEDURE — 3075F SYST BP GE 130 - 139MM HG: CPT | Performed by: INTERNAL MEDICINE

## 2025-07-08 PROCEDURE — 3079F DIAST BP 80-89 MM HG: CPT | Performed by: INTERNAL MEDICINE

## 2025-07-08 PROCEDURE — 99213 OFFICE O/P EST LOW 20 MIN: CPT | Performed by: INTERNAL MEDICINE

## 2025-07-08 NOTE — PROGRESS NOTES
Linda De Luna (:  1965) is a 59 y.o. female established patient, here for evaluation of the following chief complaint(s):  Neck Pain and Shoulder Pain (Left shoulder and neck pain.)      Assessment & Plan   ASSESSMENT/PLAN:    ICD-10-CM    1. Strain of left trapezius muscle, initial encounter  S46.812A It is medically necessary for patient to use massage therapy to decrease inflammation and improve function.  If not improving overall consider physical therapy with dry needling      2. Strain of neck muscle, initial encounter  S16.1XXA We will treat with massage therapy initially and if not improving consider physical therapy with dry needling.             Return if symptoms worsen or fail to improve.         Subjective   SUBJECTIVE/OBJECTIVE:  Patient presents with about a 2-week history of spasm is in the area of her left trapezius and neck.  She has tried a muscle relaxer as well as home massage with minimal improvement.  Patient is interested in trying to do massage therapy first before considering official physical therapy and possible dry needling.    Respiratory ROS: negative for - shortness of breath  Cardiovascular ROS: negative for - chest pain       Objective   /80 (BP Site: Left Upper Arm, Patient Position: Sitting, BP Cuff Size: Large Adult)   Pulse 58   Temp 98.2 °F (36.8 °C) (Temporal)   Resp 16   Ht 1.651 m (5' 5\")   Wt 88 kg (194 lb)   SpO2 100%   BMI 32.28 kg/m²   Neck -decreased range of motion of neck due to pain especially the area of left trapezius as it inserts into the neck.       Current Outpatient Medications   Medication Sig    cloNIDine (CATAPRES) 0.1 MG/24HR PTWK APPLY 1 PATCH TO SKIN AND CHANGE EVERY 7 DAYS    amLODIPine (NORVASC) 5 MG tablet Take 1 tablet by mouth daily    lisinopril-hydroCHLOROthiazide (PRINZIDE;ZESTORETIC) 20-12.5 MG per tablet Take 1 tablet by mouth 2 times daily    simvastatin (ZOCOR) 20 MG tablet TAKE 1 TABLET BY MOUTH EVERYDAY AT BEDTIME

## 2025-07-08 NOTE — PROGRESS NOTES
Linda De Luna presents today for   Chief Complaint   Patient presents with    Neck Pain    Shoulder Pain     Left shoulder and neck pain.           \"Have you been to the ER, urgent care clinic since your last visit?  Hospitalized since your last visit?\"    NO    “Have you seen or consulted any other health care providers outside of [facility] since your last visit?”    NO    “Have you had a colorectal cancer screening such as a colonoscopy/FIT/Cologuard?    NO    Date of last Colonoscopy: 6/25/2020  No cologuard on file  No FIT/FOBT on file   No flexible sigmoidoscopy on file

## 2025-07-09 ENCOUNTER — PATIENT MESSAGE (OUTPATIENT)
Facility: CLINIC | Age: 60
End: 2025-07-09

## 2025-07-09 DIAGNOSIS — J43.9 PULMONARY EMPHYSEMA, UNSPECIFIED EMPHYSEMA TYPE (HCC): Primary | ICD-10-CM

## 2025-07-09 NOTE — TELEPHONE ENCOUNTER
Talked to patient and she has a history of second hand exposure to tobacco when she was a child.  She has minimal wheezing when she has a upper respiratory tract infection but no history of asthma.  Will go ahead and do high-resolution CT scan of chest to evaluate emphysematous changes that was shown on her heart scan.

## 2025-07-15 ENCOUNTER — HOSPITAL ENCOUNTER (OUTPATIENT)
Age: 60
Discharge: HOME | End: 2025-07-18
Payer: COMMERCIAL

## 2025-07-15 DIAGNOSIS — J43.9 PULMONARY EMPHYSEMA, UNSPECIFIED EMPHYSEMA TYPE (HCC): ICD-10-CM

## 2025-07-15 PROCEDURE — 71250 CT THORAX DX C-: CPT

## 2025-08-22 ENCOUNTER — ANESTHESIA EVENT (OUTPATIENT)
Facility: HOSPITAL | Age: 60
End: 2025-08-22
Payer: COMMERCIAL

## 2025-08-25 ENCOUNTER — HOSPITAL ENCOUNTER (OUTPATIENT)
Facility: HOSPITAL | Age: 60
Setting detail: OUTPATIENT SURGERY
Discharge: HOME | End: 2025-08-25
Attending: INTERNAL MEDICINE | Admitting: INTERNAL MEDICINE
Payer: COMMERCIAL

## 2025-08-25 ENCOUNTER — ANESTHESIA (OUTPATIENT)
Facility: HOSPITAL | Age: 60
End: 2025-08-25
Payer: COMMERCIAL

## 2025-08-25 VITALS
DIASTOLIC BLOOD PRESSURE: 65 MMHG | SYSTOLIC BLOOD PRESSURE: 103 MMHG | WEIGHT: 198 LBS | BODY MASS INDEX: 32.95 KG/M2 | HEART RATE: 58 BPM | RESPIRATION RATE: 20 BRPM | OXYGEN SATURATION: 98 % | TEMPERATURE: 97.8 F

## 2025-08-25 LAB — COLONOSCOPY, EXTERNAL: NORMAL

## 2025-08-25 PROCEDURE — 7100000000 HC PACU RECOVERY - FIRST 15 MIN: Performed by: INTERNAL MEDICINE

## 2025-08-25 PROCEDURE — 6360000002 HC RX W HCPCS: Performed by: NURSE ANESTHETIST, CERTIFIED REGISTERED

## 2025-08-25 PROCEDURE — 88305 TISSUE EXAM BY PATHOLOGIST: CPT

## 2025-08-25 PROCEDURE — 3700000001 HC ADD 15 MINUTES (ANESTHESIA): Performed by: INTERNAL MEDICINE

## 2025-08-25 PROCEDURE — 7100000010 HC PHASE II RECOVERY - FIRST 15 MIN: Performed by: INTERNAL MEDICINE

## 2025-08-25 PROCEDURE — 3700000000 HC ANESTHESIA ATTENDED CARE: Performed by: INTERNAL MEDICINE

## 2025-08-25 PROCEDURE — 3600007512: Performed by: INTERNAL MEDICINE

## 2025-08-25 PROCEDURE — 2580000003 HC RX 258: Performed by: NURSE ANESTHETIST, CERTIFIED REGISTERED

## 2025-08-25 PROCEDURE — 2709999900 HC NON-CHARGEABLE SUPPLY: Performed by: INTERNAL MEDICINE

## 2025-08-25 PROCEDURE — 3600007502: Performed by: INTERNAL MEDICINE

## 2025-08-25 RX ORDER — SODIUM CHLORIDE, SODIUM LACTATE, POTASSIUM CHLORIDE, CALCIUM CHLORIDE 600; 310; 30; 20 MG/100ML; MG/100ML; MG/100ML; MG/100ML
INJECTION, SOLUTION INTRAVENOUS CONTINUOUS
Status: DISCONTINUED | OUTPATIENT
Start: 2025-08-25 | End: 2025-08-25 | Stop reason: HOSPADM

## 2025-08-25 RX ORDER — FAMOTIDINE 20 MG/1
20 TABLET, FILM COATED ORAL ONCE
Status: DISCONTINUED | OUTPATIENT
Start: 2025-08-25 | End: 2025-08-25 | Stop reason: HOSPADM

## 2025-08-25 RX ORDER — ONDANSETRON 2 MG/ML
INJECTION INTRAMUSCULAR; INTRAVENOUS
Status: DISCONTINUED | OUTPATIENT
Start: 2025-08-25 | End: 2025-08-25 | Stop reason: SDUPTHER

## 2025-08-25 RX ORDER — SODIUM CHLORIDE 0.9 % (FLUSH) 0.9 %
5-40 SYRINGE (ML) INJECTION PRN
Status: DISCONTINUED | OUTPATIENT
Start: 2025-08-25 | End: 2025-08-25 | Stop reason: HOSPADM

## 2025-08-25 RX ORDER — LIDOCAINE HYDROCHLORIDE 10 MG/ML
1 INJECTION, SOLUTION EPIDURAL; INFILTRATION; INTRACAUDAL; PERINEURAL
Status: DISCONTINUED | OUTPATIENT
Start: 2025-08-25 | End: 2025-08-25 | Stop reason: HOSPADM

## 2025-08-25 RX ORDER — PROPOFOL 10 MG/ML
INJECTION, EMULSION INTRAVENOUS
Status: DISCONTINUED | OUTPATIENT
Start: 2025-08-25 | End: 2025-08-25 | Stop reason: SDUPTHER

## 2025-08-25 RX ORDER — LIDOCAINE HYDROCHLORIDE 20 MG/ML
INJECTION, SOLUTION EPIDURAL; INFILTRATION; INTRACAUDAL; PERINEURAL
Status: DISCONTINUED | OUTPATIENT
Start: 2025-08-25 | End: 2025-08-25 | Stop reason: SDUPTHER

## 2025-08-25 RX ADMIN — PROPOFOL 100 MG: 10 INJECTION, EMULSION INTRAVENOUS at 07:28

## 2025-08-25 RX ADMIN — SODIUM CHLORIDE, SODIUM LACTATE, POTASSIUM CHLORIDE, AND CALCIUM CHLORIDE: .6; .31; .03; .02 INJECTION, SOLUTION INTRAVENOUS at 06:58

## 2025-08-25 RX ADMIN — LIDOCAINE HYDROCHLORIDE 50 MG: 20 SOLUTION INTRAVENOUS at 07:28

## 2025-08-25 RX ADMIN — PROPOFOL 25 MG: 10 INJECTION, EMULSION INTRAVENOUS at 07:42

## 2025-08-25 RX ADMIN — PROPOFOL 50 MG: 10 INJECTION, EMULSION INTRAVENOUS at 07:34

## 2025-08-25 RX ADMIN — ONDANSETRON 4 MG: 2 INJECTION INTRAMUSCULAR; INTRAVENOUS at 07:27

## 2025-08-25 RX ADMIN — PROPOFOL 25 MG: 10 INJECTION, EMULSION INTRAVENOUS at 07:30

## 2025-08-25 ASSESSMENT — PAIN - FUNCTIONAL ASSESSMENT
PAIN_FUNCTIONAL_ASSESSMENT: 0-10
PAIN_FUNCTIONAL_ASSESSMENT: 0-10

## 2025-08-25 ASSESSMENT — PAIN DESCRIPTION - DESCRIPTORS: DESCRIPTORS: CRAMPING

## 2025-09-03 ENCOUNTER — HOSPITAL ENCOUNTER (OUTPATIENT)
Facility: HOSPITAL | Age: 60
Discharge: HOME | End: 2025-09-06

## 2025-09-03 ENCOUNTER — OFFICE VISIT (OUTPATIENT)
Age: 60
End: 2025-09-03
Payer: COMMERCIAL

## 2025-09-03 VITALS — BODY MASS INDEX: 31.49 KG/M2 | WEIGHT: 189 LBS | HEIGHT: 65 IN | RESPIRATION RATE: 14 BRPM

## 2025-09-03 DIAGNOSIS — M50.30 DDD (DEGENERATIVE DISC DISEASE), CERVICAL: Primary | ICD-10-CM

## 2025-09-03 DIAGNOSIS — M99.02 THORACIC REGION SOMATIC DYSFUNCTION: ICD-10-CM

## 2025-09-03 DIAGNOSIS — M48.02 SPINAL STENOSIS, CERVICAL REGION: ICD-10-CM

## 2025-09-03 DIAGNOSIS — M99.08 RIB CAGE REGION SOMATIC DYSFUNCTION: ICD-10-CM

## 2025-09-03 DIAGNOSIS — M99.05 PELVIC REGION SOMATIC DYSFUNCTION: ICD-10-CM

## 2025-09-03 DIAGNOSIS — M99.06 LOWER LIMB REGION SOMATIC DYSFUNCTION: ICD-10-CM

## 2025-09-03 DIAGNOSIS — M48.02 FORAMINAL STENOSIS OF CERVICAL REGION: ICD-10-CM

## 2025-09-03 DIAGNOSIS — Z98.1 S/P CERVICAL SPINAL FUSION: ICD-10-CM

## 2025-09-03 DIAGNOSIS — M50.30 DDD (DEGENERATIVE DISC DISEASE), CERVICAL: ICD-10-CM

## 2025-09-03 DIAGNOSIS — M99.04 SACRAL REGION SOMATIC DYSFUNCTION: ICD-10-CM

## 2025-09-03 DIAGNOSIS — M99.01 CERVICAL SOMATIC DYSFUNCTION: ICD-10-CM

## 2025-09-03 DIAGNOSIS — M99.07 UPPER EXTREMITY SOMATIC DYSFUNCTION: ICD-10-CM

## 2025-09-03 DIAGNOSIS — M99.03 SOMATIC DYSFUNCTION OF LUMBAR REGION: ICD-10-CM

## 2025-09-03 DIAGNOSIS — M99.09 SOMATIC DYSFUNCTION OF ABDOMINAL REGION: ICD-10-CM

## 2025-09-03 PROCEDURE — 98929 OSTEOPATH MANJ 9-10 REGIONS: CPT | Performed by: FAMILY MEDICINE

## 2025-09-03 PROCEDURE — 99214 OFFICE O/P EST MOD 30 MIN: CPT | Performed by: FAMILY MEDICINE

## (undated) DEVICE — MEDI-VAC SUCTION HIGH CAPACITY: Brand: CARDINAL HEALTH

## (undated) DEVICE — CANNULA NSL AD TBNG L14FT STD PVC O2 CRV CONN NONFLARED NSL

## (undated) DEVICE — UNDERPAD INCONT W23XL36IN STD BLU POLYPR BK FLUF SFT

## (undated) DEVICE — CATH IV SAFE STR 22GX1IN BLU -- PROTECTIV PLUS

## (undated) DEVICE — FLUFF AND POLYMER UNDERPAD,EXTRA HEAVY: Brand: WINGS

## (undated) DEVICE — CATHETER SUCT TR FL TIP 14FR W/ O CTRL

## (undated) DEVICE — ENDOSCOPY PUMP TUBING/ CAP SET: Brand: ERBE

## (undated) DEVICE — SYR 20ML LL STRL LF --

## (undated) DEVICE — SYRINGE MED 10ML LUERLOCK TIP W/O SFTY DISP

## (undated) DEVICE — LINER SUCT CANSTR 3000CC PLAS SFT PRE ASSEMB W/OUT TBNG W/

## (undated) DEVICE — SOLUTION IRRIG 1000ML H2O STRL BLT

## (undated) DEVICE — GAUZE,SPONGE,4"X4",16PLY,STRL,LF,10/TRAY: Brand: MEDLINE

## (undated) DEVICE — Device

## (undated) DEVICE — MEDI-VAC NON-CONDUCTIVE SUCTION TUBING: Brand: CARDINAL HEALTH

## (undated) DEVICE — SYRINGE MED 25GA 3ML L5/8IN SUBQ PLAS W/ DETACH NDL SFTY

## (undated) DEVICE — SET TBNG CAP ENDOSCP PMP BK FLO VLV DISP ERBEFLO

## (undated) DEVICE — TUBING SUCT L12FT DIA0.187IN CLR W/ MAXI-GRIP AND M/M CONN RSFH ONLY

## (undated) DEVICE — AIRLIFE™ NASAL OXYGEN CANNULA CURVED, NONFLARED TIP WITH 14 FOOT (4.3 M) CRUSH-RESISTANT TUBING, OVER-THE-EAR STYLE: Brand: AIRLIFE™

## (undated) DEVICE — SNARE VASC L240CM LOOP W10MM SHTH DIA2.4MM RND STIFF CLD

## (undated) DEVICE — CANNULA ORIG TL CLR W FOAM CUSHIONS AND 14FT SUPL TB 3 CHN

## (undated) DEVICE — FLEX ADVANTAGE 3000CC: Brand: FLEX ADVANTAGE

## (undated) DEVICE — FLEX ADVANTAGE 1500CC: Brand: FLEX ADVANTAGE

## (undated) DEVICE — SNARE POLYP M W27MMXL240CM OVL STIFF DISP CAPTIVATOR

## (undated) DEVICE — SYRINGE 20ML LL S/C 50

## (undated) DEVICE — GOWN ISOLATN UNIV BLU POLYETH PREM OVR THE HD W/ THMB HK

## (undated) DEVICE — BITE BLOCK ENDOSCP UNIV AD 6 TO 9.4 MM

## (undated) DEVICE — SPONGE GZ 16 PLY WVN COT 4INX4IN STERIL

## (undated) DEVICE — HANDLE YANK HI CAP SMOOTH

## (undated) DEVICE — GOWN ISOL IMPERV UNIV, DISP, OPEN BACK, BLUE --

## (undated) DEVICE — SYR 10ML LUER LOK 1/5ML GRAD --

## (undated) DEVICE — TRAP SPEC COLL POLYP POLYSTYR --

## (undated) DEVICE — TRAP SPEC POLYP REM STRNR CLN DSGN MAGNIFYING WIND DISP

## (undated) DEVICE — SYR 50ML SLIP TIP NSAF LF STRL --

## (undated) DEVICE — FORCEPS BX L240CM JAW DIA2.8MM L CAP W/ NDL MIC MESH TOOTH

## (undated) DEVICE — SYRINGE MED 50ML LUERSLIP TIP